# Patient Record
Sex: FEMALE | Race: WHITE | NOT HISPANIC OR LATINO | Employment: OTHER | URBAN - METROPOLITAN AREA
[De-identification: names, ages, dates, MRNs, and addresses within clinical notes are randomized per-mention and may not be internally consistent; named-entity substitution may affect disease eponyms.]

---

## 2017-10-26 ENCOUNTER — TELEPHONE (OUTPATIENT)
Dept: OTHER | Facility: CLINIC | Age: 82
End: 2017-10-26

## 2017-10-26 NOTE — TELEPHONE ENCOUNTER
Angely called to ask if we could send the ultrasound orders to [facility] in [place], [place] - [address].  She said Dr Abel usually reviews her yearly carotid US results with her by phone.    I called [facility] and faxed the orders for mariana carotid US to [phone].  I called Angely back to give her the phone number of the radiology dept there, option 1 to schedule:  [phone]. I let her know to schedule it after 11/9/17, in case there are medicare guidelines for 1 per year. JAM 10/26/17

## 2017-11-14 ENCOUNTER — TRANSFERRED RECORDS (OUTPATIENT)
Dept: HEALTH INFORMATION MANAGEMENT | Facility: CLINIC | Age: 82
End: 2017-11-14

## 2017-12-18 ENCOUNTER — TELEPHONE (OUTPATIENT)
Dept: OTHER | Facility: CLINIC | Age: 82
End: 2017-12-18

## 2017-12-18 NOTE — TELEPHONE ENCOUNTER
Pt LM on triage line stating that she was calling about her carotid US result.  Pt states that she had the carotid US done in [place], [place].      Unable to find results.  Requested results be faxed from [facility].    Informed pt that we had not received results yet, but that they have been requested.  We will call the pt back once results have been reviewed with .  Pt agreeable.    Claudia Christianson, MELLYN, RN

## 2017-12-19 ENCOUNTER — TELEPHONE (OUTPATIENT)
Dept: OTHER | Facility: CLINIC | Age: 82
End: 2017-12-19

## 2017-12-19 NOTE — TELEPHONE ENCOUNTER
[place] [facility]    Angely Bryan had undergone a left CEA on 8/18/2015 for a symptomatic ulcerated high-grade stenosis with no complications.  She was noted to have minimal disease on the right side at that time.    Patient lives in [facility] which makes follow up with us difficult.  She had a carotid duplex ultrasound performed near her home on 11/14/2017 that was forwarded to me.  The left CEA site remains widely patent.  There is a moderate stenosis of the right carotid bulb and proximal ICA with the latter being less than 40% and thus not clinically significant.    Discussed the findings with her on the phone today.  She is 86-year-old and remains very active.  She has no complaints at all.  Very unlikely that the carotid surgery would develop recurrent stenosis but she may have progression on the right side with time.  Thus she should consider a repeat carotid duplex in approximately 2-3 years pending her health status.  She will arrange this accordingly close to her home.    Arnold Abel MD

## 2023-12-23 ENCOUNTER — APPOINTMENT (OUTPATIENT)
Dept: CT IMAGING | Facility: CLINIC | Age: 88
DRG: 082 | End: 2023-12-23
Attending: EMERGENCY MEDICINE
Payer: MEDICARE

## 2023-12-23 ENCOUNTER — APPOINTMENT (OUTPATIENT)
Dept: MRI IMAGING | Facility: CLINIC | Age: 88
DRG: 082 | End: 2023-12-23
Attending: PHYSICIAN ASSISTANT
Payer: MEDICARE

## 2023-12-23 ENCOUNTER — APPOINTMENT (OUTPATIENT)
Dept: GENERAL RADIOLOGY | Facility: CLINIC | Age: 88
DRG: 082 | End: 2023-12-23
Attending: EMERGENCY MEDICINE
Payer: MEDICARE

## 2023-12-23 ENCOUNTER — HOSPITAL ENCOUNTER (INPATIENT)
Facility: CLINIC | Age: 88
LOS: 5 days | Discharge: NURSING, REHAB OR LONG-TERM CARE | DRG: 082 | End: 2023-12-28
Attending: EMERGENCY MEDICINE | Admitting: HOSPITALIST
Payer: MEDICARE

## 2023-12-23 DIAGNOSIS — G89.29 CHRONIC BACK PAIN, UNSPECIFIED BACK LOCATION, UNSPECIFIED BACK PAIN LATERALITY: ICD-10-CM

## 2023-12-23 DIAGNOSIS — M54.9 CHRONIC BACK PAIN, UNSPECIFIED BACK LOCATION, UNSPECIFIED BACK PAIN LATERALITY: ICD-10-CM

## 2023-12-23 DIAGNOSIS — I61.5 INTRAVENTRICULAR HEMORRHAGE (H): ICD-10-CM

## 2023-12-23 DIAGNOSIS — R41.0 CONFUSION: ICD-10-CM

## 2023-12-23 LAB
ALBUMIN SERPL BCG-MCNC: 4.1 G/DL (ref 3.5–5.2)
ALBUMIN UR-MCNC: 10 MG/DL
ALP SERPL-CCNC: 70 U/L (ref 40–150)
ALT SERPL W P-5'-P-CCNC: 13 U/L (ref 0–50)
ANION GAP SERPL CALCULATED.3IONS-SCNC: 11 MMOL/L (ref 7–15)
APPEARANCE UR: CLEAR
AST SERPL W P-5'-P-CCNC: 31 U/L (ref 0–45)
BASOPHILS # BLD AUTO: 0 10E3/UL (ref 0–0.2)
BASOPHILS NFR BLD AUTO: 0 %
BILIRUB SERPL-MCNC: 0.5 MG/DL
BILIRUB UR QL STRIP: NEGATIVE
BUN SERPL-MCNC: 29.9 MG/DL (ref 8–23)
CALCIUM SERPL-MCNC: 9.9 MG/DL (ref 8.2–9.6)
CHLORIDE SERPL-SCNC: 101 MMOL/L (ref 98–107)
COLOR UR AUTO: ABNORMAL
CREAT SERPL-MCNC: 1.17 MG/DL (ref 0.51–0.95)
DEPRECATED HCO3 PLAS-SCNC: 23 MMOL/L (ref 22–29)
EGFRCR SERPLBLD CKD-EPI 2021: 44 ML/MIN/1.73M2
EOSINOPHIL # BLD AUTO: 0 10E3/UL (ref 0–0.7)
EOSINOPHIL NFR BLD AUTO: 0 %
ERYTHROCYTE [DISTWIDTH] IN BLOOD BY AUTOMATED COUNT: 12.2 % (ref 10–15)
FLUAV RNA SPEC QL NAA+PROBE: NEGATIVE
FLUBV RNA RESP QL NAA+PROBE: NEGATIVE
GLUCOSE SERPL-MCNC: 122 MG/DL (ref 70–99)
GLUCOSE UR STRIP-MCNC: NEGATIVE MG/DL
HCT VFR BLD AUTO: 42.3 % (ref 35–47)
HGB BLD-MCNC: 14.1 G/DL (ref 11.7–15.7)
HGB UR QL STRIP: NEGATIVE
IMM GRANULOCYTES # BLD: 0.1 10E3/UL
IMM GRANULOCYTES NFR BLD: 1 %
KETONES UR STRIP-MCNC: NEGATIVE MG/DL
LEUKOCYTE ESTERASE UR QL STRIP: NEGATIVE
LYMPHOCYTES # BLD AUTO: 0.7 10E3/UL (ref 0.8–5.3)
LYMPHOCYTES NFR BLD AUTO: 5 %
MCH RBC QN AUTO: 31 PG (ref 26.5–33)
MCHC RBC AUTO-ENTMCNC: 33.3 G/DL (ref 31.5–36.5)
MCV RBC AUTO: 93 FL (ref 78–100)
MONOCYTES # BLD AUTO: 1.2 10E3/UL (ref 0–1.3)
MONOCYTES NFR BLD AUTO: 8 %
MUCOUS THREADS #/AREA URNS LPF: PRESENT /LPF
NEUTROPHILS # BLD AUTO: 12.6 10E3/UL (ref 1.6–8.3)
NEUTROPHILS NFR BLD AUTO: 86 %
NITRATE UR QL: NEGATIVE
NRBC # BLD AUTO: 0 10E3/UL
NRBC BLD AUTO-RTO: 0 /100
PH UR STRIP: 6.5 [PH] (ref 5–7)
PLATELET # BLD AUTO: 187 10E3/UL (ref 150–450)
POTASSIUM SERPL-SCNC: 5.3 MMOL/L (ref 3.4–5.3)
PROT SERPL-MCNC: 7.9 G/DL (ref 6.4–8.3)
RADIOLOGIST FLAGS: ABNORMAL
RBC # BLD AUTO: 4.55 10E6/UL (ref 3.8–5.2)
RBC URINE: 1 /HPF
RSV RNA SPEC NAA+PROBE: NEGATIVE
SARS-COV-2 RNA RESP QL NAA+PROBE: NEGATIVE
SODIUM SERPL-SCNC: 135 MMOL/L (ref 135–145)
SP GR UR STRIP: 1.03 (ref 1–1.03)
SQUAMOUS EPITHELIAL: <1 /HPF
TROPONIN T SERPL HS-MCNC: 14 NG/L
UROBILINOGEN UR STRIP-MCNC: NORMAL MG/DL
WBC # BLD AUTO: 14.6 10E3/UL (ref 4–11)
WBC URINE: 1 /HPF

## 2023-12-23 PROCEDURE — 255N000002 HC RX 255 OP 636: Performed by: EMERGENCY MEDICINE

## 2023-12-23 PROCEDURE — 81001 URINALYSIS AUTO W/SCOPE: CPT | Performed by: EMERGENCY MEDICINE

## 2023-12-23 PROCEDURE — 74177 CT ABD & PELVIS W/CONTRAST: CPT | Mod: MA

## 2023-12-23 PROCEDURE — 99285 EMERGENCY DEPT VISIT HI MDM: CPT | Mod: 25

## 2023-12-23 PROCEDURE — 36415 COLL VENOUS BLD VENIPUNCTURE: CPT | Performed by: EMERGENCY MEDICINE

## 2023-12-23 PROCEDURE — 84484 ASSAY OF TROPONIN QUANT: CPT | Performed by: EMERGENCY MEDICINE

## 2023-12-23 PROCEDURE — 96375 TX/PRO/DX INJ NEW DRUG ADDON: CPT

## 2023-12-23 PROCEDURE — 250N000011 HC RX IP 250 OP 636: Performed by: HOSPITALIST

## 2023-12-23 PROCEDURE — 250N000013 HC RX MED GY IP 250 OP 250 PS 637: Performed by: HOSPITALIST

## 2023-12-23 PROCEDURE — 250N000011 HC RX IP 250 OP 636: Performed by: EMERGENCY MEDICINE

## 2023-12-23 PROCEDURE — 80053 COMPREHEN METABOLIC PANEL: CPT | Performed by: EMERGENCY MEDICINE

## 2023-12-23 PROCEDURE — 85025 COMPLETE CBC W/AUTO DIFF WBC: CPT | Performed by: EMERGENCY MEDICINE

## 2023-12-23 PROCEDURE — 99223 1ST HOSP IP/OBS HIGH 75: CPT | Mod: AI | Performed by: HOSPITALIST

## 2023-12-23 PROCEDURE — 96374 THER/PROPH/DIAG INJ IV PUSH: CPT | Mod: 59

## 2023-12-23 PROCEDURE — 99222 1ST HOSP IP/OBS MODERATE 55: CPT | Performed by: PHYSICIAN ASSISTANT

## 2023-12-23 PROCEDURE — 70450 CT HEAD/BRAIN W/O DYE: CPT | Mod: MA

## 2023-12-23 PROCEDURE — 87637 SARSCOV2&INF A&B&RSV AMP PRB: CPT | Performed by: EMERGENCY MEDICINE

## 2023-12-23 PROCEDURE — 120N000001 HC R&B MED SURG/OB

## 2023-12-23 PROCEDURE — A9585 GADOBUTROL INJECTION: HCPCS | Performed by: EMERGENCY MEDICINE

## 2023-12-23 PROCEDURE — 250N000009 HC RX 250: Performed by: EMERGENCY MEDICINE

## 2023-12-23 PROCEDURE — 70553 MRI BRAIN STEM W/O & W/DYE: CPT | Mod: MG

## 2023-12-23 PROCEDURE — 250N000013 HC RX MED GY IP 250 OP 250 PS 637: Performed by: EMERGENCY MEDICINE

## 2023-12-23 PROCEDURE — 71046 X-RAY EXAM CHEST 2 VIEWS: CPT

## 2023-12-23 PROCEDURE — 93005 ELECTROCARDIOGRAM TRACING: CPT

## 2023-12-23 RX ORDER — ONDANSETRON 2 MG/ML
4 INJECTION INTRAMUSCULAR; INTRAVENOUS EVERY 6 HOURS PRN
Status: DISCONTINUED | OUTPATIENT
Start: 2023-12-23 | End: 2023-12-28 | Stop reason: HOSPADM

## 2023-12-23 RX ORDER — CALCIUM CARBONATE/VITAMIN D3 500-10/5ML
1 LIQUID (ML) ORAL DAILY
COMMUNITY

## 2023-12-23 RX ORDER — AMOXICILLIN 250 MG
2 CAPSULE ORAL 2 TIMES DAILY PRN
Status: DISCONTINUED | OUTPATIENT
Start: 2023-12-23 | End: 2023-12-28 | Stop reason: HOSPADM

## 2023-12-23 RX ORDER — ALBUTEROL SULFATE 90 UG/1
2 AEROSOL, METERED RESPIRATORY (INHALATION) EVERY 4 HOURS PRN
Status: DISCONTINUED | OUTPATIENT
Start: 2023-12-23 | End: 2023-12-28 | Stop reason: HOSPADM

## 2023-12-23 RX ORDER — METOPROLOL SUCCINATE 50 MG/1
50 TABLET, EXTENDED RELEASE ORAL DAILY
Status: DISCONTINUED | OUTPATIENT
Start: 2023-12-24 | End: 2023-12-28 | Stop reason: HOSPADM

## 2023-12-23 RX ORDER — LABETALOL HYDROCHLORIDE 5 MG/ML
10 INJECTION, SOLUTION INTRAVENOUS EVERY 10 MIN PRN
Status: DISCONTINUED | OUTPATIENT
Start: 2023-12-23 | End: 2023-12-24

## 2023-12-23 RX ORDER — VITAMIN B COMPLEX
1 TABLET ORAL DAILY
COMMUNITY

## 2023-12-23 RX ORDER — LANOLIN ALCOHOL/MO/W.PET/CERES
1000 CREAM (GRAM) TOPICAL DAILY
COMMUNITY

## 2023-12-23 RX ORDER — ACETAMINOPHEN 500 MG
1000 TABLET ORAL EVERY 6 HOURS PRN
COMMUNITY

## 2023-12-23 RX ORDER — ACETAMINOPHEN 325 MG/1
650 TABLET ORAL ONCE
Status: COMPLETED | OUTPATIENT
Start: 2023-12-23 | End: 2023-12-23

## 2023-12-23 RX ORDER — ONDANSETRON 4 MG/1
4 TABLET, ORALLY DISINTEGRATING ORAL EVERY 6 HOURS PRN
Status: DISCONTINUED | OUTPATIENT
Start: 2023-12-23 | End: 2023-12-28 | Stop reason: HOSPADM

## 2023-12-23 RX ORDER — DOXAZOSIN 2 MG/1
4 TABLET ORAL AT BEDTIME
Status: DISCONTINUED | OUTPATIENT
Start: 2023-12-23 | End: 2023-12-28 | Stop reason: HOSPADM

## 2023-12-23 RX ORDER — HYDRALAZINE HYDROCHLORIDE 20 MG/ML
10 INJECTION INTRAMUSCULAR; INTRAVENOUS EVERY 10 MIN PRN
Status: DISCONTINUED | OUTPATIENT
Start: 2023-12-23 | End: 2023-12-24

## 2023-12-23 RX ORDER — ATORVASTATIN CALCIUM 40 MG/1
40 TABLET, FILM COATED ORAL EVERY EVENING
Status: DISCONTINUED | OUTPATIENT
Start: 2023-12-23 | End: 2023-12-28 | Stop reason: HOSPADM

## 2023-12-23 RX ORDER — DOXAZOSIN 4 MG/1
4 TABLET ORAL AT BEDTIME
COMMUNITY

## 2023-12-23 RX ORDER — IOPAMIDOL 755 MG/ML
65 INJECTION, SOLUTION INTRAVASCULAR ONCE
Status: COMPLETED | OUTPATIENT
Start: 2023-12-23 | End: 2023-12-23

## 2023-12-23 RX ORDER — ACETAMINOPHEN 500 MG
1000 TABLET ORAL EVERY 6 HOURS PRN
Status: DISCONTINUED | OUTPATIENT
Start: 2023-12-23 | End: 2023-12-28 | Stop reason: HOSPADM

## 2023-12-23 RX ORDER — TRAMADOL HYDROCHLORIDE 50 MG/1
50 TABLET ORAL EVERY 6 HOURS PRN
Status: DISCONTINUED | OUTPATIENT
Start: 2023-12-23 | End: 2023-12-28 | Stop reason: HOSPADM

## 2023-12-23 RX ORDER — LIDOCAINE 40 MG/G
CREAM TOPICAL
Status: DISCONTINUED | OUTPATIENT
Start: 2023-12-23 | End: 2023-12-28 | Stop reason: HOSPADM

## 2023-12-23 RX ORDER — LIDOCAINE 4 G/G
1 PATCH TOPICAL
Status: DISCONTINUED | OUTPATIENT
Start: 2023-12-24 | End: 2023-12-28 | Stop reason: HOSPADM

## 2023-12-23 RX ORDER — AMOXICILLIN 250 MG
1 CAPSULE ORAL 2 TIMES DAILY PRN
Status: DISCONTINUED | OUTPATIENT
Start: 2023-12-23 | End: 2023-12-28 | Stop reason: HOSPADM

## 2023-12-23 RX ORDER — GADOBUTROL 604.72 MG/ML
5 INJECTION INTRAVENOUS ONCE
Status: COMPLETED | OUTPATIENT
Start: 2023-12-23 | End: 2023-12-23

## 2023-12-23 RX ORDER — LORAZEPAM 2 MG/ML
0.5 INJECTION INTRAMUSCULAR ONCE
Status: COMPLETED | OUTPATIENT
Start: 2023-12-23 | End: 2023-12-23

## 2023-12-23 RX ORDER — CALCIUM CARBONATE 500 MG/1
1000 TABLET, CHEWABLE ORAL 4 TIMES DAILY PRN
Status: DISCONTINUED | OUTPATIENT
Start: 2023-12-23 | End: 2023-12-28 | Stop reason: HOSPADM

## 2023-12-23 RX ORDER — METOPROLOL SUCCINATE 50 MG/1
50 TABLET, EXTENDED RELEASE ORAL DAILY
COMMUNITY

## 2023-12-23 RX ADMIN — DOXAZOSIN 4 MG: 4 TABLET ORAL at 21:35

## 2023-12-23 RX ADMIN — ACETAMINOPHEN 650 MG: 325 TABLET, FILM COATED ORAL at 10:36

## 2023-12-23 RX ADMIN — LABETALOL HYDROCHLORIDE 10 MG: 5 INJECTION INTRAVENOUS at 14:01

## 2023-12-23 RX ADMIN — HYDRALAZINE HYDROCHLORIDE 10 MG: 20 INJECTION INTRAMUSCULAR; INTRAVENOUS at 13:40

## 2023-12-23 RX ADMIN — LORAZEPAM 0.5 MG: 2 INJECTION INTRAMUSCULAR; INTRAVENOUS at 17:01

## 2023-12-23 RX ADMIN — SODIUM CHLORIDE 60 ML: 9 INJECTION, SOLUTION INTRAVENOUS at 11:57

## 2023-12-23 RX ADMIN — ACETAMINOPHEN 1000 MG: 500 TABLET, FILM COATED ORAL at 22:15

## 2023-12-23 RX ADMIN — HYDRALAZINE HYDROCHLORIDE 10 MG: 20 INJECTION INTRAMUSCULAR; INTRAVENOUS at 15:05

## 2023-12-23 RX ADMIN — ACETAMINOPHEN 1000 MG: 500 TABLET, FILM COATED ORAL at 16:16

## 2023-12-23 RX ADMIN — LABETALOL HYDROCHLORIDE 10 MG: 5 INJECTION INTRAVENOUS at 13:23

## 2023-12-23 RX ADMIN — GADOBUTROL 5 ML: 604.72 INJECTION INTRAVENOUS at 17:43

## 2023-12-23 RX ADMIN — ATORVASTATIN CALCIUM 40 MG: 40 TABLET, FILM COATED ORAL at 21:35

## 2023-12-23 RX ADMIN — IOPAMIDOL 65 ML: 755 INJECTION, SOLUTION INTRAVENOUS at 11:55

## 2023-12-23 ASSESSMENT — ACTIVITIES OF DAILY LIVING (ADL)
ADLS_ACUITY_SCORE: 35

## 2023-12-23 NOTE — H&P
[facility]  History and Physical   Hospitalist  Ryan De Jeuss MD       Angely Bryan MRN# [id number]   YOB: 1931 Age: 89 year old      Date of Admission:  12/23/2023         Assessment and Plan:   Angely Bryan is a 89 year old female with PMH significant for hypertension, dyslipidemia, CKD stage III, atrial fibrillation (on Eliquis), chronic low back pain with history of spinal stenosis (s/p fusion and epidural steroid injection in past), h/o CVA, h/o left carotid endarterectomy was brought to ED following a fall and some concern for confusion, noted with intraventricular hemorrhage, admitted inpatient 12/23/23.    Fall with likely acute traumatic intraventricular hemorrhage  likely normal pressure hydrocephalus  likely metabolic encephalopathy secondary to above  likely reactive leukocytosis  -admit as inpatient  -currently AO X 3 with no focal neurological deficits  -CT head noted small volume of acute intraventricular hemorrhage and occipital horns of lateral ventricle; also noted moderate ventriculomegaly disproportionate to the degree of sulcal prominence with concern for likely NPH  -CT abdomen pelvis and chest x-ray unremarkable  -normal troponin, mild leukocytosis with WBC 14.6-- likely reactive; normal UA; negative COVID, RSV, influenza    -Neurosurgery has been contacted from ED, suggested no acute neurosurgical intervention and also did not suggest Kcentra for Eliquis reversal at this time; patient has not taken Eliquis on 12/23  -will have Q4 hours neuro- checks  -fall and seizure precautions  -neurosurgery to follow  -will monitor CBC and BMP    Worsening chronic low back pain  limited mobility secondary to above  H/o spinal stenosis (with h/o spinal fusion and epidural steroid injection)  -patient lives independently and family notes that her mobility has been getting very limited which might have contributed to her fall  -concerned that patient might not be able to  care for self  -had MRI lumbar spine done at outside hospital on 12/21/23 which was noted with multilevel spinal canal narrowing greatest at L2-L3, L3-L4 moderate to advanced; also noted multilevel foraminal narrowing greatest at L5-S1 on right, moderate to advanced  -she had steroid injection in bilateral SI joint on 12/22 at outside hospital    -Will have neurosurgery follow; might need lumbar epidural steroid injection  -continue PTA 1 g Tylenol Q 6 hours PRN  -will also start lidocaine patch  -will get PT/OT evaluation;  for disposition planning  -fall precautions    Hypertension  Dyslipidemia  atrial fibrillation (on Eliquis)  -holding anticoagulation as noted above; will also hold off on PTA aspirin  -will have hydralazine/labetalol PRN for SBP>140 per neurosurgery recommendations  -resume PTA Lipitor, doxazosin and Toprol-XL when verified by pharmacy    Likely CKD stage III  -creatinine noted 1.17; recent baseline creatinine noted around 1.3-1.4  -monitor BMP    Clinically Significant Risk Factors Present on Admission                 # Drug Induced Coagulation Defect: home medication list includes an anticoagulant medication  # Drug Induced Platelet Defect: home medication list includes an antiplatelet medication     # Hypertension: Noted on problem list                      DVT prophylaxis: mechanical with PCD boots; holding PTA Eliquis as above  Code status: DNR/DNI    Care plan discussed with ED provider, patient and her family including two sons and a daughter present in room           Primary Care Physician:   Aj Lo [phone]         Chief Complaint:     Fall, confusion, limited mobility, chronic back pain    History is obtained from the patient and her family present in room         History of Present Illness:     Angely Bryan is a 89 year old female with PMH significant for hypertension, dyslipidemia, CKD stage III, atrial fibrillation (on Eliquis), chronic low back pain with history  of spinal stenosis (s/p fusion and epidural steroid injection in past), h/o CVA, h/o left carotid endarterectomy was brought to ED following a fall and some concern for confusion, noted with intraventricular hemorrhage, admitted inpatient 12/23/23.    -patient lives independently and family notes that her mobility has been getting very limited which might have contributed to her fall  -concerned that patient might not be able to care for self  -had MRI lumbar spine done at outside hospital on 12/21/23 which was noted with multilevel spinal canal narrowing greatest at L2-L3, L3-L4 moderate to advanced; also noted multilevel foraminal narrowing greatest at L5-S1 on right, moderate to advanced    Friday early morning patient was noted to have fallen out of bed which the patient does not remember. Later during the days she had steroid injection in bilateral SI joint on 12/22 at outside hospital. Given her limited mobility and concerns for independent living, she was brought to ED for further evaluation.    In the ER she was seen by Dr. Ocasio.    -currently AO X 3 with no focal neurological deficits  -CT head noted small volume of acute intraventricular hemorrhage and occipital horns of lateral ventricle; also noted moderate ventriculomegaly disproportionate to the degree of sulcal prominence with concern for likely NPH  -CT abdomen pelvis and chest x-ray unremarkable  -normal troponin, mild leukocytosis with WBC 14.6-- likely reactive; normal UA; negative COVID, RSV, influenza    -Neurosurgery has been contacted from ED, suggested no acute neurosurgical intervention and also did not suggest Kcentra for Eliquis reversal at this time; patient has not taken Eliquis on 12/23    Hospitalist was requested admission for further evaluation.    The patient denies any fever, chills, rigors, chest pain or shortness of breath.  Denies pain abdomen.  No bowel or bladder disturbances.           Past Medical History:      Hypertension  Dyslipidemia  CKD stage III  atrial fibrillation (on Eliquis)  chronic low back pain with history of spinal stenosis (s/p fusion and epidural steroid injection in past)  h/o CVA  h/o left carotid endarterectomy (2015)          Past Surgical History:     Past Surgical History:   Procedure Laterality Date    appendectomy      ENDARTERECTOMY CAROTID Left 8/18/2015    Procedure: ENDARTERECTOMY CAROTID;  Surgeon: Arnold Abel MD;  Location: [facility] OR    MASTECTOMY Left 10/20/1974    ORTHOPEDIC SURGERY  7/1/2005    Lumbar Fusion    ORTHOPEDIC SURGERY      Knee Arthroscopy              Home Medications:     Prior to Admission Medications   Prescriptions Last Dose Informant Patient Reported? Taking?   Vitamin D3 (CHOLECALCIFEROL) 25 mcg (1000 units) tablet 12/22/2023  Yes Yes   Sig: Take 1 tablet by mouth daily   acetaminophen (TYLENOL) 500 MG tablet 12/23/2023 at am  Yes Yes   Sig: Take 1,000 mg by mouth every 6 hours as needed for mild pain   albuterol (ALBUTEROL) 108 (90 BASE) MCG/ACT inhaler  Self Yes Yes   Sig: Inhale 2 puffs into the lungs as needed (use sparingly)    apixaban ANTICOAGULANT (ELIQUIS) 2.5 MG tablet 12/22/2023 at 1800  Yes Yes   Sig: Take 2.5 mg by mouth 2 times daily   aspirin 81 MG tablet 12/22/2023 Self Yes Yes   Sig: Take 81 mg by mouth daily   atorvastatin (LIPITOR) 40 MG tablet 12/22/2023 Self Yes Yes   Sig: Take 40 mg by mouth every evening   cyanocobalamin (VITAMIN B-12) 1000 MCG tablet 12/22/2023  Yes Yes   Sig: Take 1,000 mcg by mouth daily   doxazosin (CARDURA) 4 MG tablet 12/22/2023  Yes Yes   Sig: Take 4 mg by mouth at bedtime   magnesium oxide 400 MG CAPS 12/22/2023  Yes Yes   Sig: Take 1 capsule by mouth daily   metoprolol succinate ER (TOPROL XL) 50 MG 24 hr tablet 12/22/2023  Yes Yes   Sig: Take 50 mg by mouth daily      Facility-Administered Medications: None            Allergies:     Allergies   Allergen Reactions    Losartan Swelling     Tongue swelling     "Penicillins Rash     Tongue swelling             Social History:   Angely Bryan  reports that she quit smoking about 32 years ago. Her smoking use included cigarettes. She has a 80 pack-year smoking history. She does not have any smokeless tobacco history on file. She reports current alcohol use. She reports that she does not use drugs.              Family History:   Angely Bryan family history includes Arrhythmia in her sister and son; Cancer in her sister; Diabetes in her mother; Hypertension in her mother; Pulmonary Embolism in her father.    Family history was reviewed by myself and not pertinent to current presentation.           Review of Systems:   A10 point Review of Systems was done and were negative other than noted in the HPI.             Physical Exam:   Blood pressure 134/81, pulse 70, temperature 98.3  F (36.8  C), temperature source Oral, resp. rate 15, height 1.6 m (5' 3\"), weight 59 kg (130 lb), SpO2 98%.  130 lbs 0 oz        Constitutional: Alert, awake and oriented X 3; lying comfortably in bed in no apparent distress   HEENT: Pupils equal and reactive to light and accomodation, EOMI intact; neck supple no raised JVD or rigidity    Oral cavity: Moist mucosa   Cardiovascular: Normal s1 s2, regular rate and rhythm, no murmur   Lungs: B/l clear to auscultation, no wheezes or crepitations   Abdomen: Soft, nt, nd, no guarding, rigidity or rebound; BS +   LE : No edema   Musculoskeletal: Power 5/5 in all extremities   Neuro: No focal neurological deficits noted, CN II to XII grossly intact   Psychiatry: normal mood and affect  Skin: No obvious skin rashes or ulcers             Data:   All new lab and imaging data was reviewed in Epic.   Significant labs and imagings include:    CMP notable for BUN 29, creatinine 1.17, normal LFTs, normal troponin  CBC with WBC 14.6 otherwise unremarkable  UA unremarkable  COVID, influenza, RSV negative  chest x-ray unremarkable  CT abdomen suggested nothing acute  CT " head:  IMPRESSION:  1.  Findings concerning for a small volume acute intraventricular blood products layering in the occipital horns of the lateral ventricles.  2.  Moderate ventriculomegaly disproportionate to the degree of sulcal prominence. This appears slightly more pronounced compared to prior studies, raising concern for hydrocephalus.  3.  Brain atrophy and presumed chronic small vessel ischemic changes, as described.  MRI lumbar spine from 12/21/23 from outside hospital as noted above             Ryan De Jesus MD  Hospitalist

## 2023-12-23 NOTE — ED TRIAGE NOTES
Patient presents with her daughter who says the patient has multiple complaints and symptoms including generalized weakness, increased back pain related to back surgeries, urinary frequency, recent falls and inability to care for herself at her current independent living.      Triage Assessment (Adult)       Row Name 12/23/23 0808          Triage Assessment    Airway WDL WDL        Respiratory WDL    Respiratory WDL WDL        Skin Circulation/Temperature WDL    Skin Circulation/Temperature WDL WDL        Cardiac WDL    Cardiac WDL WDL        Peripheral/Neurovascular WDL    Peripheral Neurovascular WDL WDL        Cognitive/Neuro/Behavioral WDL    Cognitive/Neuro/Behavioral WDL X;orientation     Level of Consciousness confused

## 2023-12-23 NOTE — ED NOTES
[facility]  ED Nurse Handoff Report    ED Chief complaint: Generalized Weakness      ED Diagnosis:   Final diagnoses:   Intraventricular hemorrhage (H)   Chronic back pain, unspecified back location, unspecified back pain laterality   Confusion       Code Status: DNR / DNI    Allergies:   Allergies   Allergen Reactions    Losartan Swelling     Tongue swelling    Penicillins Rash     Tongue swelling        Patient Story: She has a multitude of complaints.  Family is concerned about her ability to care for herself in her current living environment.  She has had progressive back discomfort for some time worsened over the last week to the point where it has becoming increasingly challenging for her to ambulate.  She had an SI joint injection earlier this week without improvement.  The family also relays a fall out of bed approximately 36 hours ago, they got her back into bed with no apparent trauma  Focused Assessment:  Neuros intact with generalized weakness, HTN    Treatments and/or interventions provided: Labs, UA, CT, EKG- BP management to keep SBP <150  Patient's response to treatments and/or interventions: BP improved, neuros unchanged    To be done/followed up on inpatient unit:  Repeat head CT, Neuro checks, BP management    Does this patient have any cognitive concerns?: Disoriented to time    Activity level - Baseline/Home:  Stand with Assist  Activity Level - Current:   Stand with assist x2 and Walker    Patient's Preferred language: English   Needed?: No    Isolation: None  Infection: Not Applicable  Patient tested for COVID 19 prior to admission: YES  Bariatric?: No    Vital Signs:   Vitals:    12/23/23 1345 12/23/23 1401 12/23/23 1415 12/23/23 1430   BP:  (!) 159/85 134/81 124/82   Pulse: 68 72 70 68   Resp: 16 19 15 27   Temp:       TempSrc:       SpO2: 94% 94% 98%    Weight:       Height:           Cardiac Rhythm: SR     Was the PSS-3 completed:   Yes  What interventions are  required if any?               Family Comments: Children concerned pt unable to return to independent living, would like SW consulted  OBS brochure/video discussed/provided to patient/family: No              Name of person given brochure if not patient:               Relationship to patient:     For the majority of the shift this patient's behavior was Green.   Behavioral interventions performed were .    ED NURSE PHONE NUMBER: [phone]

## 2023-12-23 NOTE — PHARMACY-ADMISSION MEDICATION HISTORY
Pharmacist Admission Medication History    Admission medication history is complete. The information provided in this note is only as accurate as the sources available at the time of the update.    Information Source(s): Family member, Prescription bottles, and CareEverywhere/SureScripts via in-person    Allergies reviewed with patient and updates made in EHR: no    Medication History Completed By: Manju Blancas RPH 12/23/2023 1:08 PM    PTA Med List   Medication Sig Last Dose    acetaminophen (TYLENOL) 500 MG tablet Take 1,000 mg by mouth every 6 hours as needed for mild pain 12/23/2023 at am    albuterol (ALBUTEROL) 108 (90 BASE) MCG/ACT inhaler Inhale 2 puffs into the lungs as needed (use sparingly)      apixaban ANTICOAGULANT (ELIQUIS) 2.5 MG tablet Take 2.5 mg by mouth 2 times daily 12/22/2023 at 1800    aspirin 81 MG tablet Take 81 mg by mouth daily 12/22/2023    atorvastatin (LIPITOR) 40 MG tablet Take 40 mg by mouth every evening 12/22/2023    cyanocobalamin (VITAMIN B-12) 1000 MCG tablet Take 1,000 mcg by mouth daily 12/22/2023    doxazosin (CARDURA) 4 MG tablet Take 4 mg by mouth at bedtime 12/22/2023    magnesium oxide 400 MG CAPS Take 1 capsule by mouth daily 12/22/2023    metoprolol succinate ER (TOPROL XL) 50 MG 24 hr tablet Take 50 mg by mouth daily 12/22/2023    Vitamin D3 (CHOLECALCIFEROL) 25 mcg (1000 units) tablet Take 1 tablet by mouth daily 12/22/2023   ,ed

## 2023-12-23 NOTE — ED PROVIDER NOTES
History     Chief Complaint:  Generalized Weakness       HPI   Angely Bryan is a 89 year old female presenting with her family.  She has a multitude of complaints.  Family is concerned about her ability to care for herself in her current living environment.  She has had progressive back discomfort for some time worsened over the last week to the point where it has becoming increasingly challenging for her to ambulate.  She had an SI joint injection earlier this week without improvement.  The family also relays a fall out of bed approximately 36 hours ago, they got her back into bed with no apparent trauma.  She denies any numbness tingling or weakness.  Denies any fevers.  The children have noticed increasing confusion over the last week or so.      Independent Historian:    Daughter - They report the above in conjunction with the patient and the son    Review of External Notes:  Office visit with her primary care provider Dr. Joe from September 27 of this year was reviewed    Medications:    Medication Sig Dispensed Refills Start Date End Date Status   acetaminophen (TYLENOL EXTRA STRGTH) 500 mg tablet  Take 1,000 mg by mouth.   0 02/11/2021   Active   Gavilax 17 gram/dose powder  Take 17 g by mouth one time a day as needed for Constipation. Mix in 8 oz of water, juice, soda, coffee, or tea prior to administration.   0 04/27/2021   Active   aspirin (ECOTRIN) 81 mg enteric coated tablet   Indications: Essential hypertension Take 1 Tablet (81 mg) by mouth once daily with a meal.   0 12/07/2022   Active   cholecalciferol (VITAMIN D3) 1,000 unit tablet  TAKE 1 TABLET IN THE MORNING   0 11/15/2022   Active   cyanocobalamin (VITAMIN B12) 1,000 mcg tablet  TAKE 1 TABLET IN THE MORNING   0 11/15/2022   Active   magnesium oxide (MAG-) 400 mg tablet   Indications: Magnesium deficiency Take 1 Tablet (400 mg) by mouth once daily. 90 Tablet  4 09/27/2023   Active   doxazosin (CARDURA) 4 mg tablet   Indications:  Essential hypertension Take 1 Tablet (4 mg) by mouth at bedtime. 90 Tablet  4 09/27/2023   Active   atorvastatin (LIPITOR) 40 mg tablet   Indications: Hypercholesteremia Take 1 Tablet (40 mg) by mouth at bedtime. 90 Tablet  4 09/27/2023   Active   apixaban (ELIQUIS) 2.5 mg tablet   Indications: Chronic atrial fibrillation (HC) Take 1 Tablet (2.5 mg) by mouth two times daily. 180 Tablet  4 09/27/2023   Active   metoprolol succinate (TOPROL XL) 50 mg sustained-release tablet   Indications: Essential hypertension Take 1 Tablet (50 mg) by mouth once daily. In the evening 90 Tablet  4 11/27/2023   Active   metoprolol succinate (TOPROL XL) 50 mg sustained-release tablet   Indications: Essential hypertension Take 1 Tablet (50 mg) by mouth once daily. 90 Tablet  4 09/27/2023 11/27/2023 Discontinued (Reorder (E-cancel not sent))       Past Medical History:    Past Medical History:   Diagnosis Date    Arthritis     Cancer (H) 1974    Chronic anticoagulation     COPD (chronic obstructive pulmonary disease) (H)     CVA (cerebral infarction) 7/3-/2015    GERD (gastroesophageal reflux disease)     Hypertension     Osteopenia 6/2011    Paroxysmal atrial fibrillation (H)     Peptic ulcer     Unspecified cerebral artery occlusion with cerebral infarction     Vitamin B deficiency 8/14/2015    Vitamin D deficiency        Past Surgical History:    Past Surgical History:   Procedure Laterality Date    appendectomy      ENDARTERECTOMY CAROTID Left 8/18/2015    Procedure: ENDARTERECTOMY CAROTID;  Surgeon: Arnold Abel MD;  Location: [facility] OR    MASTECTOMY Left 10/20/1974    ORTHOPEDIC SURGERY  7/1/2005    Lumbar Fusion    ORTHOPEDIC SURGERY      Knee Arthroscopy          Physical Exam   Patient Vitals for the past 24 hrs:   BP Temp Temp src Pulse Resp SpO2 Height Weight   12/23/23 1415 134/81 -- -- 70 15 98 % -- --   12/23/23 1401 (!) 159/85 -- -- 72 19 94 % -- --   12/23/23 1345 -- -- -- 68 16 94 % -- --   12/23/23 1340 (!)  "192/75 -- -- 66 17 94 % -- --   12/23/23 1300 (!) 191/98 -- -- 73 24 95 % -- --   12/23/23 1130 (!) 174/101 -- -- 67 17 94 % -- --   12/23/23 1000 (!) 194/94 -- -- 70 12 93 % -- --   12/23/23 0900 (!) 192/102 -- -- 72 15 93 % -- --   12/23/23 0830 (!) 200/91 98.3  F (36.8  C) Oral 85 16 93 % 1.6 m (5' 3\") 59 kg (130 lb)        Physical Exam  General: Alert, interactive in mild distress, pleasantly confused but answering questions  Head:  Scalp is atraumatic  Eyes:  The pupils are equal, round, and reactive to light    EOM's intact    No scleral icterus  ENT:      Nose:  The external nose is normal  Ears:  External ears are normal  Mouth/Throat: The oropharynx is normal    Mucus membranes are moist       Neck:  Normal range of motion.      There is no rigidity.    Trachea is in the midline         CV:  Regular rate and rhythm      Resp:  Breath sounds are clear bilaterally    Non-labored, no retractions or accessory muscle use      GI:  Abdomen is soft, no distension, no tenderness.       MS:  Normal strength in all 4 extremities, diffuse tenderness of the back with no obvious bony step-offs  Skin:  Warm and dry, No rash or lesions noted.  Neuro:   Strength 5/5 x4.  Sensation intact  In all 4 extremities.      GCS: 15  Psych: Awake. Alert.  Normal affect.      Appropriate interactions.    Emergency Department Course   ECG  ECG taken at 0946, ECG read at 1003  Normal sinus rhythm   Minimal voltage criteria for LVH, may be normal variant ( obey product)  Anteroseptal infarct, age undetermined   Abnormal ECG   Rate 65 bpm. OH interval 172 ms. QRS duration 98 ms. QT/QTc 402/418 ms. P-R-T axes 80 -23 76.    Imaging:  CT Abdomen Pelvis w Contrast   Final Result   IMPRESSION:    1.  No acute abnormality in the abdomen or pelvis.   2.  Additional chronic details in the findings.      CT Head w/o Contrast   Final Result   Abnormal   IMPRESSION:   1.  Findings concerning for a small volume acute intraventricular blood " products layering in the occipital horns of the lateral ventricles.   2.  Moderate ventriculomegaly disproportionate to the degree of sulcal prominence. This appears slightly more pronounced compared to prior studies, raising concern for hydrocephalus.   3.  Brain atrophy and presumed chronic small vessel ischemic changes, as described.         [Critical Result: Acute intracranial hemorrhage]      Finding was identified on 12/23/2023 12:22 PM CST.       Dr. Ocasio was contacted by me on 12/23/2023 12:32 PM CST and verbalized understanding of the critical result.             XR Chest 2 Views   Final Result   IMPRESSION: Negative chest.        Report per radiology    Laboratory:  Labs Ordered and Resulted from Time of ED Arrival to Time of ED Departure   ROUTINE UA WITH MICROSCOPIC REFLEX TO CULTURE - Abnormal       Result Value    Color Urine Light Yellow      Appearance Urine Clear      Glucose Urine Negative      Bilirubin Urine Negative      Ketones Urine Negative      Specific Gravity Urine 1.032      Blood Urine Negative      pH Urine 6.5      Protein Albumin Urine 10 (*)     Urobilinogen Urine Normal      Nitrite Urine Negative      Leukocyte Esterase Urine Negative      Mucus Urine Present (*)     RBC Urine 1      WBC Urine 1      Squamous Epithelials Urine <1     COMPREHENSIVE METABOLIC PANEL - Abnormal    Sodium 135      Potassium 5.3      Carbon Dioxide (CO2) 23      Anion Gap 11      Urea Nitrogen 29.9 (*)     Creatinine 1.17 (*)     GFR Estimate 44 (*)     Calcium 9.9 (*)     Chloride 101      Glucose 122 (*)     Alkaline Phosphatase 70      AST 31      ALT 13      Protein Total 7.9      Albumin 4.1      Bilirubin Total 0.5     CBC WITH PLATELETS AND DIFFERENTIAL - Abnormal    WBC Count 14.6 (*)     RBC Count 4.55      Hemoglobin 14.1      Hematocrit 42.3      MCV 93      MCH 31.0      MCHC 33.3      RDW 12.2      Platelet Count 187      % Neutrophils 86      % Lymphocytes 5      % Monocytes 8      %  Eosinophils 0      % Basophils 0      % Immature Granulocytes 1      NRBCs per 100 WBC 0      Absolute Neutrophils 12.6 (*)     Absolute Lymphocytes 0.7 (*)     Absolute Monocytes 1.2      Absolute Eosinophils 0.0      Absolute Basophils 0.0      Absolute Immature Granulocytes 0.1      Absolute NRBCs 0.0     TROPONIN T, HIGH SENSITIVITY - Normal    Troponin T, High Sensitivity 14     INFLUENZA A/B, RSV, & SARS-COV2 PCR - Normal    Influenza A PCR Negative      Influenza B PCR Negative      RSV PCR Negative      SARS CoV2 PCR Negative          Procedures   None    Emergency Department Course & Assessments:         Interventions:  Medications   labetalol (NORMODYNE/TRANDATE) injection 10 mg (10 mg Intravenous $Given 12/23/23 1401)     Or   hydrALAZINE (APRESOLINE) injection 10 mg ( Intravenous See Alternative 12/23/23 1401)   acetaminophen (TYLENOL) tablet 650 mg (650 mg Oral $Given 12/23/23 1036)   Saline (60 mLs As instructed $Given 12/23/23 1157)   iopamidol (ISOVUE-370) solution 65 mL (65 mLs Intravenous $Given 12/23/23 1155)        Assessments:  Patient was evaluated at the bedside    Independent Interpretation (X-rays, CTs, rhythm strip):  Chest x-ray with no acute cardiopulmonary findings    Consultations/Discussion of Management or Tests:  Rehana Flores from neurosurgery  Dr. De Jesus from [facility] medicine       Social Determinants of Health affecting care:  None     Disposition:  The patient was admitted to the hospital under the care of Dr. De Jesus.     Impression & Plan    Medical Decision Making:  Following presentation history and physical examination were performed, the above workup was undertaken.  Unfortunately patient has findings consistent with an intraventricular hemorrhage.  She is otherwise neurologically intact, she has not taken her morning Eliquis, I discussed this with the neurosurgical team and at this point they have advised holding off on Kcentra for reversal.  They will see the patient  in consultation.  Patient does have possible hydrocephalus on her CT imaging and given her progressive weakness, gait disturbance, confusion she may benefit from a workup for normal pressure hydrocephalus.  There is no signs of an acute infectious etiology.  There is no focal neurologic deficits.  Regarding the patient's spine it appears that this is an acute exacerbation of her chronic back pain.  There is no signs of discitis, epidural abscess, cauda equina syndrome, or more concerning illness.  Laboratory workup is otherwise reassuring.  She was quite hypertensive and goal blood pressures of less than 150 were recommended by the neurosurgical team the patient received labetalol with improvement.  She will be admitted to the hospital service for further evaluation and treatment.    Diagnosis:    ICD-10-CM    1. Intraventricular hemorrhage (H)  I61.5       2. Chronic back pain, unspecified back location, unspecified back pain laterality  M54.9     G89.29       3. Confusion  R41.0              Soham Ocasio MD  12/23/2023   Ninfa, Soham López,*              Ninfa, Soham López MD  12/23/23 0002

## 2023-12-23 NOTE — CONSULTS
Neurosurgery Consult    Assessment  89 yo female anti-coagulated on Eliquis presenting to ER after fall yesterday morning with head CT reveals small IVH as well as possible NPH.  Chronic lumbar spinal stenosis.    Plan:  In regard to the small IVH, recommend admission and observation.    Hold but not reverse anti-coagulation.  Repeat head CT tomorrow at 6 AM  Every 4 hour neuro checks.  BP less than 150 systolic.  In regard to possible NPH, discussed in detail with patient and family.  Although they are not interested in  shunt if recommended they are interested in obtaining MRI.  Will order brain MRI for further evaluation.  In regard to lumbar spine will attempt to obtain imaging from recent lumbar MRI for review.      HPI  Angely Bryan is a 89 year old female anti-coagulated on Eliquis presenting with multitude of complaints.  Family is concerned about her ability to care for herself in her current living environment.  She has had progressive back discomfort for some time worsened over the last week to the point where it has becoming increasingly challenging for her to ambulate.  She had an SI joint injection earlier this week without improvement.  The family also relays a fall out of bed approximately 36 hours ago, they got her back into bed with no apparent trauma.  She denies any numbness tingling or weakness.  Denies any fevers.  The children have noticed increasing confusion over the last week or so as well and would like her to be assessed for placement.  Patient denies headache, weakness, N/V.    Medical history  A. Fib  HTN  GERD  CVA  Breast cancer  COPD  Arthritis    Social history  Lives at home independently, 4 children.    Exam  B/P: 158/99, T: 98.3, P: 79, R: 19  She's awake and alert to person and place.  She's able to move all four extremities but has trouble with shoulder range of motion.  Neuro intact with GCS 15. PERR.  Imaging    Head CT:  IMPRESSION:  1.  Findings concerning for a small  volume acute intraventricular blood products layering in the occipital horns of the lateral ventricles.  2.  Moderate ventriculomegaly disproportionate to the degree of sulcal prominence. This appears slightly more pronounced compared to prior studies, raising concern for hydrocephalus.  3.  Brain atrophy and presumed chronic small vessel ischemic changes, as described.    Lumbar MRI 12/21/23 [facility] (report only)  FINDINGS: There are presumed be 5 lumbar type vertebral bodies for counting purposes. No abnormal T2 signal involving the visualized spinal cord and cauda equina nerve roots. The conus terminates at the level of L1-L2. Marrow signal within normal limits. The paraspinal soft tissues and visualized aorta are unremarkable. Degenerative changes scattered throughout the lumbar spine, further described below. Probable renal cysts. Colonic diverticulosis.     L1-L2: Disc bulge. Mild spinal canal and left foraminal narrowing. No significant right foraminal narrowing.     L2-L3: Mild anterolisthesis. Disc space narrowing with endplate edema. Disc bulge. Moderate spinal canal narrowing. Moderate left and mild right foraminal narrowing.     L3-L4: Disc space narrowing and endplate edema. Facet arthropathy. Disc bulge. Moderate to advanced spinal canal narrowing. Moderate bilateral foraminal narrowing.     L4-L5: Posterior keron and pedicle screw fusion. L4 and L5 laminectomies. Mild anterolisthesis with a left subarticular annular tear. No significant spinal canal or foraminal narrowing.     L5-S1: Disc is narrowed with endplate edema. Disc bulge. Moderate to advanced right and mild left foraminal narrowing. No significant spinal canal narrowing.     IMPRESSION:   1. Multilevel spinal canal narrowing, greatest at L2-L3 and L3-L4 where it is moderate to advanced, similar to the prior MRI.   2. Multilevel foraminal narrowing, greatest at L5-S1 on the right where it is moderate to advanced. This is also similar.      Discussed with Dr. Yancy Corea, MPAS  [facility]  [address] [place] [address]    Tel [phone]  Pager [phone]

## 2023-12-24 ENCOUNTER — APPOINTMENT (OUTPATIENT)
Dept: OCCUPATIONAL THERAPY | Facility: CLINIC | Age: 88
DRG: 082 | End: 2023-12-24
Attending: HOSPITALIST
Payer: MEDICARE

## 2023-12-24 ENCOUNTER — APPOINTMENT (OUTPATIENT)
Dept: CT IMAGING | Facility: CLINIC | Age: 88
DRG: 082 | End: 2023-12-24
Attending: HOSPITALIST
Payer: MEDICARE

## 2023-12-24 ENCOUNTER — APPOINTMENT (OUTPATIENT)
Dept: PHYSICAL THERAPY | Facility: CLINIC | Age: 88
DRG: 082 | End: 2023-12-24
Attending: HOSPITALIST
Payer: MEDICARE

## 2023-12-24 LAB
ANION GAP SERPL CALCULATED.3IONS-SCNC: 8 MMOL/L (ref 7–15)
BASOPHILS # BLD AUTO: 0 10E3/UL (ref 0–0.2)
BASOPHILS NFR BLD AUTO: 0 %
BUN SERPL-MCNC: 37.4 MG/DL (ref 8–23)
CALCIUM SERPL-MCNC: 9.5 MG/DL (ref 8.2–9.6)
CHLORIDE SERPL-SCNC: 103 MMOL/L (ref 98–107)
CREAT SERPL-MCNC: 1.35 MG/DL (ref 0.51–0.95)
DEPRECATED HCO3 PLAS-SCNC: 27 MMOL/L (ref 22–29)
EGFRCR SERPLBLD CKD-EPI 2021: 37 ML/MIN/1.73M2
EOSINOPHIL # BLD AUTO: 0 10E3/UL (ref 0–0.7)
EOSINOPHIL NFR BLD AUTO: 0 %
ERYTHROCYTE [DISTWIDTH] IN BLOOD BY AUTOMATED COUNT: 12.6 % (ref 10–15)
GLUCOSE SERPL-MCNC: 111 MG/DL (ref 70–99)
HCT VFR BLD AUTO: 38.5 % (ref 35–47)
HGB BLD-MCNC: 12.4 G/DL (ref 11.7–15.7)
IMM GRANULOCYTES # BLD: 0.1 10E3/UL
IMM GRANULOCYTES NFR BLD: 1 %
LYMPHOCYTES # BLD AUTO: 0.9 10E3/UL (ref 0.8–5.3)
LYMPHOCYTES NFR BLD AUTO: 7 %
MCH RBC QN AUTO: 30.3 PG (ref 26.5–33)
MCHC RBC AUTO-ENTMCNC: 32.2 G/DL (ref 31.5–36.5)
MCV RBC AUTO: 94 FL (ref 78–100)
MONOCYTES # BLD AUTO: 1.4 10E3/UL (ref 0–1.3)
MONOCYTES NFR BLD AUTO: 10 %
NEUTROPHILS # BLD AUTO: 10.8 10E3/UL (ref 1.6–8.3)
NEUTROPHILS NFR BLD AUTO: 82 %
NRBC # BLD AUTO: 0 10E3/UL
NRBC BLD AUTO-RTO: 0 /100
PLATELET # BLD AUTO: 192 10E3/UL (ref 150–450)
POTASSIUM SERPL-SCNC: 4.2 MMOL/L (ref 3.4–5.3)
RBC # BLD AUTO: 4.09 10E6/UL (ref 3.8–5.2)
SODIUM SERPL-SCNC: 138 MMOL/L (ref 135–145)
WBC # BLD AUTO: 13.2 10E3/UL (ref 4–11)

## 2023-12-24 PROCEDURE — 97530 THERAPEUTIC ACTIVITIES: CPT | Mod: GO

## 2023-12-24 PROCEDURE — 80048 BASIC METABOLIC PNL TOTAL CA: CPT | Performed by: HOSPITALIST

## 2023-12-24 PROCEDURE — 250N000011 HC RX IP 250 OP 636: Performed by: HOSPITALIST

## 2023-12-24 PROCEDURE — 97116 GAIT TRAINING THERAPY: CPT | Mod: GP

## 2023-12-24 PROCEDURE — 99231 SBSQ HOSP IP/OBS SF/LOW 25: CPT | Performed by: HOSPITALIST

## 2023-12-24 PROCEDURE — 250N000013 HC RX MED GY IP 250 OP 250 PS 637: Performed by: HOSPITALIST

## 2023-12-24 PROCEDURE — 36415 COLL VENOUS BLD VENIPUNCTURE: CPT | Performed by: HOSPITALIST

## 2023-12-24 PROCEDURE — 97165 OT EVAL LOW COMPLEX 30 MIN: CPT | Mod: GO

## 2023-12-24 PROCEDURE — 97530 THERAPEUTIC ACTIVITIES: CPT | Mod: GP

## 2023-12-24 PROCEDURE — 70450 CT HEAD/BRAIN W/O DYE: CPT | Mod: MG

## 2023-12-24 PROCEDURE — 120N000001 HC R&B MED SURG/OB

## 2023-12-24 PROCEDURE — 97161 PT EVAL LOW COMPLEX 20 MIN: CPT | Mod: GP

## 2023-12-24 PROCEDURE — 85004 AUTOMATED DIFF WBC COUNT: CPT | Performed by: HOSPITALIST

## 2023-12-24 RX ORDER — LABETALOL HYDROCHLORIDE 5 MG/ML
10 INJECTION, SOLUTION INTRAVENOUS EVERY 10 MIN PRN
Status: DISCONTINUED | OUTPATIENT
Start: 2023-12-24 | End: 2023-12-24

## 2023-12-24 RX ORDER — BISACODYL 10 MG
10 SUPPOSITORY, RECTAL RECTAL DAILY PRN
Status: DISCONTINUED | OUTPATIENT
Start: 2023-12-24 | End: 2023-12-28 | Stop reason: HOSPADM

## 2023-12-24 RX ORDER — LABETALOL HYDROCHLORIDE 5 MG/ML
10 INJECTION, SOLUTION INTRAVENOUS
Status: DISCONTINUED | OUTPATIENT
Start: 2023-12-24 | End: 2023-12-28 | Stop reason: HOSPADM

## 2023-12-24 RX ORDER — NALOXONE HYDROCHLORIDE 0.4 MG/ML
0.2 INJECTION, SOLUTION INTRAMUSCULAR; INTRAVENOUS; SUBCUTANEOUS
Status: DISCONTINUED | OUTPATIENT
Start: 2023-12-24 | End: 2023-12-28 | Stop reason: HOSPADM

## 2023-12-24 RX ORDER — NALOXONE HYDROCHLORIDE 0.4 MG/ML
0.4 INJECTION, SOLUTION INTRAMUSCULAR; INTRAVENOUS; SUBCUTANEOUS
Status: DISCONTINUED | OUTPATIENT
Start: 2023-12-24 | End: 2023-12-28 | Stop reason: HOSPADM

## 2023-12-24 RX ORDER — HYDRALAZINE HYDROCHLORIDE 20 MG/ML
10 INJECTION INTRAMUSCULAR; INTRAVENOUS EVERY 4 HOURS PRN
Status: DISCONTINUED | OUTPATIENT
Start: 2023-12-24 | End: 2023-12-28 | Stop reason: HOSPADM

## 2023-12-24 RX ORDER — HYDRALAZINE HYDROCHLORIDE 20 MG/ML
10 INJECTION INTRAMUSCULAR; INTRAVENOUS EVERY 10 MIN PRN
Status: DISCONTINUED | OUTPATIENT
Start: 2023-12-24 | End: 2023-12-24

## 2023-12-24 RX ADMIN — LABETALOL HYDROCHLORIDE 10 MG: 5 INJECTION INTRAVENOUS at 09:26

## 2023-12-24 RX ADMIN — LABETALOL HYDROCHLORIDE 10 MG: 5 INJECTION INTRAVENOUS at 23:29

## 2023-12-24 RX ADMIN — LIDOCAINE 1 PATCH: 4 PATCH TOPICAL at 08:06

## 2023-12-24 RX ADMIN — METOPROLOL SUCCINATE 50 MG: 50 TABLET, EXTENDED RELEASE ORAL at 08:04

## 2023-12-24 RX ADMIN — BISACODYL 10 MG: 10 SUPPOSITORY RECTAL at 18:27

## 2023-12-24 RX ADMIN — ACETAMINOPHEN 1000 MG: 500 TABLET, FILM COATED ORAL at 06:38

## 2023-12-24 RX ADMIN — ACETAMINOPHEN 1000 MG: 500 TABLET, FILM COATED ORAL at 13:44

## 2023-12-24 RX ADMIN — LABETALOL HYDROCHLORIDE 10 MG: 5 INJECTION INTRAVENOUS at 09:51

## 2023-12-24 RX ADMIN — ATORVASTATIN CALCIUM 40 MG: 40 TABLET, FILM COATED ORAL at 19:50

## 2023-12-24 RX ADMIN — DOXAZOSIN 4 MG: 4 TABLET ORAL at 19:50

## 2023-12-24 RX ADMIN — DOCUSATE SODIUM 50 MG AND SENNOSIDES 8.6 MG 1 TABLET: 8.6; 5 TABLET, FILM COATED ORAL at 13:47

## 2023-12-24 RX ADMIN — ACETAMINOPHEN 1000 MG: 500 TABLET, FILM COATED ORAL at 19:50

## 2023-12-24 ASSESSMENT — ACTIVITIES OF DAILY LIVING (ADL)
ADLS_ACUITY_SCORE: 29

## 2023-12-24 NOTE — PLAN OF CARE
Reason for Admission: IVH    Cognitive/Mentation: A/Ox 3-4, forgetful  Neuros/CMS: Intact   VS: BP elevated, given prn labetalol x2.   Tele: SR.  GI: Last BM 12/22/23. Continent. Pt reporting feeling constipated, senna x1 given  : Voiding without difficulty. Continent.  Pulmonary: LS clear throughout.  Pain: reports lower back pain, tylenol given x1.     Drains/Lines: PIV SL  Skin: scattered bruising  Activity: Assist x 2 with GB/W.  Diet: reg with thin liquids. Takes pills whole with crackers and water.     Therapies recs: pending  Discharge: pending    Aggression Stoplight Tool: green

## 2023-12-24 NOTE — PROGRESS NOTES
[facility]    Medicine Progress Note - Hospitalist Service    Date of Admission:  12/23/2023    Assessment & Plan     Angely Bryan is a 89 year old female with PMH significant for hypertension, dyslipidemia, CKD stage III, atrial fibrillation (on Eliquis), chronic low back pain with history of spinal stenosis (s/p fusion and epidural steroid injection in past), h/o CVA, h/o left carotid endarterectomy was brought to ED following a fall and some concern for confusion, noted with intraventricular hemorrhage, admitted inpatient 12/23/23.     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   -neurochecks  -fall and seizure precautions  -blood pressure control.      Worsening chronic low back pain, limited mobility secondary to above, H/o spinal stenosis (with h/o spinal fusion and epidural steroid injection) patient lives independently and family notes that her mobility has been getting very limited which might have contributed to her fall. had MRI lumbar spine done at outside hospital on 12/21/23 which was noted with multilevel spinal canal narrowing greatest at L2-L3, L3-L4 moderate to advanced; also noted multilevel foraminal narrowing greatest at L5-S1 on right, moderate to advanced. she had steroid injection in bilateral SI joint on 12/22 at outside hospital  Plan:   -Will have neurosurgery follow  -lidocaine patch  -PT/OT evaluation;  for disposition planning  -fall precautions     Hypertension, Dyslipidemia, atrial fibrillation (on Eliquis)  -holding anticoagulation   -hold PTA  aspirin  -hydralazine/labetalol PRN   -PTA Lipitor, doxazosin and Toprol-XL     Likely CKD stage III baseline creatinine noted around 1.3-1.4  -monitor      DVT prophylaxis: Defer to neuro; holding PTA Eliquis as above              Diet: Regular Diet Adult      Tuttle Catheter: Not present  Lines: None     Cardiac Monitoring: None  Code Status: No CPR- Do NOT Intubate      Clinically Significant Risk Factors Present on Admission               # Drug Induced Coagulation Defect: home medication list includes an anticoagulant medication  # Drug Induced Platelet Defect: home medication list includes an antiplatelet medication   # Hypertension: Noted on problem list                 Disposition Plan      Expected Discharge Date: 12/25/2023                    Preet Wislon DO  Hospitalist Service    Securely message with [employer] (more info)  Text page via [employer] Paging/Directory   ______________________________________________________________________    Interval History   Patient states she is doing well, discussed results of ct, verbalized understanding, denies further questions or concerns at this time.     Physical Exam   Vital Signs: Temp: 98  F (36.7  C) Temp src: Oral BP: 137/51 Pulse: 70   Resp: 16 SpO2: 90 % O2 Device: None (Room air)    Weight: 129 lbs 10.09 oz        GENERAL: Alert. NAD. Conversational, appropriate.   HEENT: Normocephalic. EOMI. No icterus or injection. Nares normal.   LUNGS: Clear to auscultation. No dyspnea at rest.   HEART: Irregular rate. Extremities perfused.   ABDOMEN: Soft, nontender, and nondistended. Positive bowel sounds.   EXTREMITIES: No LE edema noted.   NEUROLOGIC: Moves extremities x4 on command.        Medical Decision Making       31 MINUTES SPENT BY ME on the date of service doing chart review, history, exam, documentation & further activities per the note.      Data   Imaging results reviewed over the past 24 hrs:   Recent Results (from  the past 24 hour(s))   CT Head w/o Contrast   Result Value    Radiologist flags Acute intracranial hemorrhage (AA)    Narrative    EXAM: CT HEAD WITHOUT CONTRAST  LOCATION: [facility]  DATE: 12/23/2023    INDICATION: Cough, weakness, headache, back pain, abdominal pain.  COMPARISON: Images from MRI of the brain 07/29/2015.  TECHNIQUE: Routine CT Head without IV contrast. Multiplanar reformats. Dose reduction techniques were used.    FINDINGS:  INTRACRANIAL CONTENTS: There is moderate ventriculomegaly disproportionate to the degree of sulcal prominence. This primarily involves the bilateral lateral ventricles, third ventricle. There appears to be small volume acute intraventricular hemorrhage   layering in the occipital horns of the lateral ventricles on both sides (series 2 image 14). No other acute intracranial hemorrhage identified. There is moderate to severe patchy and confluent nonspecific hypoattenuation in the cerebral white matter,   presumably due to chronic small vessel ischemic disease. Mild to moderate generalized brain parenchymal volume loss. No extra-axial fluid collection or significant mass effect/herniation. Scattered atherosclerotic calcifications.    VISUALIZED ORBITS/SINUSES/MASTOIDS: Prior bilateral cataract surgery. Visualized portions of the orbits are otherwise unremarkable. No paranasal sinus mucosal disease. No middle ear or mastoid effusion.    BONES/SOFT TISSUES: No acute abnormality.      Impression    IMPRESSION:  1.  Findings concerning for a small volume acute intraventricular blood products layering in the occipital horns of the lateral ventricles.  2.  Moderate ventriculomegaly disproportionate to the degree of sulcal prominence. This appears slightly more pronounced compared to prior studies, raising concern for hydrocephalus.  3.  Brain atrophy and presumed chronic small vessel ischemic changes, as described.      [Critical Result: Acute intracranial  hemorrhage]    Finding was identified on 12/23/2023 12:22 PM CST.     Dr. Ocasio was contacted by me on 12/23/2023 12:32 PM CST and verbalized understanding of the critical result.        CT Abdomen Pelvis w Contrast    Narrative    EXAM: CT ABDOMEN PELVIS W CONTRAST  LOCATION: [facility]  DATE: 12/23/2023    INDICATION: cough, weakness, HA, back pain abdominal pain  COMPARISON: None.  TECHNIQUE: CT scan of the abdomen and pelvis was performed following injection of IV contrast. Multiplanar reformats were obtained. Dose reduction techniques were used.  CONTRAST: 65mL Isovue 370    FINDINGS:   LOWER CHEST: Right basilar atelectasis.    HEPATOBILIARY: Normal liver contours. Focal fat deposition adjacent to the falciform. Gallbladder is unremarkable. No biliary ductal dilation.    PANCREAS: Normal.    SPLEEN: Normal.    ADRENAL GLANDS: Normal.    KIDNEYS/BLADDER: Kidneys enhance symmetrically. Bilateral benign cysts (which require no follow-up). No urolithiasis or hydronephrosis. Normal bladder contours.    BOWEL: No bowel obstruction. Sigmoid diverticulosis without evidence of diverticulitis. No free fluid or free air. Large hiatal hernia.    LYMPH NODES: No lymphadenopathy.    VASCULATURE: Severe atherosclerotic calcifications of the abdominal aorta and its branches without aneurysm.    PELVIC ORGANS: Uterus is present. No adnexal masses.    MUSCULOSKELETAL: Lower lumbar spinal fusion hardware. Bones are demineralized. Multilevel degenerative disc disease and facet osteoarthritis. No destructive osseous lesions or acute fracture.      Impression    IMPRESSION:   1.  No acute abnormality in the abdomen or pelvis.  2.  Additional chronic details in the findings.   MR Brain w/o & w Contrast    Narrative    EXAM: MR BRAIN W/O and W CONTRAST  LOCATION: [facility]  DATE: 12/23/2023    INDICATION: NPH  COMPARISON: Same day head CT. Head and neck CTA:  08/18/2015.  CONTRAST: 5mL Gadavist  TECHNIQUE: Routine multiplanar multisequence head MRI without and with intravenous contrast.    FINDINGS:  INTRACRANIAL CONTENTS: No restricted diffusion to suggest acute infarct. Similar small volume blood products layering in the occipital horns. There is confluent T2/FLAIR hyperintense signal abnormality in the periventricular white matter and subcortical   white matter of both cerebral hemispheres. This is grossly similar to degree of periventricular hypoattenuation from head and neck CTA performed in August 2015. Similar diffuse ventriculomegaly. Moderate generalized parenchymal volume loss. Normal   position of the cerebellar tonsils. No pathologic contrast enhancement.    SELLA: No abnormality accounting for technique.    OSSEOUS STRUCTURES/SOFT TISSUES: Normal marrow signal. The major intracranial vascular flow voids are maintained.     ORBITS: No abnormality accounting for technique.     SINUSES/MASTOIDS: No paranasal sinus mucosal disease. No middle ear or mastoid effusion.       Impression    IMPRESSION:    1.  Similar small volume blood products layering in the occipital horns.  2.  Similar diffuse ventriculomegaly from same day CT study, which could represent hydrocephalus. Confluent T2/FLAIR hyperintense signal abnormality in the periventricular white matter of both cerebral hemispheres is grossly similar to CTA study from   August 2015 and most like represents the sequela of advanced chronic microvascular ischemic disease with some degree of mild underlying transependymal edema difficult to exclude entirely.  3.  No evidence of acute infarct.  4.  No pathologic contrast enhancement.   CT Head w/o Contrast    Narrative    EXAM: CT HEAD WITHOUT CONTRAST  LOCATION: [facility]  DATE: 12/24/2023    INDICATION: Follow-up intraventricular hemorrhage.  COMPARISON: CT head 12/23/2023.  TECHNIQUE: Routine CT Head without IV contrast. Multiplanar  reformats. Dose reduction techniques were used.    FINDINGS:  INTRACRANIAL CONTENTS: The previously seen intraventricular hemorrhage layering within the occipital horns has resolved. No new hemorrhage. No CT evidence of acute infarct. Moderate to advanced presumed chronic small vessel ischemic changes. Moderate   generalized volume loss with disproportionate enlargement of the ventricles compared to the sulci.     VISUALIZED ORBITS/SINUSES/MASTOIDS: No intraorbital abnormality. No paranasal sinus mucosal disease. No middle ear or mastoid effusion.    BONES/SOFT TISSUES: No acute abnormality.      Impression    IMPRESSION:  1.  Interval resolution of intraventricular hemorrhage. No new hemorrhage.  2.  Unchanged generalized volume loss and disproportionate enlargement of the ventricles compared to the sulci suggesting a component of hydrocephalus.  3.  Moderate to advanced chronic microvascular ischemic change.

## 2023-12-24 NOTE — PROGRESS NOTES
12/24/23 1411   Appointment Info   Signing Clinician's Name / Credentials (OT) Fiordaliza Patterson OTR/L   Living Environment   People in Home alone   Current Living Arrangements independent living facility   Living Environment Comments Has 4 supportive children.   Self-Care   Activity/Exercise/Self-Care Comment Someone supervises her when she showers, does finances. Meds come to her pre packaged. She can get meals in DR but does cook some for herself.   General Information   Onset of Illness/Injury or Date of Surgery 12/23/23   Referring Physician Liza   Patient/Family Therapy Goal Statement (OT) None stated.   Additional Occupational Profile Info/Pertinent History of Current Problem Admitted after fall. Small IVH noted, ?NPH.   Existing Precautions/Restrictions fall   Cognitive Status Examination   Orientation Status not oriented to person, place or time   Cognitive Status Comments Some confusion noted with conversation. Will monitor and screen as needed.   Visual Perception   Impact of Vision Impairment on Function (Vision) WNL   Pain Assessment   Patient Currently in Pain Yes, see Vital Sign flowsheet  (rates tailbone pain 9/10)   Range of Motion Comprehensive   Comment, General Range of Motion B shoulder flex to ~90, otherwise WNL.   Strength Comprehensive (MMT)   Comment, General Manual Muscle Testing (MMT) Assessment Grossly WFL.   Coordination   Coordination Comments Appears WNL.   Transfers   Transfer Comments MIN A   Balance   Balance Comments MIN A for balance in standing.   Activities of Daily Living   BADL Assessment/Intervention   (IND eating, SB A LE dressing (socks and shoes).)   Clinical Impression   Criteria for Skilled Therapeutic Interventions Met (OT) Yes, treatment indicated   OT Diagnosis Decreased ADL   Influenced by the following impairments pain, impaired balance, confusion   OT Problem List-Impairments impacting ADL activity tolerance impaired;balance;cognition;pain   Assessment of  Occupational Performance 1-3 Performance Deficits   Identified Performance Deficits ADL, mobility   Planned Therapy Interventions (OT) ADL retraining;transfer training   Clinical Decision Making Complexity (OT) problem focused assessment/low complexity   Risk & Benefits of therapy have been explained evaluation/treatment results reviewed;care plan/treatment goals reviewed;patient   OT Total Evaluation Time   OT Eval, Low Complexity Minutes (58215) 10   OT Goals   Therapy Frequency (OT) 5 times/week   OT Predicted Duration/Target Date for Goal Attainment 12/29/23   OT Goals Hygiene/Grooming;Upper Body Dressing;Lower Body Dressing;Toilet Transfer/Toileting;Cognition   OT: Hygiene/Grooming independent;while standing   OT: Upper Body Dressing Independent;including set-up/clothing retrieval   OT: Lower Body Dressing Independent;including set-up/clothing retrieval   OT: Toilet Transfer/Toileting Independent;toilet transfer;cleaning and garment management   OT: Cognitive Patient/caregiver will verbalize understanding of cognitive assessment results/recommendations as needed for safe discharge planning   Interventions   Interventions Quick Adds Self-Care/Home Management;Therapeutic Activity   Therapeutic Activities   Therapeutic Activity Minutes (67520) 8   Symptoms noted during/after treatment increased pain   Treatment Detail/Skilled Intervention Second sit<>stand with good hand placement, to place cushion to alleviate pain in tailbone. Once standing, patient needing cues to hold on to me vs her table, needing MIN A for support. Short session as her lunch arrived and she's wanting back to bed as she didn't get any sleep last night.   OT Discharge Planning   OT Plan toilet tx, whole body dressing   OT Discharge Recommendation (DC Rec) Transitional Care Facility   OT Rationale for DC Rec Patient below her IND baseline limited by pain, some confusion and impaired balance.   OT Brief overview of current status MIN A for  standing, SB A LE dressing   Total Session Time   Timed Code Treatment Minutes 8   Total Session Time (sum of timed and untimed services) 18

## 2023-12-24 NOTE — PROGRESS NOTES
"   12/24/23 1145   Appointment Info   Signing Clinician's Name / Credentials (PT) Celestine Moss, PT, DPT   Rehab Comments (PT) SBP <150   Living Environment   People in Home alone   Current Living Arrangements independent living facility   Home Accessibility no concerns   Transportation Anticipated family or friend will provide   Living Environment Comments Pt lives alone in an IND living apartment. No stairs to enter. Children provide rides at baseline   Self-Care   Usual Activity Tolerance moderate   Current Activity Tolerance fair   Regular Exercise No   Equipment Currently Used at Home walker, rolling   Fall history within last six months yes   Number of times patient has fallen within last six months 2   Activity/Exercise/Self-Care Comment Pt reporting using 4WW at baseline with all mobility. IND with ADLs within apartment. Children assist with most IADLs including groceries and laundry   General Information   Onset of Illness/Injury or Date of Surgery 12/23/23   Referring Physician Ryan De Jesus MD   Patient/Family Therapy Goals Statement (PT) get stronger, decrease pain   Pertinent History of Current Problem (include personal factors and/or comorbidities that impact the POC) Per chart review, \"Angely Bryan is a 89 year old female with PMH significant for hypertension, dyslipidemia, CKD stage III, atrial fibrillation (on Eliquis), chronic low back pain with history of spinal stenosis (s/p fusion and epidural steroid injection in past), h/o CVA, h/o left carotid endarterectomy was brought to ED following a fall and some concern for confusion, noted with intraventricular hemorrhage, admitted inpatient 12/23/23.\"   Existing Precautions/Restrictions fall   Cognition   Affect/Mental Status (Cognition) WFL   Orientation Status (Cognition) oriented x 4   Follows Commands (Cognition) WFL   Cognitive Status Comments mild confused about location, but able to state they are in a hospital   Integumentary/Edema "   Integumentary/Edema no deficits were identifed   Posture    Posture Forward head position;Protracted shoulders;Kyphosis   Range of Motion (ROM)   Range of Motion ROM deficits secondary to pain   ROM Comment Lumbar and thoracic spine AROM moderately limited 2/2 pain   Strength (Manual Muscle Testing)   Strength (Manual Muscle Testing) Deficits observed during functional mobility   Strength Comments mild functional strength deficits   Bed Mobility   Comment, (Bed Mobility) CGA supine>sit   Transfers   Comment, (Transfers) min A sit>stand w/ FWW   Gait/Stairs (Locomotion)   Distance in Feet (Gait) 5' eval   Comment, (Gait/Stairs) pt ambulated 5' w/ FWW and min A for eval   Balance   Balance Comments impaired dynamic balance   Sensory Examination   Sensory Perception patient reports no sensory changes   Clinical Impression   Criteria for Skilled Therapeutic Intervention Yes, treatment indicated   PT Diagnosis (PT) impaired functional mobility   Influenced by the following impairments impaired strength, balance, activity tolerance, low back pain   Functional limitations due to impairments limited IND with mobility   Clinical Presentation (PT Evaluation Complexity) stable   Clinical Presentation Rationale clinical judgement   Clinical Decision Making (Complexity) low complexity   Planned Therapy Interventions (PT) balance training;bed mobility training;gait training;home exercise program;neuromuscular re-education;patient/family education;strengthening;ROM (range of motion);transfer training;progressive activity/exercise;home program guidelines   Risk & Benefits of therapy have been explained evaluation/treatment results reviewed;care plan/treatment goals reviewed;risks/benefits reviewed;current/potential barriers reviewed;participants voiced agreement with care plan;participants included;patient   PT Total Evaluation Time   PT Eval, Low Complexity Minutes (70960) 10   Physical Therapy Goals   PT Frequency 5x/week   PT  Predicted Duration/Target Date for Goal Attainment 12/31/23   PT Goals Bed Mobility;Transfers;Gait   PT: Bed Mobility Independent;Supine to/from sit   PT: Transfers Modified independent;Sit to/from stand;Bed to/from chair;Assistive device   PT: Gait Modified independent;Rolling walker;150 feet   Interventions   Interventions Quick Adds Gait Training;Therapeutic Activity   Therapeutic Activity   Therapeutic Activities: dynamic activities to improve functional performance Minutes (49497) 20   Symptoms Noted During/After Treatment Increased pain   Treatment Detail/Skilled Intervention Greeted pt upon arrival to room. Pt agreeable to working with PT. Systolic  prior to mobility, under <150 SBP parameter. Supine>sit w/ CGA. Cueing for safe and efficient sit<>stand procedure including scooting to the front edge of the chair, to lean forward with nose over toes, and hand and foot placement. Pt noting mild dizziness with change in position, improved with seated rest break. Sit>stand w/ FWW and min A from edge of bed. Cueing for safe and efficient sit<>stand procedure including scooting to the front edge of the chair, to lean forward with nose over toes, and hand and foot placement. Stand pivot transfer bed>recliner w/ FWW and min A. Cueing to feel the chair with both legs prior to sitting. Pt left with all needs met and call light within reach.   Gait Training   Gait Training Minutes (11511) 10   Symptoms Noted During/After Treatment (Gait Training) fatigue;increased pain   Treatment Detail/Skilled Intervention Pt ambulated 25' w/ FWW and min A, progressing to mostly CGA throughout. Noting pt demonstrating forward head  posture with head and eyes facing downward and stooped forward posture. Cueing to stand tall with head and eyes up. Improved upright posture following cueing. Noting poor clearance and step length of LLE with ambulation. Cueing to lift LLE while stepping with moderate improvement.   Distance in Feet  25'   PT Discharge Planning   PT Plan PT: increase ambulation distance, bring 4WW, independence with transfers and ambulation   PT Discharge Recommendation (DC Rec) Transitional Care Facility   PT Rationale for DC Rec Pt is below baseline. Pt currently requiring assist with all functional mobility. Ambulation distance limited to ~25' at this time. Pt presents with deficits in activity tolerance, balance, and strength. Due to these deficits, pt is a high falls risk and unsafe to return home at this time, as pt lives alone. Pt would benefit from continued skilled PT services via TCU to address deficits and improve IND with safety and functional mobility in order to return to [facility].   PT Brief overview of current status A x1 w/ FWW   Total Session Time   Timed Code Treatment Minutes 30   Total Session Time (sum of timed and untimed services) 40

## 2023-12-24 NOTE — PROGRESS NOTES
Neurosurgery progress note:    Patient states she's feeling well today.  Denies headache.  Complains of left sided low back pain but no radicular pain.    On exam, she's awake and alert, able to stand with assistance.  Better range of motion today, able to lift both arms up well  without drift noted.  Anti-gravity with lower extremities.  Neuro intact.    Head CT today reveals resolution of the small ICH.    Head CT 12/24/23  IMPRESSION:  1.  Interval resolution of intraventricular hemorrhage. No new hemorrhage.  2.  Unchanged generalized volume loss and disproportionate enlargement of the ventricles compared to the sulci suggesting a component of hydrocephalus.  3.  Moderate to advanced chronic microvascular ischemic change.    Brain MRI 12/23/23  IMPRESSION:     1.  Similar small volume blood products layering in the occipital horns.  2.  Similar diffuse ventriculomegaly from same day CT study, which could represent hydrocephalus. Confluent T2/FLAIR hyperintense signal abnormality in the periventricular white matter of both cerebral hemispheres is grossly similar to CTA study from August 2015 and most like represents the sequela of advanced chronic microvascular ischemic disease with some degree of mild underlying transependymal edema difficult to exclude entirely.  3.  No evidence of acute infarct.  4.  No pathologic contrast enhancement.      RECOMMENDATIONS:  No further imaging or follow up needed for small IVH, now resolved.  Do recommend holding anti-coagulation for 1 week ( 7 days)  In regard to ventriculomegaly consulted Neurology in case patient would like high volume LP.  In regard to back pain and lumbar spine no radicular symptoms today.  We'd be happy to see patient as an outpatient for her lumbar spine if desired.    Discussed with Dr. Haines.    Linda Corea, MPAS  [facility]  [address] [address]    Tel  [phone]  Pager [phone]

## 2023-12-24 NOTE — PROGRESS NOTES
RECEIVING UNIT ED HANDOFF REVIEW    ED Nurse Handoff Report was reviewed by: Mayur Henley RN on December 23, 2023 at 8:43 PM

## 2023-12-24 NOTE — PLAN OF CARE
Pt here with a fall/intraventricular hemorrhage. A&O. Neuros intact, except for baseline weakness in legs. Eliquis on hold. Forgetful. VSS, borderline BP. Regular diet, thin liquids. Takes pills whole. Up with A1-2, gb, walker. Reports constipation, received suppository. Pt scoring green on the Aggression Stop Light Tool. Discharge to TCU pending placement.

## 2023-12-24 NOTE — CONSULTS
[facility]    Neurology Consultation     Date of Admission:  12/23/2023    Assessment & Plan   Angely Bryan is a 89 year old female who was admitted on 12/23/2023. I was asked to see the patient for NPH.  The patient is a 89-year-old lady with multiple medical problems, vascular risk factors hypertension dyslipidemia, atrial fibrillation, chronic kidney disease, history of CVA and left carotid endarterectomy.  The patient had a fall and subsequently a small bleed.  She does have mild memory loss, very likely the gait difficulties multifactorial no at this time I would not t necessary due to hydrocephalus.    -I think the MRI shows significant atrophy and probably more central atrophy, significant small vessel ischemic disease.  I do not think the patient needs a lumbar puncture or further evaluation for NPH.  I also discussed with the patient about this and she is not interested either to pursue the testing with a lumbar puncture.  -Recommend to do an EEG to exclude the possibility of a seizure the basis of her fall.  -Occupational Therapy to do further mental status exam assess capacity to take care of self  -Physical therapy and consideration for TCU and rehab.  -The patient should follow-up in neurology following discharge.    Neurology will sign off if there are any questions or concerns please call us.    Mary Lou Benjamin MD    Code Status    No CPR- Do NOT Intubate    Reason for Consult   Reason for consult: I was asked by BLESSING [employer]a to evaluate this patient for NPH.  The patient lives independently, she was admitted after having a fall.  The patient states that she has had another fall a couple of years ago when she had fracture of ribs and was further admitted in nursing home to recuperate.  She states that her gait has been worse.  She is using a walker.  The patient remember falling on her buttock and hitting the head.  This is what she told me however at admission the  patient denied remembering falling.  The patient has had low back pain and had a steroid injection in both SI joints.  CT scan of the head on admission shows small volume of acute intraventricular hemorrhage he in occipital horns of the lateral ventricle.  It was also noticed ventriculomegaly questionable out of proportion of atrophy.    The patient states that overall she is independent.  She orders food for herself and sometimes she is preparing food for herself.  She gave up driving 3 years ago.  The patient admits for having some mild memory loss.    Presently she complains of tailbone pain.  She has had some difficulty moving the right lower extremity due to that.    Primary Care Physician   JERMAINE PORTER    Chief Complaint   NPH    History is obtained from the patient and electronic health record    History of Present Illness   Angely Bryan is a 89 year old female who presents with     Past Medical History   I have reviewed this patient's medical history and updated it with pertinent information if needed.   Past Medical History:   Diagnosis Date    Arthritis     Cancer (H) 1974    breast, left mastectomy    Chronic anticoagulation     COPD (chronic obstructive pulmonary disease) (H)     CVA (cerebral infarction) 7/3-/2015    Left sided CVA    GERD (gastroesophageal reflux disease)     Hypertension     Osteopenia 6/2011    T score of -2.3 in hip    Paroxysmal atrial fibrillation (H)     Peptic ulcer     Unspecified cerebral artery occlusion with cerebral infarction     Vitamin B deficiency 8/14/2015    Vitamin D deficiency        Past Surgical History   I have reviewed this patient's surgical history and updated it with pertinent information if needed.  Past Surgical History:   Procedure Laterality Date    appendectomy      ENDARTERECTOMY CAROTID Left 8/18/2015    Procedure: ENDARTERECTOMY CAROTID;  Surgeon: Arnold Abel MD;  Location: [facility] OR    MASTECTOMY Left 10/20/1974    ORTHOPEDIC SURGERY  7/1/2005     Lumbar Fusion    ORTHOPEDIC SURGERY      Knee Arthroscopy       Prior to Admission Medications   Prior to Admission Medications   Prescriptions Last Dose Informant Patient Reported? Taking?   Vitamin D3 (CHOLECALCIFEROL) 25 mcg (1000 units) tablet 12/22/2023  Yes Yes   Sig: Take 1 tablet by mouth daily   acetaminophen (TYLENOL) 500 MG tablet 12/23/2023 at am  Yes Yes   Sig: Take 1,000 mg by mouth every 6 hours as needed for mild pain   albuterol (ALBUTEROL) 108 (90 BASE) MCG/ACT inhaler  Self Yes Yes   Sig: Inhale 2 puffs into the lungs as needed (use sparingly)    apixaban ANTICOAGULANT (ELIQUIS) 2.5 MG tablet 12/22/2023 at 1800  Yes Yes   Sig: Take 2.5 mg by mouth 2 times daily   aspirin 81 MG tablet 12/22/2023 Self Yes Yes   Sig: Take 81 mg by mouth daily   atorvastatin (LIPITOR) 40 MG tablet 12/22/2023 Self Yes Yes   Sig: Take 40 mg by mouth every evening   cyanocobalamin (VITAMIN B-12) 1000 MCG tablet 12/22/2023  Yes Yes   Sig: Take 1,000 mcg by mouth daily   doxazosin (CARDURA) 4 MG tablet 12/22/2023  Yes Yes   Sig: Take 4 mg by mouth at bedtime   magnesium oxide 400 MG CAPS 12/22/2023  Yes Yes   Sig: Take 1 capsule by mouth daily   metoprolol succinate ER (TOPROL XL) 50 MG 24 hr tablet 12/22/2023  Yes Yes   Sig: Take 50 mg by mouth daily      Facility-Administered Medications: None     Allergies   Allergies   Allergen Reactions    Losartan Swelling     Tongue swelling    Penicillins Rash     Tongue swelling        Social History   I have reviewed this patient's social history and updated it with pertinent information if needed. Angely Bryan  reports that she quit smoking about 32 years ago. Her smoking use included cigarettes. She has a 80 pack-year smoking history. She does not have any smokeless tobacco history on file. She reports current alcohol use. She reports that she does not use drugs.    Family History   I have reviewed this patient's family history and updated it with pertinent information if  needed.   Family History   Problem Relation Age of Onset    Diabetes Mother     Hypertension Mother     Pulmonary Embolism Father     Cancer Sister     Arrhythmia Sister     Arrhythmia Son        Review of Systems   The 10 point Review of Systems is negative other than noted in the HPI or here.     Physical Exam   Temp: 98.3  F (36.8  C) Temp src: Oral BP: 127/71 Pulse: 74   Resp: 16 SpO2: 92 % O2 Device: None (Room air)    Vital Signs with Ranges  Temp:  [97.7  F (36.5  C)-98.3  F (36.8  C)] 98.3  F (36.8  C)  Pulse:  [66-92] 74  Resp:  [8-31] 16  BP: ()/() 127/71  SpO2:  [90 %-98 %] 92 %  129 lbs 10.09 oz    Constitutional: Normal  Eyes: No conjunctival erythema  ENT: Neck is supple  Respiratory: CTA  Cardiovascular: RRR  Skin: No obvious lesions  Musculoskeletal: No pedal edema  The patient is alert oriented x3 no acute distress.  Speech is fluent there is no aphasia apraxia or agnosia.  The patient knows the name of the president.  She can spell world forward but had difficulty spelling it backwards more than 3 letters.  Memory was 4 out of 4 at 1 minute and only 3 out of 4 at 5 minutes.  Pupils are equal round reactive to light extraocular movements are intact, there is no nystagmus.  Facial muscles are equal bilaterally.  Uvula and tongue are midline.  There is mild weakness of hip flexion but the patient is in pain.  Muscular mass tone and strength are normal in all 4 extremities there is no pronator drift.  Reflexes are decreased in both upper and lower extremities.  Equivocal Babinski.  Sensory exam is normal to light touch, however the patient was unsure of her answers.  Vibratory sense was decreased at the knees bilaterally.  Finger-nose-finger without dysmetria.  Fine movements are normal.    Gait was deferred  Neuropsychiatric: Appropriate    Data   Results for orders placed or performed during the hospital encounter of 12/23/23 (from the past 24 hour(s))   CT Head w/o Contrast   Result  Value Ref Range    Radiologist flags Acute intracranial hemorrhage (AA)     Narrative    EXAM: CT HEAD WITHOUT CONTRAST  LOCATION: [facility]  DATE: 12/23/2023    INDICATION: Cough, weakness, headache, back pain, abdominal pain.  COMPARISON: Images from MRI of the brain 07/29/2015.  TECHNIQUE: Routine CT Head without IV contrast. Multiplanar reformats. Dose reduction techniques were used.    FINDINGS:  INTRACRANIAL CONTENTS: There is moderate ventriculomegaly disproportionate to the degree of sulcal prominence. This primarily involves the bilateral lateral ventricles, third ventricle. There appears to be small volume acute intraventricular hemorrhage   layering in the occipital horns of the lateral ventricles on both sides (series 2 image 14). No other acute intracranial hemorrhage identified. There is moderate to severe patchy and confluent nonspecific hypoattenuation in the cerebral white matter,   presumably due to chronic small vessel ischemic disease. Mild to moderate generalized brain parenchymal volume loss. No extra-axial fluid collection or significant mass effect/herniation. Scattered atherosclerotic calcifications.    VISUALIZED ORBITS/SINUSES/MASTOIDS: Prior bilateral cataract surgery. Visualized portions of the orbits are otherwise unremarkable. No paranasal sinus mucosal disease. No middle ear or mastoid effusion.    BONES/SOFT TISSUES: No acute abnormality.      Impression    IMPRESSION:  1.  Findings concerning for a small volume acute intraventricular blood products layering in the occipital horns of the lateral ventricles.  2.  Moderate ventriculomegaly disproportionate to the degree of sulcal prominence. This appears slightly more pronounced compared to prior studies, raising concern for hydrocephalus.  3.  Brain atrophy and presumed chronic small vessel ischemic changes, as described.      [Critical Result: Acute intracranial hemorrhage]    Finding was identified on  12/23/2023 12:22 PM CST.     Dr. Ocasio was contacted by me on 12/23/2023 12:32 PM CST and verbalized understanding of the critical result.        CT Abdomen Pelvis w Contrast    Narrative    EXAM: CT ABDOMEN PELVIS W CONTRAST  LOCATION: [facility]  DATE: 12/23/2023    INDICATION: cough, weakness, HA, back pain abdominal pain  COMPARISON: None.  TECHNIQUE: CT scan of the abdomen and pelvis was performed following injection of IV contrast. Multiplanar reformats were obtained. Dose reduction techniques were used.  CONTRAST: 65mL Isovue 370    FINDINGS:   LOWER CHEST: Right basilar atelectasis.    HEPATOBILIARY: Normal liver contours. Focal fat deposition adjacent to the falciform. Gallbladder is unremarkable. No biliary ductal dilation.    PANCREAS: Normal.    SPLEEN: Normal.    ADRENAL GLANDS: Normal.    KIDNEYS/BLADDER: Kidneys enhance symmetrically. Bilateral benign cysts (which require no follow-up). No urolithiasis or hydronephrosis. Normal bladder contours.    BOWEL: No bowel obstruction. Sigmoid diverticulosis without evidence of diverticulitis. No free fluid or free air. Large hiatal hernia.    LYMPH NODES: No lymphadenopathy.    VASCULATURE: Severe atherosclerotic calcifications of the abdominal aorta and its branches without aneurysm.    PELVIC ORGANS: Uterus is present. No adnexal masses.    MUSCULOSKELETAL: Lower lumbar spinal fusion hardware. Bones are demineralized. Multilevel degenerative disc disease and facet osteoarthritis. No destructive osseous lesions or acute fracture.      Impression    IMPRESSION:   1.  No acute abnormality in the abdomen or pelvis.  2.  Additional chronic details in the findings.   Troponin T, High Sensitivity   Result Value Ref Range    Troponin T, High Sensitivity 14 <=14 ng/L   UA with Microscopic reflex to Culture    Specimen: Urine, Clean Catch   Result Value Ref Range    Color Urine Light Yellow Colorless, Straw, Light Yellow, Yellow     Appearance Urine Clear Clear    Glucose Urine Negative Negative mg/dL    Bilirubin Urine Negative Negative    Ketones Urine Negative Negative mg/dL    Specific Gravity Urine 1.032 1.003 - 1.035    Blood Urine Negative Negative    pH Urine 6.5 5.0 - 7.0    Protein Albumin Urine 10 (A) Negative mg/dL    Urobilinogen Urine Normal Normal, 2.0 mg/dL    Nitrite Urine Negative Negative    Leukocyte Esterase Urine Negative Negative    Mucus Urine Present (A) None Seen /LPF    RBC Urine 1 <=2 /HPF    WBC Urine 1 <=5 /HPF    Squamous Epithelials Urine <1 <=1 /HPF    Narrative    Urine Culture not indicated   MR Brain w/o & w Contrast    Narrative    EXAM: MR BRAIN W/O and W CONTRAST  LOCATION: [facility]  DATE: 12/23/2023    INDICATION: NPH  COMPARISON: Same day head CT. Head and neck CTA: 08/18/2015.  CONTRAST: 5mL Gadavist  TECHNIQUE: Routine multiplanar multisequence head MRI without and with intravenous contrast.    FINDINGS:  INTRACRANIAL CONTENTS: No restricted diffusion to suggest acute infarct. Similar small volume blood products layering in the occipital horns. There is confluent T2/FLAIR hyperintense signal abnormality in the periventricular white matter and subcortical   white matter of both cerebral hemispheres. This is grossly similar to degree of periventricular hypoattenuation from head and neck CTA performed in August 2015. Similar diffuse ventriculomegaly. Moderate generalized parenchymal volume loss. Normal   position of the cerebellar tonsils. No pathologic contrast enhancement.    SELLA: No abnormality accounting for technique.    OSSEOUS STRUCTURES/SOFT TISSUES: Normal marrow signal. The major intracranial vascular flow voids are maintained.     ORBITS: No abnormality accounting for technique.     SINUSES/MASTOIDS: No paranasal sinus mucosal disease. No middle ear or mastoid effusion.       Impression    IMPRESSION:    1.  Similar small volume blood products layering in the  occipital horns.  2.  Similar diffuse ventriculomegaly from same day CT study, which could represent hydrocephalus. Confluent T2/FLAIR hyperintense signal abnormality in the periventricular white matter of both cerebral hemispheres is grossly similar to CTA study from   August 2015 and most like represents the sequela of advanced chronic microvascular ischemic disease with some degree of mild underlying transependymal edema difficult to exclude entirely.  3.  No evidence of acute infarct.  4.  No pathologic contrast enhancement.   CT Head w/o Contrast    Narrative    EXAM: CT HEAD WITHOUT CONTRAST  LOCATION: [facility]  DATE: 12/24/2023    INDICATION: Follow-up intraventricular hemorrhage.  COMPARISON: CT head 12/23/2023.  TECHNIQUE: Routine CT Head without IV contrast. Multiplanar reformats. Dose reduction techniques were used.    FINDINGS:  INTRACRANIAL CONTENTS: The previously seen intraventricular hemorrhage layering within the occipital horns has resolved. No new hemorrhage. No CT evidence of acute infarct. Moderate to advanced presumed chronic small vessel ischemic changes. Moderate   generalized volume loss with disproportionate enlargement of the ventricles compared to the sulci.     VISUALIZED ORBITS/SINUSES/MASTOIDS: No intraorbital abnormality. No paranasal sinus mucosal disease. No middle ear or mastoid effusion.    BONES/SOFT TISSUES: No acute abnormality.      Impression    IMPRESSION:  1.  Interval resolution of intraventricular hemorrhage. No new hemorrhage.  2.  Unchanged generalized volume loss and disproportionate enlargement of the ventricles compared to the sulci suggesting a component of hydrocephalus.  3.  Moderate to advanced chronic microvascular ischemic change.     CBC with platelets differential    Narrative    The following orders were created for panel order CBC with platelets differential.  Procedure                               Abnormality         Status                      ---------                               -----------         ------                     CBC with platelets and d...[[id number]]  Abnormal            Final result                 Please view results for these tests on the individual orders.   Basic metabolic panel   Result Value Ref Range    Sodium 138 135 - 145 mmol/L    Potassium 4.2 3.4 - 5.3 mmol/L    Chloride 103 98 - 107 mmol/L    Carbon Dioxide (CO2) 27 22 - 29 mmol/L    Anion Gap 8 7 - 15 mmol/L    Urea Nitrogen 37.4 (H) 8.0 - 23.0 mg/dL    Creatinine 1.35 (H) 0.51 - 0.95 mg/dL    GFR Estimate 37 (L) >60 mL/min/1.73m2    Calcium 9.5 8.2 - 9.6 mg/dL    Glucose 111 (H) 70 - 99 mg/dL   CBC with platelets and differential   Result Value Ref Range    WBC Count 13.2 (H) 4.0 - 11.0 10e3/uL    RBC Count 4.09 3.80 - 5.20 10e6/uL    Hemoglobin 12.4 11.7 - 15.7 g/dL    Hematocrit 38.5 35.0 - 47.0 %    MCV 94 78 - 100 fL    MCH 30.3 26.5 - 33.0 pg    MCHC 32.2 31.5 - 36.5 g/dL    RDW 12.6 10.0 - 15.0 %    Platelet Count 192 150 - 450 10e3/uL    % Neutrophils 82 %    % Lymphocytes 7 %    % Monocytes 10 %    % Eosinophils 0 %    % Basophils 0 %    % Immature Granulocytes 1 %    NRBCs per 100 WBC 0 <1 /100    Absolute Neutrophils 10.8 (H) 1.6 - 8.3 10e3/uL    Absolute Lymphocytes 0.9 0.8 - 5.3 10e3/uL    Absolute Monocytes 1.4 (H) 0.0 - 1.3 10e3/uL    Absolute Eosinophils 0.0 0.0 - 0.7 10e3/uL    Absolute Basophils 0.0 0.0 - 0.2 10e3/uL    Absolute Immature Granulocytes 0.1 <=0.4 10e3/uL    Absolute NRBCs 0.0 10e3/uL     The patient has had an MRI of the brain on 12/23/2023 which showed similar small volume blood products layering in the occipital horns.  There is supposedly ventriculomegaly questionable hydrocephalus.  However I reviewed the films and the patient has significant small vessel ischemic disease as well as generalized atrophy.  I think that very likely this represent more atrophy, central atrophy rather than NPH.  The patient is a 89-year-old  lady with multiple medical problems

## 2023-12-24 NOTE — CONSULTS
Care Management Initial Consult    General Information  Assessment completed with: Patient, Children,    Type of CM/SW Visit: Initial Assessment    Primary Care Provider verified and updated as needed: Yes   Readmission within the last 30 days: no previous admission in last 30 days      Reason for Consult: discharge planning  Advance Care Planning: Advance Care Planning Reviewed: no concerns identified        Communication Assessment  Patient's communication style: spoken language (English or Bilingual)    Hearing Difficulty or Deaf: no   Wear Glasses or Blind: no    Cognitive  Cognitive/Neuro/Behavioral: .WDL except  Level of Consciousness: alert  Arousal Level: opens eyes spontaneously  Orientation: disoriented to, time  Mood/Behavior: calm, cooperative  Best Language: 0 - No aphasia  Speech: clear, spontaneous, logical    Living Environment:   People in home: alone     Current living Arrangements: apartment, independent living facility      Able to return to prior arrangements: yes     Family/Social Support:  Care provided by: self  Provides care for: no one  Marital Status:   Children          Description of Support System: Supportive, Involved    Support Assessment: Adequate family and caregiver support, Adequate social supports    Current Resources:   Patient receiving home care services: No     Community Resources: None  Equipment currently used at home: walker, rolling  Supplies currently used at home:      Employment/Financial:  Employment Status: retired        Financial Concerns: none   Referral to Financial Worker: No     Does the patient's insurance plan have a 3 day qualifying hospital stay waiver?  No    Lifestyle & Psychosocial Needs:  Social Determinants of Health     Food Insecurity: Not on file   Depression: Not on file   Housing Stability: Not on file   Tobacco Use: Medium Risk (12/23/2023)    Patient History     Smoking Tobacco Use: Former     Smokeless Tobacco Use: Unknown     Passive  Exposure: Not on file   Financial Resource Strain: Not on file   Alcohol Use: Not on file   Transportation Needs: Not on file   Physical Activity: Not on file   Interpersonal Safety: Not on file   Stress: Not on file   Social Connections: Not on file     Mental Health Status:  Mental Health Status: No Current Concerns       Chemical Dependency Status:  Chemical Dependency Status: No Current Concerns           Values/Beliefs:  Spiritual, Cultural Beliefs, Bahai Practices, Values that affect care: yes          Values/Beliefs Comment: Nondenominational    Additional Information:  SW consulted for discharge planning and completed chart review. Per ED provider notes, patient presented with multiple concerns and symptoms including generalized weakness, increased back pain related to back surgeries, urinary frequency, recent falls and inability to care for herself at her current independent living. SW met with patient and her son Robert that was at the bedside. They report that the patient comes from [facility] living facility. The patient does not receive any supports or services there except for ordering lunches here and there. She uses a walker at home. Son states she has been active in the community seeing friends and family, with family providing transportation, and doing very well in her independent housing until very recently. SW explained recommendation from Pt/OT for TCU. The patient was in a TCU in [facility] years ago and had a good experience; she is agreeable to return to this level of care. They asked SW to send referrals to places with good ratings close to [place]. They have a medicare.gov list of facilities.     DORIS Hernandez, LGSW   Social Work   [facility]

## 2023-12-24 NOTE — PLAN OF CARE
Reason for Admission: Fall w IVH, back pain    Cognitive/Mentation: A/Ox 3-4, forgetful  Neuros/CMS: Intact ex intermittent confusion, generalized weakness  VS: stable on RA.   GI: Last BM PTA. Continent.  : In/Continent. Able to void in BR.   Pulmonary: LS clear.  Pain: c/o sharp lower back pain that radiates down RLE, worsens with movement, controlled at rest with tylenol.     Drains/Lines: PIV SL  Skin: blanchable redness on buttocks  Activity: Assist x 2 with GB & jessica steady.  Diet: Regular with thin liquids. Takes pills whole w water & crackers.     Therapies recs: TBD  Discharge: TBD    Aggression Stoplight Tool: green    End of shift summary: stable overnight. Repeat CT planned for this morning.

## 2023-12-25 LAB
ANION GAP SERPL CALCULATED.3IONS-SCNC: 11 MMOL/L (ref 7–15)
BUN SERPL-MCNC: 29.4 MG/DL (ref 8–23)
CALCIUM SERPL-MCNC: 9.6 MG/DL (ref 8.2–9.6)
CHLORIDE SERPL-SCNC: 103 MMOL/L (ref 98–107)
CREAT SERPL-MCNC: 1.14 MG/DL (ref 0.51–0.95)
DEPRECATED HCO3 PLAS-SCNC: 22 MMOL/L (ref 22–29)
EGFRCR SERPLBLD CKD-EPI 2021: 45 ML/MIN/1.73M2
ERYTHROCYTE [DISTWIDTH] IN BLOOD BY AUTOMATED COUNT: 12.3 % (ref 10–15)
GLUCOSE SERPL-MCNC: 118 MG/DL (ref 70–99)
HCT VFR BLD AUTO: 40.6 % (ref 35–47)
HGB BLD-MCNC: 13.3 G/DL (ref 11.7–15.7)
MCH RBC QN AUTO: 30.4 PG (ref 26.5–33)
MCHC RBC AUTO-ENTMCNC: 32.8 G/DL (ref 31.5–36.5)
MCV RBC AUTO: 93 FL (ref 78–100)
PLATELET # BLD AUTO: 202 10E3/UL (ref 150–450)
POTASSIUM SERPL-SCNC: 4.3 MMOL/L (ref 3.4–5.3)
RBC # BLD AUTO: 4.37 10E6/UL (ref 3.8–5.2)
SODIUM SERPL-SCNC: 136 MMOL/L (ref 135–145)
WBC # BLD AUTO: 11.9 10E3/UL (ref 4–11)

## 2023-12-25 PROCEDURE — 85027 COMPLETE CBC AUTOMATED: CPT | Performed by: HOSPITALIST

## 2023-12-25 PROCEDURE — 120N000001 HC R&B MED SURG/OB

## 2023-12-25 PROCEDURE — 36415 COLL VENOUS BLD VENIPUNCTURE: CPT | Performed by: HOSPITALIST

## 2023-12-25 PROCEDURE — 250N000013 HC RX MED GY IP 250 OP 250 PS 637: Performed by: HOSPITALIST

## 2023-12-25 PROCEDURE — 250N000011 HC RX IP 250 OP 636: Performed by: HOSPITALIST

## 2023-12-25 PROCEDURE — 80048 BASIC METABOLIC PNL TOTAL CA: CPT | Performed by: HOSPITALIST

## 2023-12-25 PROCEDURE — 99231 SBSQ HOSP IP/OBS SF/LOW 25: CPT | Performed by: HOSPITALIST

## 2023-12-25 RX ORDER — LIDOCAINE 4 G/G
1 PATCH TOPICAL DAILY PRN
Status: DISCONTINUED | OUTPATIENT
Start: 2023-12-25 | End: 2023-12-28 | Stop reason: HOSPADM

## 2023-12-25 RX ADMIN — LABETALOL HYDROCHLORIDE 10 MG: 5 INJECTION INTRAVENOUS at 15:43

## 2023-12-25 RX ADMIN — ATORVASTATIN CALCIUM 40 MG: 40 TABLET, FILM COATED ORAL at 20:08

## 2023-12-25 RX ADMIN — LIDOCAINE 1 PATCH: 4 PATCH TOPICAL at 08:55

## 2023-12-25 RX ADMIN — DOXAZOSIN 4 MG: 4 TABLET ORAL at 20:08

## 2023-12-25 RX ADMIN — ACETAMINOPHEN 1000 MG: 500 TABLET, FILM COATED ORAL at 01:35

## 2023-12-25 RX ADMIN — HYDRALAZINE HYDROCHLORIDE 10 MG: 20 INJECTION INTRAMUSCULAR; INTRAVENOUS at 00:11

## 2023-12-25 RX ADMIN — ACETAMINOPHEN 1000 MG: 500 TABLET, FILM COATED ORAL at 15:55

## 2023-12-25 RX ADMIN — ACETAMINOPHEN 1000 MG: 500 TABLET, FILM COATED ORAL at 22:51

## 2023-12-25 RX ADMIN — ACETAMINOPHEN 1000 MG: 500 TABLET, FILM COATED ORAL at 08:58

## 2023-12-25 RX ADMIN — METOPROLOL SUCCINATE 50 MG: 50 TABLET, EXTENDED RELEASE ORAL at 08:58

## 2023-12-25 ASSESSMENT — ACTIVITIES OF DAILY LIVING (ADL)
ADLS_ACUITY_SCORE: 29

## 2023-12-25 NOTE — PLAN OF CARE
Reason for Admission: Fall w IVH, back pain    Cognitive/Mentation: A/Ox 3-4, forgetful  Neuros/CMS: Intact ex intermittent confusion, generalized weakness  VS: stable on RA.   GI: Last BM 12/25. Continent/Incontinent  : Continent   Pulmonary: LS clear.  Pain: c/o low back pain, worsens with movement, tylenol admin Q6h with fair effect  Drains/Lines: PIV SL  Skin: WDL  Activity: A1 GB/W  Diet: Regular with thin liquids. Takes pills whole w water & crackers.     Therapies recs: TCU  Discharge: pending placement    Aggression Stoplight Tool: green    End of shift summary: stable overnight.

## 2023-12-25 NOTE — PLAN OF CARE
Reason for Admission: IVH     Cognitive/Mentation: A/Ox 3-4, forgetful  Neuros/CMS: Intact   VS: BP elevated, Labetalol given x1.  Tele: SR.  GI: Last BM 12/25/23. Continent.   : Voiding without difficulty. Continent.  Pulmonary: LS clear throughout.  Pain: reports lower back pain, tylenol given x2. Lidocaine patch in place. Heat packs offered and declined.     Drains/Lines: PIV SL  Skin: scattered bruising  Activity: Assist x 1 with GB/W.  Diet: reg with thin liquids. Takes pills whole with crackers and water.      Therapies recs: TCU  Discharge: placement     Aggression Stoplight Tool: green

## 2023-12-25 NOTE — PROGRESS NOTES
[facility]    Medicine Progress Note - Hospitalist Service    Date of Admission:  12/23/2023    Assessment & Plan   Angely Bryan is a 89 year old female with PMH significant for hypertension, dyslipidemia, CKD stage III, atrial fibrillation (on Eliquis), chronic low back pain with history of spinal stenosis (s/p fusion and epidural steroid injection in past), h/o CVA, h/o left carotid endarterectomy was brought to ED following a fall and some concern for confusion, noted with intraventricular hemorrhage, admitted inpatient 12/23/23.     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   -Neurology following.   -fall and seizure precautions  -blood pressure control.      Worsening chronic low back pain, limited mobility secondary to above, H/o spinal stenosis (with h/o spinal fusion and epidural steroid injection) patient lives independently and family notes that her mobility has been getting very limited which might have contributed to her fall. had MRI lumbar spine done at outside hospital on 12/21/23 which was noted with multilevel spinal canal narrowing greatest at L2-L3, L3-L4 moderate to advanced; also noted multilevel foraminal narrowing greatest at L5-S1 on right, moderate to advanced. she had steroid injection in bilateral SI joint on 12/22 at outside hospital  Plan:   -neurosurgery following  -lidocaine patch  -PT/OT; social work  -fall precautions     Hypertension, Dyslipidemia, atrial fibrillation (on Eliquis)  -holding anticoagulation   -hold PTA aspirin  -hydralazine/labetalol PRN   -PTA Lipitor,  doxazosin and Toprol-XL     Likely CKD stage III baseline creatinine noted around 1.3-1.4  -monitor      DVT prophylaxis: Defer to neuro; holding PTA Eliquis as above              Diet: Regular Diet Adult  Room Service      Tuttle Catheter: Not present  Lines: None     Cardiac Monitoring: None  Code Status: No CPR- Do NOT Intubate      Clinically Significant Risk Factors                  # Hypertension: Noted on problem list            # Financial/Environmental Concerns: none         Disposition Plan      Expected Discharge Date: 12/26/2023                    Preet Wilson DO  Hospitalist Service  [facility]  Securely message with [employer] (more info)  Text page via [employer] Paging/Directory   ______________________________________________________________________    Interval History   Patient reporting back discomfort today, discussed lidocaine patch, updated on plan of care, verbalized understanding.    Physical Exam   Vital Signs: Temp: 97.9  F (36.6  C) Temp src: Oral BP: (!) 147/90 Pulse: 83   Resp: 16 SpO2: 93 % O2 Device: None (Room air)    Weight: 129 lbs 10.09 oz        GENERAL: Alert. Conversational.  Lying in bed, appears uncomfortable due to back.  HEENT: Normocephalic. Nares normal.   LUNGS: Clear to auscultation. No dyspnea at rest.   HEART: Irregular rate. Extremities perfused.   ABDOMEN: Soft, nontender, and nondistended. Positive bowel sounds.   EXTREMITIES: No LE edema noted.   NEUROLOGIC: Moves extremities x4 on command.    Medical Decision Making       30 MINUTES SPENT BY ME on the date of service doing chart review, history, exam, documentation & further activities per the note.      Data   Imaging results reviewed over the past 24 hrs:   No results found for this or any previous visit (from the past 24 hour(s)).

## 2023-12-26 ENCOUNTER — HOSPITAL ENCOUNTER (INPATIENT)
Dept: NEUROLOGY | Facility: CLINIC | Age: 88
Discharge: HOME | DRG: 082 | End: 2023-12-26
Attending: NURSE PRACTITIONER
Payer: MEDICARE

## 2023-12-26 ENCOUNTER — APPOINTMENT (OUTPATIENT)
Dept: CT IMAGING | Facility: CLINIC | Age: 88
DRG: 082 | End: 2023-12-26
Attending: NURSE PRACTITIONER
Payer: MEDICARE

## 2023-12-26 LAB
ANION GAP SERPL CALCULATED.3IONS-SCNC: 9 MMOL/L (ref 7–15)
BASOPHILS # BLD AUTO: 0 10E3/UL (ref 0–0.2)
BASOPHILS NFR BLD AUTO: 0 %
BUN SERPL-MCNC: 30.1 MG/DL (ref 8–23)
CALCIUM SERPL-MCNC: 9.4 MG/DL (ref 8.2–9.6)
CHLORIDE SERPL-SCNC: 104 MMOL/L (ref 98–107)
CREAT SERPL-MCNC: 1.28 MG/DL (ref 0.51–0.95)
DEPRECATED HCO3 PLAS-SCNC: 23 MMOL/L (ref 22–29)
EGFRCR SERPLBLD CKD-EPI 2021: 39 ML/MIN/1.73M2
EOSINOPHIL # BLD AUTO: 0.5 10E3/UL (ref 0–0.7)
EOSINOPHIL NFR BLD AUTO: 6 %
ERYTHROCYTE [DISTWIDTH] IN BLOOD BY AUTOMATED COUNT: 12.3 % (ref 10–15)
GLUCOSE BLDC GLUCOMTR-MCNC: 191 MG/DL (ref 70–99)
GLUCOSE SERPL-MCNC: 225 MG/DL (ref 70–99)
HCT VFR BLD AUTO: 38 % (ref 35–47)
HGB BLD-MCNC: 12.3 G/DL (ref 11.7–15.7)
IMM GRANULOCYTES # BLD: 0.1 10E3/UL
IMM GRANULOCYTES NFR BLD: 1 %
LACTATE SERPL-SCNC: 2.2 MMOL/L (ref 0.7–2)
LYMPHOCYTES # BLD AUTO: 0.7 10E3/UL (ref 0.8–5.3)
LYMPHOCYTES NFR BLD AUTO: 8 %
MCH RBC QN AUTO: 30.6 PG (ref 26.5–33)
MCHC RBC AUTO-ENTMCNC: 32.4 G/DL (ref 31.5–36.5)
MCV RBC AUTO: 95 FL (ref 78–100)
MONOCYTES # BLD AUTO: 0.9 10E3/UL (ref 0–1.3)
MONOCYTES NFR BLD AUTO: 11 %
NEUTROPHILS # BLD AUTO: 6 10E3/UL (ref 1.6–8.3)
NEUTROPHILS NFR BLD AUTO: 74 %
NRBC # BLD AUTO: 0 10E3/UL
NRBC BLD AUTO-RTO: 0 /100
PLATELET # BLD AUTO: 170 10E3/UL (ref 150–450)
POTASSIUM SERPL-SCNC: 4 MMOL/L (ref 3.4–5.3)
RBC # BLD AUTO: 4.02 10E6/UL (ref 3.8–5.2)
SODIUM SERPL-SCNC: 136 MMOL/L (ref 135–145)
WBC # BLD AUTO: 8.1 10E3/UL (ref 4–11)

## 2023-12-26 PROCEDURE — 80048 BASIC METABOLIC PNL TOTAL CA: CPT | Performed by: HOSPITALIST

## 2023-12-26 PROCEDURE — 250N000013 HC RX MED GY IP 250 OP 250 PS 637: Performed by: HOSPITALIST

## 2023-12-26 PROCEDURE — 83605 ASSAY OF LACTIC ACID: CPT | Performed by: NURSE PRACTITIONER

## 2023-12-26 PROCEDURE — 95816 EEG AWAKE AND DROWSY: CPT

## 2023-12-26 PROCEDURE — 120N000001 HC R&B MED SURG/OB

## 2023-12-26 PROCEDURE — 250N000011 HC RX IP 250 OP 636: Performed by: HOSPITALIST

## 2023-12-26 PROCEDURE — 85025 COMPLETE CBC W/AUTO DIFF WBC: CPT | Performed by: NURSE PRACTITIONER

## 2023-12-26 PROCEDURE — 70450 CT HEAD/BRAIN W/O DYE: CPT | Mod: MG

## 2023-12-26 PROCEDURE — 99291 CRITICAL CARE FIRST HOUR: CPT | Performed by: NURSE PRACTITIONER

## 2023-12-26 PROCEDURE — 99231 SBSQ HOSP IP/OBS SF/LOW 25: CPT | Performed by: HOSPITALIST

## 2023-12-26 PROCEDURE — 36415 COLL VENOUS BLD VENIPUNCTURE: CPT | Performed by: NURSE PRACTITIONER

## 2023-12-26 RX ORDER — OLANZAPINE 5 MG/1
5 TABLET, ORALLY DISINTEGRATING ORAL
Status: DISCONTINUED | OUTPATIENT
Start: 2023-12-26 | End: 2023-12-28 | Stop reason: HOSPADM

## 2023-12-26 RX ADMIN — HYDRALAZINE HYDROCHLORIDE 10 MG: 20 INJECTION INTRAMUSCULAR; INTRAVENOUS at 17:42

## 2023-12-26 RX ADMIN — METOPROLOL SUCCINATE 50 MG: 50 TABLET, EXTENDED RELEASE ORAL at 07:00

## 2023-12-26 RX ADMIN — ACETAMINOPHEN 1000 MG: 500 TABLET, FILM COATED ORAL at 07:00

## 2023-12-26 RX ADMIN — DOXAZOSIN 4 MG: 4 TABLET ORAL at 20:13

## 2023-12-26 RX ADMIN — ACETAMINOPHEN 1000 MG: 500 TABLET, FILM COATED ORAL at 21:23

## 2023-12-26 RX ADMIN — LIDOCAINE 1 PATCH: 4 PATCH TOPICAL at 07:01

## 2023-12-26 RX ADMIN — ATORVASTATIN CALCIUM 40 MG: 40 TABLET, FILM COATED ORAL at 20:13

## 2023-12-26 RX ADMIN — LABETALOL HYDROCHLORIDE 10 MG: 5 INJECTION INTRAVENOUS at 17:04

## 2023-12-26 ASSESSMENT — ACTIVITIES OF DAILY LIVING (ADL)
ADLS_ACUITY_SCORE: 30
ADLS_ACUITY_SCORE: 30
ADLS_ACUITY_SCORE: 33
ADLS_ACUITY_SCORE: 30
ADLS_ACUITY_SCORE: 33
ADLS_ACUITY_SCORE: 30
ADLS_ACUITY_SCORE: 33
ADLS_ACUITY_SCORE: 30

## 2023-12-26 NOTE — PROGRESS NOTES
Care Management Follow Up    Length of Stay (days): 3    Expected Discharge Date: 12/26/2023     Concerns to be Addressed:  TCU facility selection  Patient plan of care discussed at interdisciplinary rounds: Yes    Anticipated Discharge Disposition: Skilled Nursing Facility, Transitional Care    Referrals Placed by CM/SW:  TCU  Private pay costs discussed: Not applicable    Additional Information:  Patient accepted to both [facility] and [facility]. SW inquired to hospitalist if patient is medically ready, she is not ready to go today. JENNIFER met with son Robert and determined JANET Dyson is a good fit in shared room. Will hope for discharge tomorrow, Robert states he could likely transport but wants to plan later on. JENNIFER notified TCUs.    Lianna Beverly, DORIS, LGSW   Social Work   [facility]

## 2023-12-26 NOTE — CODE/RAPID RESPONSE
"[facility]    RRT Note  12/26/2023   Time Called: 0942    Code Status: No CPR- Do NOT Intubate    I was called to evaluate Angely Bryan, who is a 89 year old female who was admitted on 12/23/2023 for fall, found to hve traumatic IVH, NPH. PMH includes chronic low back pain, HTN, afib on eliquis (on hold), CKD, dyslipidemia.    Pt sitting upright eating breakfast when she called for assistance to use the bathroom. Prior to standing, she noted to nursing she felt dizzy, and was found to be bradycardiac with unidentified rhythm (rate ~30s-40s), substantial drop in /124-->105/55, prompting RRT.    Assessment & Plan     Altered Mental Status w/ Hemodynamic Instability   Pre Syncope, unclear etiology  Concern for Seizure Activity: on my arrival, pt is lethargic, slow to respond. Subjectively, she endorses small \"black spots\" in her vision which is new, denies headache but is quite nauseated. Skin warm/dry, but pale. Per nursing, prior to my arrival, she had a left gaze preference x ~5 min, no obvious post ictal period, but certainly has altered mentation on my arrival. Glucose 191. With supine positioning, BP improved to 124/79, neurologically intact but affect flat compared to baseline. Of note, pt had PTA metoprolol for HTN at ~7am, no recent PRNs. No urinary or respiratory symptoms, afebrile. New meds - lidocaine patch.     Recent imaging - Repeat CT head 12/24 showing interval resolution of IVH.    DDx considered but not limited to: orthostatic hypotension in the setting of hypovolemia, vasovagal episode, seizure, progression of intraventricular hemorrhage; cardiac arrhythmia, infectious etiology    INTERVENTIONS:  - CT head w/o contrast   - labs: BMP, CBC, lactic acid STAT  - LR bolus 250 ml x 1 - fluid responsive   - orthostatic vitals after bolus infused, CT completed   - telemetry x 24 hours   - EEG - question if pt seized at home causing fall?      Last 24H PRN:     acetaminophen " (TYLENOL) tablet 1,000 mg, 1,000 mg at 12/26/23 0700    labetalol (NORMODYNE/TRANDATE) injection 10 mg, 10 mg at 12/25/23 1543    sodium chloride (PF) 0.9% PF flush 3 mL, 3 mL at 12/25/23 1550      At the conclusion of this RRT patient was hemodynamically stable and will remain on current unit. I have updated the patient's son at the bedside regarding the patient's change in condition, planned work up, and will return w/ results update when available.     Discussed with and defer further cares to Dr. Joselito Wilson, Hospitalist      Silvia Crenshaw [facility]  Securely message with the [employer]oc[employer] Web Console (learn more here)  Text page via [employer] Paging/Directory        Physical Exam   Vital Signs with Ranges:  Temp:  [97.9  F (36.6  C)-98  F (36.7  C)] 98  F (36.7  C)  Pulse:  [65-78] 65  Resp:  [16-18] 18  BP: (129-180)/() 179/124  SpO2:  [94 %] 94 %  I/O last 3 completed shifts:  In: 300 [P.O.:300]  Out: -     Orthostatics pending         Physical Exam  Vitals and nursing note reviewed.   Constitutional:       General: She is in acute distress.      Appearance: She is ill-appearing. She is not toxic-appearing or diaphoretic.   Cardiovascular:      Rate and Rhythm: Regular rhythm. Bradycardia present.      Heart sounds: Normal heart sounds.   Pulmonary:      Effort: Pulmonary effort is normal. No tachypnea.      Breath sounds: Normal breath sounds and air entry.   Abdominal:      Palpations: Abdomen is soft.      Tenderness: There is no abdominal tenderness.   Musculoskeletal:      Right lower leg: No edema.      Left lower leg: No edema.   Skin:     General: Skin is warm.      Coloration: Skin is pale.   Neurological:      GCS: GCS eye subscore is 4. GCS verbal subscore is 5. GCS motor subscore is 6.      Cranial Nerves: No dysarthria.      Comments: Slow to respond initially, but symptoms resolved and pt returned to baseline    Psychiatric:         Mood and Affect: Affect is  "flat.          Data     IMAGING: (X-ray/CT/MRI)   No results found for this or any previous visit (from the past 24 hour(s)).    CBC with Diff:  Recent Labs   Lab Test 12/25/23 0921   WBC 11.9*   HGB 13.3   MCV 93         No results found for: \"RETICABSCT\"  No results found for: \"RETP\"    Comprehensive Metabolic Panel:  Recent Labs   Lab 12/25/23  0921 12/24/23  0816 12/23/23  0955      < > 135   POTASSIUM 4.3   < > 5.3   CHLORIDE 103   < > 101   CO2 22   < > 23   ANIONGAP 11   < > 11   *   < > 122*   BUN 29.4*   < > 29.9*   CR 1.14*   < > 1.17*   GFRESTIMATED 45*   < > 44*   CHANDNI 9.6   < > 9.9*   PROTTOTAL  --   --  7.9   ALBUMIN  --   --  4.1   BILITOTAL  --   --  0.5   ALKPHOS  --   --  70   AST  --   --  31   ALT  --   --  13    < > = values in this interval not displayed.         Time Spent on this Encounter      CRITICAL CARE TIME  I spent 34 minutes of critical care time on the unit/floor managing the care of Angely Bryan. Upon evaluation, this patient had a high probability of imminent or life-threatening deterioration due to altered mentation with hemodynamic instability which required my direct attention, intervention, and personal management. 100% of my time was spent at the bedside counseling the patient and/or coordinating care regarding services listed in this note.    "

## 2023-12-26 NOTE — PLAN OF CARE
Reason for Admission: Intraventricular hemorrhage from a fall    Cognitive/Mentation: A/Ox 4/ slight forgetful  Neuros/CMS: Intact ex general weakness  VS: lower b/p.   Tele: NSR.  GI: . Continent.  : Continent.  Pulmonary: LS clear.  Pain: denies.     Drains/Lines: PIV  Skin: intact  Activity: Assist x 1 with GBW.  Diet: regular with thin liquids. Takes pills whole.     Therapies recs: TCU  Discharge: pending work up    Aggression Stoplight Tool: green    End of shift summary: Patient had RRT this am after episode of dizzyness and some nausea and then became only responsive to name with right gaze lasting 5 minutes. Patient continues to have lower b/p, back to full alertness after fluid bolus. Stat CT was unchanged from previous, patient still needs EEG.

## 2023-12-26 NOTE — PROGRESS NOTES
Patient sitting up eating breakfast and suddenly became dizzy and nauseated. Writer then layed patient down and she became less responsive with right gaze for about 5 minutes , still responding to here name. Once RRT team arrived patient was a little more responsive. Will get labs and CT per house MD order

## 2023-12-26 NOTE — PLAN OF CARE
Reason for Admission: IVH     Cognitive/Mentation: A/Ox 3-4, forgetful  Neuros/CMS: Intact ex generally weak & intermittent confusion  VS: VSS, SBP <150  GI: Last BM 12/25/23. Continent.   : Voiding without difficulty. Continent.  Pain: Low back pain, tylenol admin   Drains/Lines: PIV SL  Skin: scattered bruising  Activity: Assist x 1 with GB/W.  Diet: Reg with thin liquids. Takes pills whole with crackers and water.      Therapies recs: TCU  Discharge: placement     Aggression Stoplight Tool: green

## 2023-12-26 NOTE — PROGRESS NOTES
Sepsis Evaluation     I was called to see Angely Bryan due to a change in vital signs. She is not known to have an infection.     PHYSICAL EXAM  Vital Signs:  Temp: 98  F (36.7  C) Temp src: Oral BP: 129/59 Pulse: 64   Resp: 18 SpO2: 94 % O2 Device: None (Room air)      General: acutely ill appearing  Mental Status: altered level of consciousness based on lethargy .     Remainder of physical exam is significant for    Physical Exam  Vitals and nursing note reviewed.   Constitutional:       General: She is in acute distress.      Appearance: She is ill-appearing. She is not toxic-appearing or diaphoretic.   Cardiovascular:      Rate and Rhythm: Regular rhythm. Bradycardia present.      Heart sounds: Normal heart sounds.   Pulmonary:      Effort: Pulmonary effort is normal. No tachypnea.      Breath sounds: Normal breath sounds and air entry.   Abdominal:      Palpations: Abdomen is soft.      Tenderness: There is no abdominal tenderness.   Musculoskeletal:      Right lower leg: No edema.      Left lower leg: No edema.   Skin:     General: Skin is warm.      Coloration: Skin is pale.   Neurological:      GCS: GCS eye subscore is 4. GCS verbal subscore is 5. GCS motor subscore is 6.      Cranial Nerves: No dysarthria.      Comments: Slow to respond initially, but symptoms resolved and pt returned to baseline    Psychiatric:         Mood and Affect: Affect is flat.       DATA  Lactic Acid   Date Value Ref Range Status   12/26/2023 2.2 (H) 0.7 - 2.0 mmol/L Final       ASSESSMENT AND PLAN  NO EVIDENCE OF SEPSIS at this time.  Vital sign, physical exam, and lab findings are due to hypovolemia vs seizure.    Disposition: The patient will remain on the current unit. We will continue to monitor this patient closely..  Silvia Crenshaw, HEIDY CNP  12/26/23, 10:43 AM    Sepsis Criteria   Sepsis: The body's generalized inflammatory state as a response to an infection. Sepsis Predictive Model includes >80 variable to alert to  potential sepsis.  Severe Sepsis: Sepsis plus one or more variables of acute organ dysfunction (Note: lactic acid >2 or acute encephalopathy each qualify as organ dysfunction)  Septic Shock: Sepsis AND hypotension despite adequate volume resuscitation with crystalloid or lactic acid >=4  Note: HYPOTENSION is defined as 2 BP readings measured 3 hrs apart that have a SBP <90, MAP <65, or decrease >40 mmHg, occurring 6 hrs before or after t-zero

## 2023-12-26 NOTE — PROGRESS NOTES
[facility]    Medicine Progress Note - Hospitalist Service    Date of Admission:  12/23/2023    Assessment & Plan   Angely Bryan is a 89 year old female with PMH significant for hypertension, dyslipidemia, CKD stage III, atrial fibrillation (on Eliquis), chronic low back pain with history of spinal stenosis (s/p fusion and epidural steroid injection in past), h/o CVA, h/o left carotid endarterectomy was brought to ED following a fall and some concern for confusion, noted with intraventricular hemorrhage, admitted inpatient 12/23/23.     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   -Neurology following.   -fall and seizure precautions  -EEG  -blood pressure control.      Worsening chronic low back pain, limited mobility secondary to above, H/o spinal stenosis (with h/o spinal fusion and epidural steroid injection) patient lives independently and family notes that her mobility has been getting very limited which might have contributed to her fall. had MRI lumbar spine done at outside hospital on 12/21/23 which was noted with multilevel spinal canal narrowing greatest at L2-L3, L3-L4 moderate to advanced; also noted multilevel foraminal narrowing greatest at L5-S1 on right, moderate to advanced. she had steroid injection in bilateral SI joint on 12/22 at outside hospital  Plan:   -neurosurgery following  -lidocaine patch  -PT/OT; social work  -fall precautions     Hypertension, Dyslipidemia, atrial fibrillation (on Eliquis)  -holding anticoagulation   -hold PTA aspirin  -hydralazine/labetalol PRN   -PTA  Lipitor, doxazosin and Toprol-XL     Likely CKD stage III baseline creatinine noted around 1.3-1.4  -monitor      DVT prophylaxis: Defer to neuro; holding PTA Eliquis as above    Disposition: Await TCU.           Diet: Regular Diet Adult  Room Service      Tuttle Catheter: Not present  Lines: None     Cardiac Monitoring: None  Code Status: No CPR- Do NOT Intubate      Clinically Significant Risk Factors                  # Hypertension: Noted on problem list            # Financial/Environmental Concerns: none         Disposition Plan      Expected Discharge Date: 12/26/2023                    Preet Wilson DO  Hospitalist Service  [facility]  Securely message with [employer] (more info)  Text page via [employer] Paging/Directory   ______________________________________________________________________    Interval History   Patient updated on plan of care with family member at bedside, verbalized understanding.     Physical Exam   Vital Signs: Temp: 98  F (36.7  C) Temp src: Oral BP: (!) 146/70 Pulse: 73   Resp: 16 SpO2: 94 % O2 Device: None (Room air)    Weight: 129 lbs 10.09 oz      GENERAL: Alert. Conversational. Pleasant.   HEENT: Normocephalic. Nares normal.   LUNGS: Clear to auscultation. No dyspnea at rest.   HEART: Irregular rate. Extremities perfused.   ABDOMEN: Soft, nontender, and nondistended. Positive bowel sounds.   EXTREMITIES: No LE edema noted.   NEUROLOGIC: Moves extremities x4 on command.    Medical Decision Making       29 MINUTES SPENT BY ME on the date of service doing chart review, history, exam, documentation & further activities per the note.      Data   Imaging results reviewed over the past 24 hrs:   No results found for this or any previous visit (from the past 24 hour(s)).

## 2023-12-27 ENCOUNTER — APPOINTMENT (OUTPATIENT)
Dept: PHYSICAL THERAPY | Facility: CLINIC | Age: 88
DRG: 082 | End: 2023-12-27
Attending: HOSPITALIST
Payer: MEDICARE

## 2023-12-27 ENCOUNTER — APPOINTMENT (OUTPATIENT)
Dept: GENERAL RADIOLOGY | Facility: CLINIC | Age: 88
DRG: 082 | End: 2023-12-27
Attending: HOSPITALIST
Payer: MEDICARE

## 2023-12-27 PROCEDURE — 72100 X-RAY EXAM L-S SPINE 2/3 VWS: CPT

## 2023-12-27 PROCEDURE — 120N000001 HC R&B MED SURG/OB

## 2023-12-27 PROCEDURE — 250N000011 HC RX IP 250 OP 636: Performed by: HOSPITALIST

## 2023-12-27 PROCEDURE — 250N000013 HC RX MED GY IP 250 OP 250 PS 637: Performed by: HOSPITALIST

## 2023-12-27 PROCEDURE — 99231 SBSQ HOSP IP/OBS SF/LOW 25: CPT | Performed by: HOSPITALIST

## 2023-12-27 PROCEDURE — 97530 THERAPEUTIC ACTIVITIES: CPT | Mod: GP

## 2023-12-27 RX ORDER — AMLODIPINE BESYLATE 5 MG/1
5 TABLET ORAL DAILY
Status: DISCONTINUED | OUTPATIENT
Start: 2023-12-27 | End: 2023-12-28

## 2023-12-27 RX ADMIN — ATORVASTATIN CALCIUM 40 MG: 40 TABLET, FILM COATED ORAL at 20:20

## 2023-12-27 RX ADMIN — ACETAMINOPHEN 1000 MG: 500 TABLET, FILM COATED ORAL at 21:01

## 2023-12-27 RX ADMIN — DOXAZOSIN 4 MG: 4 TABLET ORAL at 20:20

## 2023-12-27 RX ADMIN — ACETAMINOPHEN 1000 MG: 500 TABLET, FILM COATED ORAL at 14:55

## 2023-12-27 RX ADMIN — AMLODIPINE BESYLATE 5 MG: 5 TABLET ORAL at 09:53

## 2023-12-27 RX ADMIN — LIDOCAINE 1 PATCH: 4 PATCH TOPICAL at 03:08

## 2023-12-27 RX ADMIN — LABETALOL HYDROCHLORIDE 10 MG: 5 INJECTION INTRAVENOUS at 22:23

## 2023-12-27 RX ADMIN — ACETAMINOPHEN 1000 MG: 500 TABLET, FILM COATED ORAL at 07:40

## 2023-12-27 RX ADMIN — METOPROLOL SUCCINATE 50 MG: 50 TABLET, EXTENDED RELEASE ORAL at 07:41

## 2023-12-27 RX ADMIN — HYDRALAZINE HYDROCHLORIDE 10 MG: 20 INJECTION INTRAMUSCULAR; INTRAVENOUS at 07:37

## 2023-12-27 ASSESSMENT — ACTIVITIES OF DAILY LIVING (ADL)
ADLS_ACUITY_SCORE: 33
ADLS_ACUITY_SCORE: 35
ADLS_ACUITY_SCORE: 33
ADLS_ACUITY_SCORE: 33
ADLS_ACUITY_SCORE: 35
ADLS_ACUITY_SCORE: 33

## 2023-12-27 NOTE — PROGRESS NOTES
[facility]    Medicine Progress Note - Hospitalist Service    Date of Admission:  12/23/2023    Assessment & Plan   Angely Bryan is a 89 year old female with PMH significant for hypertension, dyslipidemia, CKD stage III, atrial fibrillation (on Eliquis), chronic low back pain with history of spinal stenosis (s/p fusion and epidural steroid injection in past), h/o CVA, h/o left carotid endarterectomy was brought to ED following a fall and some concern for confusion, noted with intraventricular hemorrhage, admitted inpatient 12/23/23.     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   -Neurology following.   -fall and seizure precautions  -EEG negative for seizure.   -blood pressure control.   -amlodipine.      Worsening chronic low back pain, limited mobility secondary to above, H/o spinal stenosis (with h/o spinal fusion and epidural steroid injection) patient lives independently and family notes that her mobility has been getting very limited which might have contributed to her fall. had MRI lumbar spine done at outside hospital on 12/21/23 which was noted with multilevel spinal canal narrowing greatest at L2-L3, L3-L4 moderate to advanced; also noted multilevel foraminal narrowing greatest at L5-S1 on right, moderate to advanced. she had steroid injection in bilateral SI joint on 12/22 at outside hospital  Plan:   -neurosurgery following  -lidocaine patch  -PT/OT; social work  -fall precautions  -XR lumbar spine.      Hypertension, Dyslipidemia, atrial fibrillation (on Eliquis)  -holding anticoagulation    -hold PTA aspirin  -hydralazine/labetalol PRN   -PTA Lipitor, doxazosin and Toprol-XL  -amlodipine.      Likely CKD stage III baseline creatinine noted around 1.3-1.4  -monitor      DVT prophylaxis: Defer to neuro; holding PTA Eliquis as above    Disposition: plan for discharge on 12/28.           Diet: Regular Diet Adult  Room Service      Tuttle Catheter: Not present  Lines: None     Cardiac Monitoring: ACTIVE order. Indication: Syncope- high cardiac risk (48 hours)  Code Status: No CPR- Do NOT Intubate      Clinically Significant Risk Factors                  # Hypertension: Noted on problem list            # Financial/Environmental Concerns: none         Disposition Plan      Expected Discharge Date: 12/27/2023                    Preet Wilson DO  Hospitalist Service  [facility]  Securely message with [employer] (more info)  Text page via [employer] Paging/Directory   ______________________________________________________________________    Interval History   Discussed plan of care extensively with patient and family at bedside.    Physical Exam   Vital Signs: Temp: 100.3  F (37.9  C) Temp src: Oral BP: (!) 186/140 Pulse: 105   Resp: 16 SpO2: 93 % O2 Device: None (Room air)    Weight: 129 lbs 10.09 oz        GENERAL: Alert. Conversational. Pleasant.  Resting comfortably in bed.  HEENT: Normocephalic. Nares normal.   LUNGS: Clear to auscultation. No dyspnea at rest.   HEART: Irregular rate. Extremities perfused.   ABDOMEN: Soft, nontender, and nondistended. Positive bowel sounds.   EXTREMITIES: No LE edema noted.   NEUROLOGIC: Moves extremities x4 on command.    Medical Decision Making       29 MINUTES SPENT BY ME on the date of service doing chart review, history, exam, documentation & further activities per the note.      Data   Imaging results reviewed over the past 24 hrs:   Recent Results (from the past 24 hour(s))   CT Head w/o Contrast    Narrative    EXAM: CT HEAD W/O CONTRAST   12/26/2023 10:55 AM     HISTORY: bradycardiac episode, nausea, dec mentation       COMPARISON: 12/24/2023, MRI 12/23/2023    TECHNIQUE: Using multidetector thin collimation helical acquisition  technique, axial, coronal and sagittal CT images from the skull base  to the vertex were obtained without intravenous contrast.   (topogram) image(s) also obtained and reviewed. Dose reduction  techniques were used.    FINDINGS:  No acute intracranial hemorrhage, mass effect, or midline shift.  Subcortical, periventricular areas of white matter hypoattenuation;  advanced parenchymal volume loss and compensatory ventricular  dilatation, the latter which is disproportionate to the degree of  parenchymal volume loss. No CT findings of acute infarct.  Preserved  subarachnoid spaces.    Atraumatic calvarium. No substantial paranasal sinus mucosal disease.  Clear mastoid air cells. Nonfocal orbits.       Impression    IMPRESSION:   1. No CT evidence for acute intracranial process.  2. 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 Ventricles remain disproportionately  dilated to the degree of volume loss.    MIKE HUSAIN          SYSTEM ID:  [id number]

## 2023-12-27 NOTE — PROGRESS NOTES
Slept well overnight. A&Ox4, slightly confused as to where she was when being woken up, but was able to quickly be re-orientated. Lidocaine patch applied to back for pain control. Ambulated to bathroom x1 assist of 1 with walker. Voiding adequately. Tolerating regular diet. VSS on room air. Neuros intact. Plan to possibly discharge to [facility]CU today.

## 2023-12-27 NOTE — PROGRESS NOTES
Care Management Follow Up    Length of Stay (days): 4    Expected Discharge Date: 12/28/2023     Concerns to be Addressed:  discharge planning     Patient plan of care discussed at interdisciplinary rounds: Yes    Anticipated Discharge Disposition: Skilled Nursing Facility, Transitional Care  Anticipated Discharge Services:    Anticipated Discharge DME:      Referrals Placed by CM/SW:  TCU  Private pay costs discussed: Not applicable    Additional Information:  SW spoke to son Robert to organize discharge for tomorrow. He states it's best to have safe transport, he is aware and agreeable to cost of $90 + $5 a mile. Hospitalist requested early discharge, [facility] wheelchair scheduled for pickup tomorrow 12/28 between [id number] to go to [facility]CU. Orders needed by 0800. Updated TCU on timing for tomorrow. PAS is needed.     DORIS Hernandez, LGSW   Social Work   [facility]

## 2023-12-27 NOTE — PROVIDER NOTIFICATION
"Attempted orthostatic BP's, done in two parts:    Lie-Sit successful, but BP cuff could not read properly while standing (applied on R upper arm) & pt unable to tolerate standing for re-attempt   12/27/23 0953   Vitals   BP (!) 163/70   Lying Orthostatic BP   Lying Orthostatic /70   Lying Orthostatic Pulse 65 bpm   Sitting Orthostatic BP   Sitting Orthostatic /67   Sitting Orthostatic Pulse 80 bpm     Re-checked BP on R lower arm once safely on commode (note, voided & had gas but no BM);   12/27/23 1007   Sitting Orthostatic BP   Sitting Orthostatic /69   Sitting Orthostatic Pulse 81 bpm     While sitting on commode, pt c/o \"I feel like I am going to tip over\" (and tried to \"catch\" herself from tipping although she was seated safely and securely with RN providing support) when asked, pt agreed it felt like lightheadedness.  Sitting BP as below.  We were able to stand and check BP standing, on R lower arm, as below:    12/27/23 1015   Sitting Orthostatic BP   Sitting Orthostatic /66  (while sitting on commode c/o lightheaded)   Sitting Orthostatic Pulse 78 bpm   Standing Orthostatic BP   Standing Orthostatic /73   Standing Orthostatic Pulse 93 bpm     Additionally c/o nausea with position changes.    Updated Hospitalist re: note/results above.  "

## 2023-12-27 NOTE — PLAN OF CARE
Pt here with IVH. A&Ox3 d/t time. Neuros intact, gen weakness and intermittent dizziness. VSS; SBP goal <150. Tele NSR. Reg diet, thin liquids. Takes pills whole. Up with Ax1 GBW. Denies pain. Q2H turns. Pt scoring green on the Aggression Stop Light Tool. Plan is likley to discharge tomorrow, wheel chair transport set up to go to [facility] between [id number].

## 2023-12-27 NOTE — PLAN OF CARE
Goal Outcome Evaluation:      Plan of Care Reviewed With: patient, child (sons)    Overall Patient Progress: no changeOverall Patient Progress: no change    Outcome Evaluation: continues to have dizziness when up (see orthostatic blood pressures) and pain in sacrum that impair mobility    Mayur Henley, RN on 12/26/2023 at 1:27 AM  Reason for Admission: IVH     Cognitive/Mentation: alert/oriented x 3-4, forgetful  Neuros/CMS: generalized weakness, can be confused upon waking   VS: orthostatic blood pressures with 20+ drop--MD notified, PO fluids encouraged; goal SBP <150 (see chart)  GI: passing gas, no BM (last was 12/25/23); continent  : up to bedside commode Ax1, voiding, continent  Pain: low back/sacral pain, tylenol & cold pack provided, xray done   Drains/Lines: PIV SL  Skin: small forearm bruising from labs etc  Activity: assist x 1 with gait belt & walker  Diet: Regular with thin liquids. Takes pills whole with crackers and water.      Therapies recs / discharge plan: TCU anticipating 12/28, SW arranging wheelchair transportation      Aggression Stoplight Tool: green

## 2023-12-27 NOTE — PLAN OF CARE
Neuro: Intact ex slight dizziness with position changes and reports seeing spots intermittently since she fell  Cardio: NSR. PRN administered for HTN  Resp: LS dim RA  GI: Tolerating diet without issue  : Voids without issue  Pain: PRN Tylenol for back pain  Activity: A1 GBW  Skin: Intact

## 2023-12-28 VITALS
OXYGEN SATURATION: 92 % | WEIGHT: 129.63 LBS | SYSTOLIC BLOOD PRESSURE: 150 MMHG | RESPIRATION RATE: 18 BRPM | HEART RATE: 72 BPM | HEIGHT: 63 IN | TEMPERATURE: 98.4 F | BODY MASS INDEX: 22.97 KG/M2 | DIASTOLIC BLOOD PRESSURE: 63 MMHG

## 2023-12-28 PROCEDURE — 250N000013 HC RX MED GY IP 250 OP 250 PS 637: Performed by: HOSPITALIST

## 2023-12-28 PROCEDURE — 99238 HOSP IP/OBS DSCHRG MGMT 30/<: CPT | Performed by: HOSPITALIST

## 2023-12-28 RX ORDER — AMLODIPINE BESYLATE 10 MG/1
10 TABLET ORAL DAILY
Status: DISCONTINUED | OUTPATIENT
Start: 2023-12-29 | End: 2023-12-28 | Stop reason: HOSPADM

## 2023-12-28 RX ORDER — AMLODIPINE BESYLATE 10 MG/1
10 TABLET ORAL DAILY
Qty: 30 TABLET | Refills: 0 | Status: SHIPPED | OUTPATIENT
Start: 2023-12-28

## 2023-12-28 RX ORDER — LIDOCAINE 4 G/G
1 PATCH TOPICAL DAILY PRN
Qty: 30 PATCH | Refills: 0 | Status: SHIPPED | OUTPATIENT
Start: 2023-12-28

## 2023-12-28 RX ORDER — AMLODIPINE BESYLATE 5 MG/1
5 TABLET ORAL DAILY
Qty: 30 TABLET | Refills: 0 | Status: SHIPPED | OUTPATIENT
Start: 2023-12-28 | End: 2023-12-28

## 2023-12-28 RX ORDER — OLANZAPINE 5 MG/1
5 TABLET, ORALLY DISINTEGRATING ORAL
Qty: 30 TABLET | Refills: 0 | Status: SHIPPED | OUTPATIENT
Start: 2023-12-28

## 2023-12-28 RX ORDER — AMLODIPINE BESYLATE 10 MG/1
10 TABLET ORAL DAILY
Status: DISCONTINUED | OUTPATIENT
Start: 2023-12-28 | End: 2023-12-28

## 2023-12-28 RX ORDER — AMLODIPINE BESYLATE 10 MG/1
10 TABLET ORAL DAILY
Status: COMPLETED | OUTPATIENT
Start: 2023-12-28 | End: 2023-12-28

## 2023-12-28 RX ADMIN — METOPROLOL SUCCINATE 50 MG: 50 TABLET, EXTENDED RELEASE ORAL at 08:12

## 2023-12-28 RX ADMIN — LIDOCAINE 1 PATCH: 4 PATCH TOPICAL at 08:12

## 2023-12-28 RX ADMIN — AMLODIPINE BESYLATE 10 MG: 10 TABLET ORAL at 08:12

## 2023-12-28 RX ADMIN — ACETAMINOPHEN 1000 MG: 500 TABLET, FILM COATED ORAL at 05:46

## 2023-12-28 ASSESSMENT — ACTIVITIES OF DAILY LIVING (ADL)
ADLS_ACUITY_SCORE: 35
ADLS_ACUITY_SCORE: 32
ADLS_ACUITY_SCORE: 40
ADLS_ACUITY_SCORE: 32
ADLS_ACUITY_SCORE: 40
ADLS_ACUITY_SCORE: 35
ADLS_ACUITY_SCORE: 32

## 2023-12-28 NOTE — PROGRESS NOTES
Care Management Follow Up    Length of Stay (days): 5    Expected Discharge Date: 12/28/2023     Concerns to be Addressed:       Patient plan of care discussed at interdisciplinary rounds: Yes    Anticipated Discharge Disposition: Skilled Nursing Facility, Transitional Care     Anticipated Discharge Services:    Anticipated Discharge DME:      Patient/family educated on Medicare website which has current facility and service quality ratings:    Education Provided on the Discharge Plan:    Patient/Family in Agreement with the Plan: yes    Referrals Placed by CM/SW:    Private pay costs discussed: Not applicable    Additional Information:  E  transportation rescheduled for [id number] as [facility] unable to admit patient until this afternoon.    Chelsey Liu RN

## 2023-12-28 NOTE — PROGRESS NOTES
Care Management Discharge Note    Discharge Date: 12/28/2023       Discharge Disposition: Skilled Nursing Facility, Transitional Care  Discharge Services: transport  Discharge DME:  none  Discharge Transportation: family or friend will provide    Private pay costs discussed: Not applicable    Does the patient's insurance plan have a 3 day qualifying hospital stay waiver?  No    PAS Confirmation Code:  [id number]  Patient/family educated on Medicare website which has current facility and service quality ratings:  yes    Education Provided on the Discharge Plan:  yes  Persons Notified of Discharge Plans: family, facility  Patient/Family in Agreement with the Plan: yes    Handoff Referral Completed: No    Additional Information:  Patient is set to discharge today, [facility] wheelchair pickup is between [phone] to go to [facility]. Hospitalist notified for orders, orders sent. Notified by [facility] that they are changing patient's room, room is not available until 1400. Ride pushed back to [phone] to go to [facility].     PAS-RR    D: Per DHS regulation, SW completed and submitted PAS-RR to [place] [employer] via the Senior [employer] Line.  PAS-RR confirmation # is : [id number]    P: Further questions may be directed to Senior [employer] Line at #[phone], option #4 for PAS-RR staff.    DORIS Hernandez, LGSW   Social Work   [facility]

## 2023-12-28 NOTE — PLAN OF CARE
Goal Outcome Evaluation:    Fall, confusion   DATE & TIME: 12/27/2023 3393-5178    Cognitive Concerns/ Orientation : A/ox4 forgetful at times    BEHAVIOR & AGGRESSION TOOL COLOR: green   ABNL VS/O2: VSS on room air , hypertensive labetalol given   MOBILITY: A1 jessica steady   PAIN MAGMENT: Tylenol x2, back pain into right buttock   DIET: reg thin liquids   BOWEL/BLADDER: continent BB   ABNL LAB/BG: see labs   DRAIN/DEVICES: RPIV saline locked  TELEMETRY RHYTHM: NSR   SKIN: scattered bruising   D/C DATE: 12/28/2023 8739-1397 wheel chair    OTHER IMPORTANT INFO: SBP <150

## 2023-12-28 NOTE — PLAN OF CARE
"Physical Therapy Discharge Summary    Reason for therapy discharge:    Discharged to transitional care facility.    Progress towards therapy goal(s). See goals on Care Plan in [facility] electronic health record for goal details.  Goals not met.  Barriers to achieving goals:   discharge from facility.    Therapy recommendation(s):    Continued therapy is recommended.  Rationale/Recommendations:  Per prior PT notes, \"Pt is below baseline. Pt currently requiring assist with all functional mobility. Pt limited by dizziness. Pt presents with deficits in activity tolerance, balance, and strength. Due to these deficits, pt is a high falls risk and unsafe to return home at this time, as pt lives alone. Pt would benefit from continued skilled PT services via TCU to address deficits and improve IND with safety and functional mobility in order to return to [facility].\".    Patient not seen by this therapist on this date, discharge summary written based on chart review and previous PT notes. Please see PT flowsheets for further details.    "

## 2023-12-28 NOTE — PLAN OF CARE
Occupational Therapy Discharge Summary    Reason for therapy discharge:    Discharged to transitional care facility.    Progress towards therapy goal(s). See goals on Care Plan in Epic electronic health record for goal details.  Goals partially met.  Barriers to achieving goals:   discharge from facility.    Therapy recommendation(s):      Continued therapy is recommended.  Rationale/Recommendations:   .OT Rationale for DC Rec: Patient below her IND baseline limited by pain, some confusion and impaired balance.    Writing therapist did not treat pt, note written based on previous treating therapist notes and recommendations. Please see chart and flow sheet for additional details.

## 2023-12-28 NOTE — DISCHARGE SUMMARY
[facility]  Hospitalist Discharge Summary      Date of Admission:  12/23/2023  Date of Discharge:  12/28/2023  Discharging Provider: Preet Wilson DO  Discharge Service: Hospitalist Service    Discharge Diagnoses   Angely Bryan is a 89 year old female with PMH significant for hypertension, dyslipidemia, CKD stage III, atrial fibrillation (on Eliquis), chronic low back pain with history of spinal stenosis (s/p fusion and epidural steroid injection in past), h/o CVA, h/o left carotid endarterectomy was brought to ED following a fall and some concern for confusion, noted with intraventricular hemorrhage, admitted inpatient 12/23/23.     Fall with likely acute traumatic intraventricular hemorrhage, likely normal pressure hydrocephalus, likely metabolic encephalopathy secondary to above, likely reactive leukocytosis CT head noted small volume of acute intraventricular hemorrhage and occipital horns of lateral ventricle; also noted moderate ventriculomegaly disproportionate to the degree of sulcal prominence with concern for likely NPH  Plan:   -Neurosurgery follow up as directed.   -Neurology follow up as directed.   -EEG negative for seizure.   -blood pressure control. Adjust as clinically indicated.   -amlodipine added for blood pressure control.    -close outpatient follow up.   -hold antiplatelet and anticoagulation.   -pt/ot     Worsening chronic low back pain, limited mobility secondary to above, H/o spinal stenosis (with h/o spinal fusion and epidural steroid injection) patient lives independently and family notes that her mobility has been getting very limited which might have contributed to her fall. had MRI lumbar spine done at outside hospital on 12/21/23 which was noted with multilevel spinal canal narrowing greatest at L2-L3, L3-L4 moderate to advanced; also noted multilevel foraminal narrowing greatest at L5-S1 on right, moderate to advanced. she had steroid injection in  bilateral SI joint on 12/22 at outside hospital  Plan:   -lidocaine patch  -PT/OT  -fall precautions  -XR lumbar spine completed, see report.      Hypertension, Dyslipidemia, atrial fibrillation (on Eliquis), Hypertensive Urgency   -holding anticoagulation   -hold PTA aspirin  -PTA Lipitor, doxazosin and Toprol-XL  -amlodipine.      Likely CKD stage III baseline creatinine noted around 1.3-1.4  -monitor               Physical Exam  Vital Signs: Temp: 98.4  F (36.9  C) Temp src: Oral BP: (!) 150/63 Pulse: 72   Resp: 18 SpO2: 92 % O2 Device: None (Room air)    Weight: 129 lbs 10.09 oz     GENERAL: Alert. Conversational.   HEENT: Normocephalic. Nares normal.   LUNGS: Clear to auscultation. No dyspnea at rest.   HEART: Irregular rate. Extremities perfused.   ABDOMEN: Soft, nontender, and nondistended. Positive bowel sounds.   EXTREMITIES: No LE edema noted.   NEUROLOGIC: Moves extremities x4 on command.    Clinically Significant Risk Factors          Follow-ups Needed After Discharge   Follow-up Appointments     Follow Up and recommended labs and tests      Follow up with Nursing home physician.    Follow up with primary care provider   Follow up with neurology and neurosurgery as directed.   Follow up with pain clinic as scheduled.            Unresulted Labs Ordered in the Past 30 Days of this Admission       No orders found from 11/23/2023 to 12/24/2023.        These results will be followed up by pcp    Discharge Disposition   Discharged to tcu  Condition at discharge: Stable    Hospital Course   See above    Consultations This Hospital Stay   NEUROSURGERY IP CONSULT  PHYSICAL THERAPY ADULT IP CONSULT  OCCUPATIONAL THERAPY ADULT IP CONSULT  CARE MANAGEMENT / SOCIAL WORK IP CONSULT  NEUROLOGY IP CONSULT  NUTRITION SERVICES ADULT IP CONSULT  PHYSICAL THERAPY ADULT IP CONSULT  OCCUPATIONAL THERAPY ADULT IP CONSULT    Code Status   No CPR- Do NOT Intubate    Time Spent on this Encounter   Preet LANDRY DO,  personally saw the patient today and spent less than or equal to 30 minutes discharging this patient.       Preet Wilson DO  [facility] NEUROSCIENCE UNIT  [address] [place] [address]  Phone: [phone]  ______________________________________________________________________    Physical Exam   Vital Signs: Temp: 98.4  F (36.9  C) Temp src: Oral BP: (!) 150/63 Pulse: 72   Resp: 18 SpO2: 92 % O2 Device: None (Room air)    Weight: 129 lbs 10.09 oz  Physical exam above.        Primary Care Physician   Rajiv Joe    Discharge Orders      General info for SNF    Length of Stay Estimate: Short Term Care: Estimated # of Days <30  Condition at Discharge: Improving  Level of care:skilled   Rehabilitation Potential: Fair  Admission H&P remains valid and up-to-date: Yes  Recent Chemotherapy: N/A  Use Nursing Home Standing Orders: Yes     Mantoux instructions    Give two-step Mantoux (PPD) Per Facility Policy Yes     Follow Up and recommended labs and tests    Follow up with Nursing home physician.    Follow up with primary care provider   Follow up with neurology and neurosurgery as directed.   Follow up with pain clinic as scheduled.     Reason for your hospital stay    Intracranial bleed     Activity - Up with nursing assistance     Physical Therapy Adult Consult    Evaluate and treat as clinically indicated.    Reason:  ich     Occupational Therapy Adult Consult    Evaluate and treat as clinically indicated.    Reason:  ich     Fall precautions     Diet    Follow this diet upon discharge: Orders Placed This Encounter      Room Service      Regular Diet Adult       Significant Results and Procedures   Results for orders placed or performed during the hospital encounter of 12/23/23   XR Chest 2 Views    Narrative    EXAM: XR CHEST 2 VIEWS  LOCATION: [facility]  DATE: 12/23/2023    INDICATION: cough, weakness, HA, back pain abdominal pain  COMPARISON:  None.      Impression    IMPRESSION: Negative chest.   CT Head w/o Contrast     Value    Radiologist flags Acute intracranial hemorrhage (AA)    Narrative    EXAM: CT HEAD WITHOUT CONTRAST  LOCATION: [facility]  DATE: 12/23/2023    INDICATION: Cough, weakness, headache, back pain, abdominal pain.  COMPARISON: Images from MRI of the brain 07/29/2015.  TECHNIQUE: Routine CT Head without IV contrast. Multiplanar reformats. Dose reduction techniques were used.    FINDINGS:  INTRACRANIAL CONTENTS: There is moderate ventriculomegaly disproportionate to the degree of sulcal prominence. This primarily involves the bilateral lateral ventricles, third ventricle. There appears to be small volume acute intraventricular hemorrhage   layering in the occipital horns of the lateral ventricles on both sides (series 2 image 14). No other acute intracranial hemorrhage identified. There is moderate to severe patchy and confluent nonspecific hypoattenuation in the cerebral white matter,   presumably due to chronic small vessel ischemic disease. Mild to moderate generalized brain parenchymal volume loss. No extra-axial fluid collection or significant mass effect/herniation. Scattered atherosclerotic calcifications.    VISUALIZED ORBITS/SINUSES/MASTOIDS: Prior bilateral cataract surgery. Visualized portions of the orbits are otherwise unremarkable. No paranasal sinus mucosal disease. No middle ear or mastoid effusion.    BONES/SOFT TISSUES: No acute abnormality.      Impression    IMPRESSION:  1.  Findings concerning for a small volume acute intraventricular blood products layering in the occipital horns of the lateral ventricles.  2.  Moderate ventriculomegaly disproportionate to the degree of sulcal prominence. This appears slightly more pronounced compared to prior studies, raising concern for hydrocephalus.  3.  Brain atrophy and presumed chronic small vessel ischemic changes, as described.      [Critical  Result: Acute intracranial hemorrhage]    Finding was identified on 12/23/2023 12:22 PM CST.     Dr. Ocasio was contacted by me on 12/23/2023 12:32 PM CST and verbalized understanding of the critical result.        CT Abdomen Pelvis w Contrast    Narrative    EXAM: CT ABDOMEN PELVIS W CONTRAST  LOCATION: [facility]  DATE: 12/23/2023    INDICATION: cough, weakness, HA, back pain abdominal pain  COMPARISON: None.  TECHNIQUE: CT scan of the abdomen and pelvis was performed following injection of IV contrast. Multiplanar reformats were obtained. Dose reduction techniques were used.  CONTRAST: 65mL Isovue 370    FINDINGS:   LOWER CHEST: Right basilar atelectasis.    HEPATOBILIARY: Normal liver contours. Focal fat deposition adjacent to the falciform. Gallbladder is unremarkable. No biliary ductal dilation.    PANCREAS: Normal.    SPLEEN: Normal.    ADRENAL GLANDS: Normal.    KIDNEYS/BLADDER: Kidneys enhance symmetrically. Bilateral benign cysts (which require no follow-up). No urolithiasis or hydronephrosis. Normal bladder contours.    BOWEL: No bowel obstruction. Sigmoid diverticulosis without evidence of diverticulitis. No free fluid or free air. Large hiatal hernia.    LYMPH NODES: No lymphadenopathy.    VASCULATURE: Severe atherosclerotic calcifications of the abdominal aorta and its branches without aneurysm.    PELVIC ORGANS: Uterus is present. No adnexal masses.    MUSCULOSKELETAL: Lower lumbar spinal fusion hardware. Bones are demineralized. Multilevel degenerative disc disease and facet osteoarthritis. No destructive osseous lesions or acute fracture.      Impression    IMPRESSION:   1.  No acute abnormality in the abdomen or pelvis.  2.  Additional chronic details in the findings.   MR Brain w/o & w Contrast    Narrative    EXAM: MR BRAIN W/O and W CONTRAST  LOCATION: [facility]  DATE: 12/23/2023    INDICATION: NPH  COMPARISON: Same day head CT. Head and neck  CTA: 08/18/2015.  CONTRAST: 5mL Gadavist  TECHNIQUE: Routine multiplanar multisequence head MRI without and with intravenous contrast.    FINDINGS:  INTRACRANIAL CONTENTS: No restricted diffusion to suggest acute infarct. Similar small volume blood products layering in the occipital horns. There is confluent T2/FLAIR hyperintense signal abnormality in the periventricular white matter and subcortical   white matter of both cerebral hemispheres. This is grossly similar to degree of periventricular hypoattenuation from head and neck CTA performed in August 2015. Similar diffuse ventriculomegaly. Moderate generalized parenchymal volume loss. Normal   position of the cerebellar tonsils. No pathologic contrast enhancement.    SELLA: No abnormality accounting for technique.    OSSEOUS STRUCTURES/SOFT TISSUES: Normal marrow signal. The major intracranial vascular flow voids are maintained.     ORBITS: No abnormality accounting for technique.     SINUSES/MASTOIDS: No paranasal sinus mucosal disease. No middle ear or mastoid effusion.       Impression    IMPRESSION:    1.  Similar small volume blood products layering in the occipital horns.  2.  Similar diffuse ventriculomegaly from same day CT study, which could represent hydrocephalus. Confluent T2/FLAIR hyperintense signal abnormality in the periventricular white matter of both cerebral hemispheres is grossly similar to CTA study from   August 2015 and most like represents the sequela of advanced chronic microvascular ischemic disease with some degree of mild underlying transependymal edema difficult to exclude entirely.  3.  No evidence of acute infarct.  4.  No pathologic contrast enhancement.   CT Head w/o Contrast    Narrative    EXAM: CT HEAD WITHOUT CONTRAST  LOCATION: [facility]  DATE: 12/24/2023    INDICATION: Follow-up intraventricular hemorrhage.  COMPARISON: CT head 12/23/2023.  TECHNIQUE: Routine CT Head without IV contrast.  Multiplanar reformats. Dose reduction techniques were used.    FINDINGS:  INTRACRANIAL CONTENTS: The previously seen intraventricular hemorrhage layering within the occipital horns has resolved. No new hemorrhage. No CT evidence of acute infarct. Moderate to advanced presumed chronic small vessel ischemic changes. Moderate   generalized volume loss with disproportionate enlargement of the ventricles compared to the sulci.     VISUALIZED ORBITS/SINUSES/MASTOIDS: No intraorbital abnormality. No paranasal sinus mucosal disease. No middle ear or mastoid effusion.    BONES/SOFT TISSUES: No acute abnormality.      Impression    IMPRESSION:  1.  Interval resolution of intraventricular hemorrhage. No new hemorrhage.  2.  Unchanged generalized volume loss and disproportionate enlargement of the ventricles compared to the sulci suggesting a component of hydrocephalus.  3.  Moderate to advanced chronic microvascular ischemic change.     CT Head w/o Contrast    Narrative    EXAM: CT HEAD W/O CONTRAST  12/26/2023 10:55 AM     HISTORY: bradycardiac episode, nausea, dec mentation       COMPARISON: 12/24/2023, MRI 12/23/2023    TECHNIQUE: Using multidetector thin collimation helical acquisition  technique, axial, coronal and sagittal CT images from the skull base  to the vertex were obtained without intravenous contrast.   (topogram) image(s) also obtained and reviewed. Dose reduction  techniques were used.    FINDINGS:  No acute intracranial hemorrhage, mass effect, or midline shift.  Subcortical, periventricular areas of white matter hypoattenuation;  advanced parenchymal volume loss and compensatory ventricular  dilatation, the latter which is disproportionate to the degree of  parenchymal volume loss. No CT findings of acute infarct.  Preserved  subarachnoid spaces.    Atraumatic calvarium. No substantial paranasal sinus mucosal disease.  Clear mastoid air cells. Nonfocal orbits.       Impression    IMPRESSION:   1. No CT  evidence for acute intracranial process.  2. 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 Ventricles remain disproportionately  dilated to the degree of volume loss.    MIKE HUSAIN DO         SYSTEM ID:  [id number]   XR Lumbar Spine 2/3 Views    Narrative    XR LUMBAR SPINE 2/3 VIEWS  12/27/2023 2:29 PM     HISTORY: back pain    COMPARISON: 12/23/2023 CT abdomen/pelvis       Impression    IMPRESSION: Diffuse osteopenia, which limits evaluation. Prior L4-L5  posterior fusion with bilateral pedicle screws at L4 and L5. Minimal  retrolisthesis of L2 on L3 as well as grade 1 anterolisthesis of L3 on  L4 and L4 on L5. Extensive multilevel disc space height loss and  endplate degeneration, greatest at L5-S1. Multilevel endplate  osteophyte formation. Lower lumbar facet arthropathy. Right greater  than left sacroiliac joint degenerative changes, with at least partial  fusion across the right SI joint. Mild right hip and pubic symphysis  osteoarthrosis. Extensive aortoiliac atherosclerotic calcification.     ILAN CONN MD         SYSTEM ID:  [id number]       Discharge Medications   Current Discharge Medication List        START taking these medications    Details   amLODIPine (NORVASC) 10 MG tablet Take 1 tablet (10 mg) by mouth daily  Qty: 30 tablet, Refills: 0    Associated Diagnoses: Intraventricular hemorrhage (H)      Lidocaine (LIDOCARE) 4 % Patch Place 1 patch onto the skin daily as needed for moderate pain To prevent lidocaine toxicity, patient should be patch free for 12 hrs daily.  Qty: 30 patch, Refills: 0    Associated Diagnoses: Intraventricular hemorrhage (H)      OLANZapine zydis (ZYPREXA) 5 MG ODT Take 1 tablet (5 mg) by mouth nightly as needed for agitation  Qty: 30 tablet, Refills: 0    Associated Diagnoses: Intraventricular hemorrhage (H)           CONTINUE these medications which have NOT CHANGED    Details   acetaminophen (TYLENOL) 500 MG tablet  Take 1,000 mg by mouth every 6 hours as needed for mild pain      albuterol (ALBUTEROL) 108 (90 BASE) MCG/ACT inhaler Inhale 2 puffs into the lungs as needed (use sparingly)       atorvastatin (LIPITOR) 40 MG tablet Take 40 mg by mouth every evening      cyanocobalamin (VITAMIN B-12) 1000 MCG tablet Take 1,000 mcg by mouth daily      doxazosin (CARDURA) 4 MG tablet Take 4 mg by mouth at bedtime      magnesium oxide 400 MG CAPS Take 1 capsule by mouth daily      metoprolol succinate ER (TOPROL XL) 50 MG 24 hr tablet Take 50 mg by mouth daily      Vitamin D3 (CHOLECALCIFEROL) 25 mcg (1000 units) tablet Take 1 tablet by mouth daily           STOP taking these medications       apixaban ANTICOAGULANT (ELIQUIS) 2.5 MG tablet Comments:   Reason for Stopping:         aspirin 81 MG tablet Comments:   Reason for Stopping:             Allergies   Allergies   Allergen Reactions    Losartan Swelling     Tongue swelling    Penicillins Rash     Tongue swelling

## 2023-12-28 NOTE — PROGRESS NOTES
[facility]    Medicine Progress Note - Hospitalist Service    Date of Admission:  12/23/2023    Assessment & Plan   Angely Bryan is a 89 year old female with PMH significant for hypertension, dyslipidemia, CKD stage III, atrial fibrillation (on Eliquis), chronic low back pain with history of spinal stenosis (s/p fusion and epidural steroid injection in past), h/o CVA, h/o left carotid endarterectomy was brought to ED following a fall and some concern for confusion, noted with intraventricular hemorrhage, admitted inpatient 12/23/23.     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   -Neurology following.   -fall and seizure precautions  -EEG negative for seizure.   -blood pressure control.   -amlodipine.      Worsening chronic low back pain, limited mobility secondary to above, H/o spinal stenosis (with h/o spinal fusion and epidural steroid injection) patient lives independently and family notes that her mobility has been getting very limited which might have contributed to her fall. had MRI lumbar spine done at outside hospital on 12/21/23 which was noted with multilevel spinal canal narrowing greatest at L2-L3, L3-L4 moderate to advanced; also noted multilevel foraminal narrowing greatest at L5-S1 on right, moderate to advanced. she had steroid injection in bilateral SI joint on 12/22 at outside hospital  Plan:   -neurosurgery following  -lidocaine patch  -PT/OT; social work  -fall precautions  -XR lumbar spine.      Hypertension, Dyslipidemia, atrial fibrillation (on Eliquis), Hypertensive Urgency    -holding anticoagulation   -hold PTA aspirin  -hydralazine/labetalol PRN   -PTA Lipitor, doxazosin and Toprol-XL  -amlodipine.      Likely CKD stage III baseline creatinine noted around 1.3-1.4  -monitor      DVT prophylaxis: Defer to neuro; holding PTA Eliquis as above    Disposition: plan for discharge on 12/28.           Diet: Regular Diet Adult  Room Service  Diet      Tuttle Catheter: Not present  Lines: None     Cardiac Monitoring: ACTIVE order. Indication: Syncope- high cardiac risk (48 hours)  Code Status: No CPR- Do NOT Intubate      Clinically Significant Risk Factors                  # Hypertension: Noted on problem list            # Financial/Environmental Concerns: none         Disposition Plan      Expected Discharge Date: 12/28/2023,  2:00 PM  Discharge Delays: *Early Discharge Anticipated                Preet Wilson DO  Hospitalist Service  [facility]  Securely message with [employer] (more info)  Text page via [employer] Paging/Directory   ______________________________________________________________________    Interval History   Discussed at length with patient and family members at bedside.    Physical Exam   Vital Signs: Temp: 98.4  F (36.9  C) Temp src: Oral BP: (!) 150/63 Pulse: 72   Resp: 18 SpO2: 92 % O2 Device: None (Room air)    Weight: 129 lbs 10.09 oz    GENERAL: Alert. Conversational.   HEENT: Normocephalic. Nares normal.   LUNGS: Clear to auscultation. No dyspnea at rest.   HEART: Irregular rate. Extremities perfused.   ABDOMEN: Soft, nontender, and nondistended. Positive bowel sounds.   EXTREMITIES: No LE edema noted.   NEUROLOGIC: Moves extremities x4 on command.    Medical Decision Making       29 MINUTES SPENT BY ME on the date of service doing chart review, history, exam, documentation & further activities per the note.      Data   Imaging results reviewed over the past 24 hrs:   Recent Results (from the past 24 hour(s))   XR Lumbar Spine 2/3 Views     Narrative    XR LUMBAR SPINE 2/3 VIEWS  12/27/2023 2:29 PM     HISTORY: back pain    COMPARISON: 12/23/2023 CT abdomen/pelvis       Impression    IMPRESSION: Diffuse osteopenia, which limits evaluation. Prior L4-L5  posterior fusion with bilateral pedicle screws at L4 and L5. Minimal  retrolisthesis of L2 on L3 as well as grade 1 anterolisthesis of L3 on  L4 and L4 on L5. Extensive multilevel disc space height loss and  endplate degeneration, greatest at L5-S1. Multilevel endplate  osteophyte formation. Lower lumbar facet arthropathy. Right greater  than left sacroiliac joint degenerative changes, with at least partial  fusion across the right SI joint. Mild right hip and pubic symphysis  osteoarthrosis. Extensive aortoiliac atherosclerotic calcification.     ILAN CONN MD         SYSTEM ID:  [id number]

## 2023-12-29 ENCOUNTER — PATIENT OUTREACH (OUTPATIENT)
Dept: CARE COORDINATION | Facility: CLINIC | Age: 88
End: 2023-12-29
Payer: MEDICARE

## 2023-12-29 NOTE — PROGRESS NOTES
Connected Care Resource Center: Connected [facility] Resource [place]    Background: Transitional Care Management program identified per system criteria and reviewed by Connected Care Resource Center team for possible outreach.    Assessment: Upon chart review, CCRC Team member will not proceed with patient outreach related to this episode of Transitional Care Management program due to reason below:    Non-[facility] TCU: CCRC team member noted patient discharged to TCU/ARU/LTACH. Patient is not established with a [facility] currently supported by Primary Care-Care Coordination therefore handoff to Primary Care-Care Coordination is not appropriate at this time.    Plan: Transitional Care Management episode addressed appropriately per reason noted above.      Meryl Allen MA  [facility] Care Resource [place], [facility]    *Connected Care Resource Team does NOT follow patient ongoing. Referrals are identified based on internal discharge reports and the outreach is to ensure patient has an understanding of their discharge instructions.

## 2024-11-15 ENCOUNTER — HOSPITAL ENCOUNTER (INPATIENT)
Facility: CLINIC | Age: 89
End: 2024-11-15
Attending: SOCIAL WORKER
Payer: MEDICARE

## 2024-11-15 ENCOUNTER — APPOINTMENT (OUTPATIENT)
Dept: GENERAL RADIOLOGY | Facility: CLINIC | Age: 89
End: 2024-11-15
Attending: SOCIAL WORKER
Payer: MEDICARE

## 2024-11-15 DIAGNOSIS — J96.21 ACUTE AND CHRONIC RESPIRATORY FAILURE WITH HYPOXIA (H): ICD-10-CM

## 2024-11-15 DIAGNOSIS — I61.5 INTRAVENTRICULAR HEMORRHAGE (H): ICD-10-CM

## 2024-11-15 DIAGNOSIS — J44.1 CHRONIC OBSTRUCTIVE PULMONARY DISEASE WITH ACUTE EXACERBATION (H): ICD-10-CM

## 2024-11-15 DIAGNOSIS — I48.0 PAROXYSMAL ATRIAL FIBRILLATION (H): Primary | ICD-10-CM

## 2024-11-15 LAB
ALBUMIN SERPL BCG-MCNC: 4 G/DL (ref 3.5–5.2)
ALP SERPL-CCNC: 82 U/L (ref 40–150)
ALT SERPL W P-5'-P-CCNC: 10 U/L (ref 0–50)
ANION GAP SERPL CALCULATED.3IONS-SCNC: 8 MMOL/L (ref 7–15)
AST SERPL W P-5'-P-CCNC: 18 U/L (ref 0–45)
ATRIAL RATE - MUSE: 101 BPM
BASE EXCESS BLDV CALC-SCNC: -4 MMOL/L (ref -3–3)
BASOPHILS # BLD AUTO: 0.1 10E3/UL (ref 0–0.2)
BASOPHILS NFR BLD AUTO: 1 %
BILIRUB SERPL-MCNC: 0.2 MG/DL
BUN SERPL-MCNC: 20.5 MG/DL (ref 8–23)
CALCIUM SERPL-MCNC: 9.5 MG/DL (ref 8.8–10.4)
CHLORIDE SERPL-SCNC: 103 MMOL/L (ref 98–107)
CREAT SERPL-MCNC: 1.15 MG/DL (ref 0.51–0.95)
D DIMER PPP FEU-MCNC: 0.6 UG/ML FEU (ref 0–0.5)
DIASTOLIC BLOOD PRESSURE - MUSE: NORMAL MMHG
EGFRCR SERPLBLD CKD-EPI 2021: 44 ML/MIN/1.73M2
EOSINOPHIL # BLD AUTO: 0.9 10E3/UL (ref 0–0.7)
EOSINOPHIL NFR BLD AUTO: 8 %
ERYTHROCYTE [DISTWIDTH] IN BLOOD BY AUTOMATED COUNT: 12.3 % (ref 10–15)
FLUAV RNA SPEC QL NAA+PROBE: NEGATIVE
FLUBV RNA RESP QL NAA+PROBE: NEGATIVE
GLUCOSE SERPL-MCNC: 114 MG/DL (ref 70–99)
HCO3 BLDV-SCNC: 25 MMOL/L (ref 21–28)
HCO3 SERPL-SCNC: 26 MMOL/L (ref 22–29)
HCT VFR BLD AUTO: 38.5 % (ref 35–47)
HGB BLD-MCNC: 12.2 G/DL (ref 11.7–15.7)
HOLD SPECIMEN: NORMAL
HOLD SPECIMEN: NORMAL
IMM GRANULOCYTES # BLD: 0.1 10E3/UL
IMM GRANULOCYTES NFR BLD: 1 %
INTERPRETATION ECG - MUSE: NORMAL
LACTATE BLD-SCNC: 0.9 MMOL/L
LYMPHOCYTES # BLD AUTO: 1.8 10E3/UL (ref 0.8–5.3)
LYMPHOCYTES NFR BLD AUTO: 16 %
MAGNESIUM SERPL-MCNC: 2 MG/DL (ref 1.7–2.3)
MCH RBC QN AUTO: 30.6 PG (ref 26.5–33)
MCHC RBC AUTO-ENTMCNC: 31.7 G/DL (ref 31.5–36.5)
MCV RBC AUTO: 97 FL (ref 78–100)
MONOCYTES # BLD AUTO: 1.3 10E3/UL (ref 0–1.3)
MONOCYTES NFR BLD AUTO: 11 %
NEUTROPHILS # BLD AUTO: 7.3 10E3/UL (ref 1.6–8.3)
NEUTROPHILS NFR BLD AUTO: 64 %
NRBC # BLD AUTO: 0 10E3/UL
NRBC BLD AUTO-RTO: 0 /100
NT-PROBNP SERPL-MCNC: 385 PG/ML (ref 0–1800)
P AXIS - MUSE: 83 DEGREES
PCO2 BLDV: 62 MM HG (ref 40–50)
PH BLDV: 7.21 [PH] (ref 7.32–7.43)
PLATELET # BLD AUTO: 185 10E3/UL (ref 150–450)
PO2 BLDV: 51 MM HG (ref 25–47)
POTASSIUM SERPL-SCNC: 4.3 MMOL/L (ref 3.4–5.3)
PR INTERVAL - MUSE: 202 MS
PROT SERPL-MCNC: 7.2 G/DL (ref 6.4–8.3)
QRS DURATION - MUSE: 100 MS
QT - MUSE: 344 MS
QTC - MUSE: 446 MS
R AXIS - MUSE: -46 DEGREES
RBC # BLD AUTO: 3.99 10E6/UL (ref 3.8–5.2)
RSV RNA SPEC NAA+PROBE: NEGATIVE
SAO2 % BLDV: 77 % (ref 70–75)
SARS-COV-2 RNA RESP QL NAA+PROBE: NEGATIVE
SODIUM SERPL-SCNC: 137 MMOL/L (ref 135–145)
SYSTOLIC BLOOD PRESSURE - MUSE: NORMAL MMHG
T AXIS - MUSE: 91 DEGREES
TROPONIN T SERPL HS-MCNC: 15 NG/L
TROPONIN T SERPL HS-MCNC: 16 NG/L
VENTRICULAR RATE- MUSE: 101 BPM
WBC # BLD AUTO: 11.4 10E3/UL (ref 4–11)

## 2024-11-15 PROCEDURE — 250N000011 HC RX IP 250 OP 636: Performed by: SOCIAL WORKER

## 2024-11-15 PROCEDURE — 94640 AIRWAY INHALATION TREATMENT: CPT | Mod: 76

## 2024-11-15 PROCEDURE — 250N000009 HC RX 250: Performed by: SOCIAL WORKER

## 2024-11-15 PROCEDURE — 93005 ELECTROCARDIOGRAM TRACING: CPT

## 2024-11-15 PROCEDURE — 94660 CPAP INITIATION&MGMT: CPT

## 2024-11-15 PROCEDURE — 99223 1ST HOSP IP/OBS HIGH 75: CPT | Mod: AI | Performed by: HOSPITALIST

## 2024-11-15 PROCEDURE — 82803 BLOOD GASES ANY COMBINATION: CPT

## 2024-11-15 PROCEDURE — 71046 X-RAY EXAM CHEST 2 VIEWS: CPT

## 2024-11-15 PROCEDURE — 250N000011 HC RX IP 250 OP 636: Performed by: HOSPITALIST

## 2024-11-15 PROCEDURE — 94640 AIRWAY INHALATION TREATMENT: CPT

## 2024-11-15 PROCEDURE — 96374 THER/PROPH/DIAG INJ IV PUSH: CPT

## 2024-11-15 PROCEDURE — 250N000013 HC RX MED GY IP 250 OP 250 PS 637: Performed by: HOSPITALIST

## 2024-11-15 PROCEDURE — 83735 ASSAY OF MAGNESIUM: CPT | Performed by: SOCIAL WORKER

## 2024-11-15 PROCEDURE — 84484 ASSAY OF TROPONIN QUANT: CPT | Performed by: SOCIAL WORKER

## 2024-11-15 PROCEDURE — 84450 TRANSFERASE (AST) (SGOT): CPT | Performed by: SOCIAL WORKER

## 2024-11-15 PROCEDURE — 87637 SARSCOV2&INF A&B&RSV AMP PRB: CPT | Performed by: SOCIAL WORKER

## 2024-11-15 PROCEDURE — 82947 ASSAY GLUCOSE BLOOD QUANT: CPT | Performed by: SOCIAL WORKER

## 2024-11-15 PROCEDURE — 250N000013 HC RX MED GY IP 250 OP 250 PS 637: Performed by: SOCIAL WORKER

## 2024-11-15 PROCEDURE — 250N000009 HC RX 250: Performed by: HOSPITALIST

## 2024-11-15 PROCEDURE — 120N000013 HC R&B IMCU

## 2024-11-15 PROCEDURE — 36415 COLL VENOUS BLD VENIPUNCTURE: CPT | Performed by: SOCIAL WORKER

## 2024-11-15 PROCEDURE — 999N000157 HC STATISTIC RCP TIME EA 10 MIN

## 2024-11-15 PROCEDURE — 99291 CRITICAL CARE FIRST HOUR: CPT | Mod: 25

## 2024-11-15 PROCEDURE — 83880 ASSAY OF NATRIURETIC PEPTIDE: CPT | Performed by: SOCIAL WORKER

## 2024-11-15 PROCEDURE — 5A09357 ASSISTANCE WITH RESPIRATORY VENTILATION, LESS THAN 24 CONSECUTIVE HOURS, CONTINUOUS POSITIVE AIRWAY PRESSURE: ICD-10-PCS | Performed by: SOCIAL WORKER

## 2024-11-15 PROCEDURE — 85379 FIBRIN DEGRADATION QUANT: CPT | Performed by: SOCIAL WORKER

## 2024-11-15 PROCEDURE — 85025 COMPLETE CBC W/AUTO DIFF WBC: CPT | Performed by: SOCIAL WORKER

## 2024-11-15 RX ORDER — ACETAMINOPHEN 325 MG/1
650 TABLET ORAL EVERY 4 HOURS PRN
COMMUNITY
End: 2024-11-15

## 2024-11-15 RX ORDER — AMOXICILLIN 250 MG
1 CAPSULE ORAL 2 TIMES DAILY
Status: DISCONTINUED | OUTPATIENT
Start: 2024-11-15 | End: 2024-11-24 | Stop reason: HOSPADM

## 2024-11-15 RX ORDER — AMLODIPINE BESYLATE 10 MG/1
10 TABLET ORAL DAILY
Status: DISCONTINUED | OUTPATIENT
Start: 2024-11-15 | End: 2024-11-19

## 2024-11-15 RX ORDER — POLYETHYLENE GLYCOL 3350 17 G/17G
17 POWDER, FOR SOLUTION ORAL 2 TIMES DAILY PRN
Status: DISCONTINUED | OUTPATIENT
Start: 2024-11-15 | End: 2024-11-20

## 2024-11-15 RX ORDER — ACETAMINOPHEN 500 MG
1000 TABLET ORAL 2 TIMES DAILY PRN
COMMUNITY

## 2024-11-15 RX ORDER — BISMUTH SUBSALICYLATE 262 MG/1
2 TABLET, CHEWABLE ORAL
COMMUNITY
End: 2024-11-15

## 2024-11-15 RX ORDER — METOPROLOL SUCCINATE 50 MG/1
50 TABLET, EXTENDED RELEASE ORAL DAILY
Status: DISCONTINUED | OUTPATIENT
Start: 2024-11-15 | End: 2024-11-20

## 2024-11-15 RX ORDER — AMOXICILLIN 250 MG
2 CAPSULE ORAL 2 TIMES DAILY
COMMUNITY

## 2024-11-15 RX ORDER — KETOROLAC TROMETHAMINE 15 MG/ML
15 INJECTION, SOLUTION INTRAMUSCULAR; INTRAVENOUS
Status: COMPLETED | OUTPATIENT
Start: 2024-11-15 | End: 2024-11-15

## 2024-11-15 RX ORDER — IBUPROFEN 600 MG/1
600 TABLET, FILM COATED ORAL ONCE
Status: COMPLETED | OUTPATIENT
Start: 2024-11-15 | End: 2024-11-15

## 2024-11-15 RX ORDER — ACETAMINOPHEN 500 MG
1000 TABLET ORAL 2 TIMES DAILY
COMMUNITY

## 2024-11-15 RX ORDER — LEVOFLOXACIN 5 MG/ML
750 INJECTION, SOLUTION INTRAVENOUS ONCE
Status: COMPLETED | OUTPATIENT
Start: 2024-11-15 | End: 2024-11-15

## 2024-11-15 RX ORDER — CARBOXYMETHYLCELLULOSE SODIUM 5 MG/ML
1 SOLUTION/ DROPS OPHTHALMIC
Status: DISCONTINUED | OUTPATIENT
Start: 2024-11-15 | End: 2024-11-24 | Stop reason: HOSPADM

## 2024-11-15 RX ORDER — METHYLPREDNISOLONE SODIUM SUCCINATE 125 MG/2ML
60 INJECTION INTRAMUSCULAR; INTRAVENOUS EVERY 12 HOURS
Status: COMPLETED | OUTPATIENT
Start: 2024-11-15 | End: 2024-11-16

## 2024-11-15 RX ORDER — LIDOCAINE 40 MG/G
CREAM TOPICAL
Status: DISCONTINUED | OUTPATIENT
Start: 2024-11-15 | End: 2024-11-24 | Stop reason: HOSPADM

## 2024-11-15 RX ORDER — BISACODYL 10 MG
10 SUPPOSITORY, RECTAL RECTAL DAILY PRN
Status: DISCONTINUED | OUTPATIENT
Start: 2024-11-15 | End: 2024-11-24 | Stop reason: HOSPADM

## 2024-11-15 RX ORDER — ONDANSETRON 2 MG/ML
4 INJECTION INTRAMUSCULAR; INTRAVENOUS ONCE
Status: DISCONTINUED | OUTPATIENT
Start: 2024-11-15 | End: 2024-11-15

## 2024-11-15 RX ORDER — ACETAMINOPHEN 325 MG/1
650 TABLET ORAL EVERY 4 HOURS PRN
Status: DISCONTINUED | OUTPATIENT
Start: 2024-11-15 | End: 2024-11-24 | Stop reason: HOSPADM

## 2024-11-15 RX ORDER — IPRATROPIUM BROMIDE AND ALBUTEROL SULFATE 2.5; .5 MG/3ML; MG/3ML
1 SOLUTION RESPIRATORY (INHALATION) 3 TIMES DAILY
COMMUNITY

## 2024-11-15 RX ORDER — ACETAMINOPHEN 500 MG
1000 TABLET ORAL ONCE
Status: DISCONTINUED | OUTPATIENT
Start: 2024-11-15 | End: 2024-11-15

## 2024-11-15 RX ORDER — LEVOFLOXACIN 5 MG/ML
500 INJECTION, SOLUTION INTRAVENOUS
Status: DISCONTINUED | OUTPATIENT
Start: 2024-11-17 | End: 2024-11-22

## 2024-11-15 RX ORDER — IPRATROPIUM BROMIDE AND ALBUTEROL SULFATE 2.5; .5 MG/3ML; MG/3ML
3 SOLUTION RESPIRATORY (INHALATION)
Status: DISCONTINUED | OUTPATIENT
Start: 2024-11-15 | End: 2024-11-19

## 2024-11-15 RX ORDER — LORATADINE 10 MG/1
10 TABLET ORAL DAILY
COMMUNITY

## 2024-11-15 RX ORDER — IPRATROPIUM BROMIDE AND ALBUTEROL SULFATE 2.5; .5 MG/3ML; MG/3ML
1 SOLUTION RESPIRATORY (INHALATION) EVERY 8 HOURS PRN
COMMUNITY

## 2024-11-15 RX ORDER — ANASTROZOLE 1 MG/1
1 TABLET ORAL DAILY
COMMUNITY
End: 2024-11-15

## 2024-11-15 RX ORDER — LIDOCAINE 4 G/G
1 PATCH TOPICAL
Status: DISCONTINUED | OUTPATIENT
Start: 2024-11-15 | End: 2024-11-15

## 2024-11-15 RX ORDER — AMOXICILLIN 250 MG
2 CAPSULE ORAL 2 TIMES DAILY
Status: DISCONTINUED | OUTPATIENT
Start: 2024-11-15 | End: 2024-11-24 | Stop reason: HOSPADM

## 2024-11-15 RX ORDER — CALCIUM CARBONATE 500 MG/1
1000 TABLET, CHEWABLE ORAL 4 TIMES DAILY PRN
Status: DISCONTINUED | OUTPATIENT
Start: 2024-11-15 | End: 2024-11-24 | Stop reason: HOSPADM

## 2024-11-15 RX ORDER — MAGNESIUM OXIDE 400 MG/1
400 TABLET ORAL DAILY
Status: DISCONTINUED | OUTPATIENT
Start: 2024-11-16 | End: 2024-11-24 | Stop reason: HOSPADM

## 2024-11-15 RX ORDER — LORATADINE 10 MG/1
10 TABLET ORAL DAILY
Status: DISCONTINUED | OUTPATIENT
Start: 2024-11-16 | End: 2024-11-24 | Stop reason: HOSPADM

## 2024-11-15 RX ORDER — ONDANSETRON 4 MG/1
4 TABLET, ORALLY DISINTEGRATING ORAL EVERY 6 HOURS PRN
Status: DISCONTINUED | OUTPATIENT
Start: 2024-11-15 | End: 2024-11-24 | Stop reason: HOSPADM

## 2024-11-15 RX ORDER — GUAIFENESIN 200 MG/10ML
200 LIQUID ORAL EVERY 4 HOURS PRN
Status: DISCONTINUED | OUTPATIENT
Start: 2024-11-15 | End: 2024-11-24 | Stop reason: HOSPADM

## 2024-11-15 RX ORDER — BENZONATATE 100 MG/1
100 CAPSULE ORAL 3 TIMES DAILY PRN
COMMUNITY
End: 2024-11-15

## 2024-11-15 RX ORDER — CARBOXYMETHYLCELLULOSE SODIUM 5 MG/ML
1 SOLUTION/ DROPS OPHTHALMIC
COMMUNITY
End: 2024-11-15

## 2024-11-15 RX ORDER — LIDOCAINE 40 MG/G
CREAM TOPICAL
Status: DISCONTINUED | OUTPATIENT
Start: 2024-11-15 | End: 2024-11-22

## 2024-11-15 RX ORDER — ALBUTEROL SULFATE 0.83 MG/ML
2.5 SOLUTION RESPIRATORY (INHALATION)
Status: DISCONTINUED | OUTPATIENT
Start: 2024-11-15 | End: 2024-11-24 | Stop reason: HOSPADM

## 2024-11-15 RX ORDER — IPRATROPIUM BROMIDE AND ALBUTEROL SULFATE 2.5; .5 MG/3ML; MG/3ML
3 SOLUTION RESPIRATORY (INHALATION) ONCE
Status: DISCONTINUED | OUTPATIENT
Start: 2024-11-15 | End: 2024-11-15

## 2024-11-15 RX ORDER — POLYETHYLENE GLYCOL 3350 17 G/17G
1 POWDER, FOR SOLUTION ORAL 2 TIMES DAILY
COMMUNITY

## 2024-11-15 RX ORDER — LIDOCAINE 4 G/G
1 PATCH TOPICAL
Status: DISCONTINUED | OUTPATIENT
Start: 2024-11-16 | End: 2024-11-15

## 2024-11-15 RX ORDER — ACETAMINOPHEN 650 MG/1
650 SUPPOSITORY RECTAL EVERY 4 HOURS PRN
Status: DISCONTINUED | OUTPATIENT
Start: 2024-11-15 | End: 2024-11-24 | Stop reason: HOSPADM

## 2024-11-15 RX ORDER — ONDANSETRON 2 MG/ML
4 INJECTION INTRAMUSCULAR; INTRAVENOUS EVERY 6 HOURS PRN
Status: DISCONTINUED | OUTPATIENT
Start: 2024-11-15 | End: 2024-11-24 | Stop reason: HOSPADM

## 2024-11-15 RX ORDER — DOXAZOSIN 2 MG/1
4 TABLET ORAL AT BEDTIME
Status: DISCONTINUED | OUTPATIENT
Start: 2024-11-15 | End: 2024-11-24 | Stop reason: HOSPADM

## 2024-11-15 RX ORDER — LIDOCAINE 4 G/G
1 PATCH TOPICAL
Status: DISCONTINUED | OUTPATIENT
Start: 2024-11-15 | End: 2024-11-20

## 2024-11-15 RX ADMIN — SENNOSIDES AND DOCUSATE SODIUM 2 TABLET: 50; 8.6 TABLET ORAL at 20:27

## 2024-11-15 RX ADMIN — AMLODIPINE BESYLATE 10 MG: 10 TABLET ORAL at 18:09

## 2024-11-15 RX ADMIN — DOXAZOSIN 4 MG: 2 TABLET ORAL at 21:35

## 2024-11-15 RX ADMIN — ACETAMINOPHEN 650 MG: 325 TABLET, FILM COATED ORAL at 22:05

## 2024-11-15 RX ADMIN — IPRATROPIUM BROMIDE AND ALBUTEROL SULFATE 3 ML: .5; 3 SOLUTION RESPIRATORY (INHALATION) at 22:24

## 2024-11-15 RX ADMIN — KETOROLAC TROMETHAMINE 15 MG: 15 INJECTION, SOLUTION INTRAMUSCULAR; INTRAVENOUS at 15:30

## 2024-11-15 RX ADMIN — ONDANSETRON 4 MG: 2 INJECTION INTRAMUSCULAR; INTRAVENOUS at 07:28

## 2024-11-15 RX ADMIN — METHYLPREDNISOLONE SODIUM SUCCINATE 62.5 MG: 125 INJECTION, POWDER, FOR SOLUTION INTRAMUSCULAR; INTRAVENOUS at 18:08

## 2024-11-15 RX ADMIN — ACETAMINOPHEN 1000 MG: 500 TABLET ORAL at 08:52

## 2024-11-15 RX ADMIN — IPRATROPIUM BROMIDE AND ALBUTEROL SULFATE 3 ML: .5; 3 SOLUTION RESPIRATORY (INHALATION) at 08:01

## 2024-11-15 RX ADMIN — ACETAMINOPHEN 650 MG: 325 TABLET, FILM COATED ORAL at 18:09

## 2024-11-15 RX ADMIN — METOPROLOL SUCCINATE 50 MG: 50 TABLET, EXTENDED RELEASE ORAL at 18:09

## 2024-11-15 RX ADMIN — LEVOFLOXACIN 750 MG: 750 INJECTION, SOLUTION INTRAVENOUS at 08:47

## 2024-11-15 RX ADMIN — LIDOCAINE 1 PATCH: 4 PATCH TOPICAL at 08:48

## 2024-11-15 ASSESSMENT — ACTIVITIES OF DAILY LIVING (ADL)
ADLS_ACUITY_SCORE: 0

## 2024-11-15 ASSESSMENT — COLUMBIA-SUICIDE SEVERITY RATING SCALE - C-SSRS
2. HAVE YOU ACTUALLY HAD ANY THOUGHTS OF KILLING YOURSELF IN THE PAST MONTH?: NO
6. HAVE YOU EVER DONE ANYTHING, STARTED TO DO ANYTHING, OR PREPARED TO DO ANYTHING TO END YOUR LIFE?: NO
1. IN THE PAST MONTH, HAVE YOU WISHED YOU WERE DEAD OR WISHED YOU COULD GO TO SLEEP AND NOT WAKE UP?: NO

## 2024-11-15 NOTE — ED NOTES
"Administered ketorolac 15 mg IV at 15:30. Patient reported improvement in pain.   [facility]     Report given to RN at the [facility].   All belongings sent with patient.   Transferred patient to inpatient room accompanied by RN, Marisa baugh and her son.   Blood pressure 135/76, pulse 101, temperature 98.1  F (36.7  C), temperature source Temporal, resp. rate 20, height 1.626 m (5' 4\"), weight 58.5 kg (129 lb), SpO2 92%.    "

## 2024-11-15 NOTE — ED NOTES
Bed: ED05  Expected date:   Expected time:   Means of arrival:   Comments:  526 89F COPD exacerbation, duoneb

## 2024-11-15 NOTE — ED TRIAGE NOTES
Patient arrived via EMS with complaints of shortness of breath that she noted when she woke this morning. Patient is on 2 L supplemental O2 at home, she was found by EMS to be hypoxic on 2 L. Medics placed patient on 4 L O2 via mask, administered Duoneb x 2 and 125 mg IV solumedrol. Patient has a productivedry cough with yellow sputum, BG is WNL, VS are stable, EKG unremarkable.     Triage Assessment (Adult)       Row Name 11/15/24 0717          Triage Assessment    Airway WDL WDL        Respiratory WDL    Respiratory WDL X;rhythm/pattern     Rhythm/Pattern, Respiratory shortness of breath        Skin Circulation/Temperature WDL    Skin Circulation/Temperature WDL WDL        Cardiac WDL    Cardiac WDL WDL        Peripheral/Neurovascular WDL    Peripheral Neurovascular WDL WDL        Cognitive/Neuro/Behavioral WDL    Cognitive/Neuro/Behavioral WDL WDL

## 2024-11-15 NOTE — ED PROVIDER NOTES
Emergency Department Note      History of Present Illness     Chief Complaint   Copd Exacerbation    HPI   Angely Bryan is a 89 year old female with a history of COPD on O2 at home presenting via EMS from a care facility with shortness of breath.  The patient has had trouble breathing for 2 weeks intermittently, especially when laying flat. She woke up this morning and felt acutely worse. The patient endorses coughing up yellow/green sputum and intermittent nausea starting yesterday. She endorses L rib pain from a fall 6 months ago but denies any chest pain. No recent falls. No coughing up blood, fever, vomiting, diarrhea, or headache.  Patient reports she is not normally on oxygen except for when she is sick.    Independent Historian   None    Review of External Notes   I reviewed the neurology visit from 4/24/24: Seen for gait problem and memory loss, lives in [facility]     Last labs:   Cr 1.30 in January 2023  Hgb 13.1     I reviewed the patient's nursing home notes. She has been on prednisone and  doxycycline since November 9th. She can have oxygen up to four L to keep her at 90% saturation, has been using it continuously since November 1 it appears.  Today she was documented to be 85% on RA.     Past Medical History     Medical History and Problem List   HTN  HLD  CKD Stage III  Afib on Eliquis  Low back pain hx of spinal stenosis and fusions  CVA  Left carotid endarterectomy  Acute blood loss anemia  GERD  COPD  ICH   Fracture of one rib, left side   Hemothorax on left  Stoke   Dyspnea   Osteopenia   Peptic ulcer   Breast cancer   DNR       Medications   Albuterol   Amlodipine   Atorvastatin   Apixaban   Cyanocobalamin   Doxazosin   Hydralazine   Magnesium oxide   Metoprolol succinate   Olanzapine     Surgical History   Appendectomy   Endarterectomy carotid, left   Mastectomy, left   Lumbar fusion   Knee arthroscopy   Cataract removal     Physical Exam     Patient Vitals for the past 24 hrs:   BP Temp Temp  "src Pulse Resp SpO2 Height Weight   11/15/24 0732 (!) 176/83 98.1  F (36.7  C) Temporal 106 20 97 % 1.626 m (5' 4\") 58.5 kg (129 lb)     Physical Exam    General: Moderate respiratory distress.   HEENT: Conjunctivae clear, no scleral icterus, mucous membranes moist  Neuro: Alert, moving all extremities equally with intention  CV: Regular rate and rhythm, radial and DP pulses equal  Respiratory: Tachypnea present although able to speak in full sentences. Diffuse wheezes throughout.  Accessory muscle usage.  Abdomen: Soft, without rigidity or rebound throughout  MSK: RLE swelling > L that patient states is baseline    Diagnostics     Lab Results   Labs Ordered and Resulted from Time of ED Arrival to Time of ED Departure   COMPREHENSIVE METABOLIC PANEL - Abnormal       Result Value    Sodium 137      Potassium 4.3      Carbon Dioxide (CO2) 26      Anion Gap 8      Urea Nitrogen 20.5      Creatinine 1.15 (*)     GFR Estimate 44 (*)     Calcium 9.5      Chloride 103      Glucose 114 (*)     Alkaline Phosphatase 82      AST 18      ALT 10      Protein Total 7.2      Albumin 4.0      Bilirubin Total 0.2     CBC WITH PLATELETS AND DIFFERENTIAL - Abnormal    WBC Count 11.4 (*)     RBC Count 3.99      Hemoglobin 12.2      Hematocrit 38.5      MCV 97      MCH 30.6      MCHC 31.7      RDW 12.3      Platelet Count 185      % Neutrophils 64      % Lymphocytes 16      % Monocytes 11      % Eosinophils 8      % Basophils 1      % Immature Granulocytes 1      NRBCs per 100 WBC 0      Absolute Neutrophils 7.3      Absolute Lymphocytes 1.8      Absolute Monocytes 1.3      Absolute Eosinophils 0.9 (*)     Absolute Basophils 0.1      Absolute Immature Granulocytes 0.1      Absolute NRBCs 0.0     ISTAT GASES LACTATE VENOUS POCT - Abnormal    Lactic Acid POCT 0.9      Bicarbonate Venous POCT 25      O2 Sat, Venous POCT 77 (*)     pCO2 Venous POCT 62 (*)     pH Venous POCT 7.21 (*)     pO2 Venous POCT 51 (*)     Base Excess/Deficit (+/-) " POCT -4.0 (*)    NT PROBNP INPATIENT - Normal    N terminal Pro BNP Inpatient 385     MAGNESIUM - Normal    Magnesium 2.0     INFLUENZA A/B, RSV AND SARS-COV2 PCR       Imaging   Chest XR,  PA & LAT   Final Result   IMPRESSION: There are no acute infiltrates. The cardiac silhouette is   not enlarged. Pulmonary vasculature is unremarkable.      ASHLEIGH KRISHNAMURTHY MD            SYSTEM ID:  [id number]          EKG     Sinus tachycardia with a poor baseline  Rate 101   QTc 446    Independent Interpretation   CXR: Haziness of the right lower lobe area appears to be similar to chest x-ray from prior..    ED Course      Medications Administered   Medications   No lozenges or gum should be given while patient on BIPAP/AVAPS/AVAPS AE (has no administration in time range)   carboxymethylcellulose PF (REFRESH PLUS) 0.5 % ophthalmic solution 1 drop (has no administration in time range)   Patient may continue current oral medications (has no administration in time range)   acetaminophen (TYLENOL) tablet 1,000 mg (has no administration in time range)   Lidocaine (LIDOCARE) 4 % Patch 1 patch (has no administration in time range)   ondansetron (ZOFRAN) injection 4 mg (4 mg Intravenous $Given 11/15/24 0728)   ipratropium - albuterol 0.5 mg/2.5 mg/3 mL (DUONEB) neb solution 3 mL (3 mLs Nebulization $Given 11/15/24 0801)       Procedures   Procedures     Discussion of Management   Admitting HospitalistDom    ED Course   ED Course as of 11/15/24 1146   Fri Nov 15, 2024   0730 I obtained the history and examined the patient as above.    0805 I rechecked and updated the patient. I performed an ultrasound.    0819 I spoke with Dr. Fernandes from the hospitalist service regarding the patient's presentation, findings here in the ED, and plan of care.     0824 I spoke with pharmacy regarding the patient's presentation and plan of care.      0842 I rechecked and updated the patient. She initially felt improved on BiPAP however  now is feeling worse, I have placed her back onto nasal cannula.  We will call RT to see if patient can be placed on high flow nasal cannula for a little bit of PEEP.   6224 I spoke with Dr. Fernandes from the hospitalist service regarding the patient's presentation, findings here in the ED, and plan of care.    1004 I rechecked and updated the patient.         Additional Documentation  None    Medical Decision Making / Diagnosis     CMS Diagnoses: None    MIPS   None    MDM   Angely Bryan is a 89 year old female with above medical history who presents to the emergency department with shortness of breath in the setting of about a week of worsened breath, cough productive of green and yellow sputum.  On examination she is in moderate respiratory distress.  She is able to speak in full sentences however she does have accessory muscle usage and she is quite tachypneic.  Lungs notable for diffuse wheezes throughout.  Chest x-ray does not show any obvious focal pneumonia.  No chest pain and patient's troponin is essentially at baseline, EKG with a poor baseline however no obvious acute signs of ischemia.  Considered PE, patient does not actually take anticoagulation per the paperwork from the nursing facility, reassuringly D-dimer is within age-adjusted normal limits.  I suspect that her symptoms are most likely in the setting of a COPD exacerbation with increased sputum production wheezing and cough.  Bedside ultrasound does not show evidence of greater than 3 B-lines in multiple lung fields therefore feel that pulmonary edema is less likely as well.  BNP is not elevated.  I confirm with patient that she is DNR/DNI however she was willing to trial BiPAP.  Unfortunately she was not able to tolerate this for prolonged period of time.  Will place her on high flow nasal cannula.  Due to allergies (document allergies to penicillins as well as allergic reaction to cefazolin in her chart although the reaction is not documented),  unfortunately will only be able to give her Levaquin given that she has been on doxycycline for the past few days.  Discussed with hospitalist Dr. Fernandes who kindly accepted for admission to [facility] status.    Disposition   The patient was admitted to the hospital.     Diagnosis     ICD-10-CM    1. Chronic obstructive pulmonary disease with acute exacerbation (H)  J44.1       2. Acute and chronic respiratory failure with hypoxia (H)  J96.21            Discharge Medications   New Prescriptions    No medications on file         Scribe Disclosure:  I, Phoenix Peterson, am serving as a scribe at 7:36 AM on 11/15/2024 to document services personally performed by Le Nguyen MD based on my observations and the provider's statements to me.        Le Nguyen MD  11/15/24 2116

## 2024-11-15 NOTE — H&P
[facility]    History and Physical - Hospitalist Service       Date of Admission:  11/15/2024    Assessment & Plan      Angely Bryan is a 89 year old female with history of hypertension, hyperlipidemia, atrial fibrillation, CKD 3, CVA, GERD, ICH, breast cancer, low back pain, and COPD who presented to the ED via EMS with nearly 2 weeks worsening shortness of breath and productive cough.  She has had some ongoing rib pain from a fall several months ago but denies any new falls or chest pain today or in the recent days.  She has not had fevers or chills or vomiting but has had some nausea as of last night. She noted much worse breathing this morning in addition to wheezing and EMS was summoned. She received duonebs and solumedrol with EMS. She is breathing treated for COPD exacerbation.      COPD exacerbation  Acute hypoxic respiratory failure  Occasionally uses 2-3L of home O2 when ill, lately using it over the past 2 weeks or so. Productive cough and orthopnea. Breathing this morning felt much worse than recent days and she has been somewhat nauseous. No fevers, chills, or vomiting. Chronic chest pain from prior rib fracture noted but no new chest pain. No fever at home. Needing up to 4L and subsequently on bipap in ED. She has reportedly been on doxy for the past couple days.   *s/p duonebs and solumedrol  *CXR without acute infiltrate, no signs of pulm edema  *WBC 11.4  *LA 0.9, bnp 385  *D dimer 0.60, WNL for age adjustment  - admit to inpatient, IMC given bipap  - continue supplemental O2  - scheduled and prn nebs  - empiric abx given exacerbation, levofloxacin continued  - continue steroid, solumedrol 60 bid for now, likely de-escalate to PO pred in a day or two pending clinical course  - IS  - she confirmed she is DNR DNI and would not want pre arrest intubation either (son present for discussion in ED)    Atrial fibrillation, persistent   Mildly tachycardic in ED. Not anticoagulated  given history of intraparenchymal and intraventricular bleed while on anticoagulation previously.  - PTA metoprolol succ and ASA 81 to continue  - tele for now    CKD III   Creat 1.15 on admission, appears at baseline or even slightly better  - monitor    Hypertension  Hyperlipidemia  BP mildly elevated in ED.  - PTA amlodipine, metoprolol succ, and statin to continue    History of mild cognitive impairment  Was noted to have mild memory loss and some gait impairment which had been improving in the past year acting as below she does have history of intraparenchymal and intraventricular bleed while on anticoagulation for A-fib.  *MoCA 23/30 in April 2024  - Reorient as needed, encourage sleep-wake cycles  - OT consult    Hx of intraparenchymal and intraventricular bleed while on anticoagulation for a fib (December 2023)  Noted. Neurosurgery evaluated at that time in hospital. Watson to have memory impairment and gait instability that was improving in past year or so.   - not on AC, baby aspirin noted    Chronic low back pain  Appears at baseline.  - PTA regimen to continue      Diet: NPO for Medical/Clinical Reasons Except for: Meds    DVT Prophylaxis: DOAC  Tuttle Catheter: Not present  Lines: None     Cardiac Monitoring: None  Code Status: No CPR- Do NOT Intubate      Clinically Significant Risk Factors Present on Admission                   # Hypertension: Noted on problem list     # Acute Hypercapnic Respiratory Failure: based on venous blood gas results.  Continue supplemental oxygen and ventilatory support as indicated.            # Financial/Environmental Concerns:           Disposition Plan     Medically Ready for Discharge: Anticipated in 2-4 Days           Soto Fernandes MD  Hospitalist Service  [facility]  Securely message with [employer] (more info)  Text page via [facility] Paging/Directory     ______________________________________________________________________    Chief Complaint    SOB, cough, wheezing    History is obtained from the patient    History of Present Illness   Angely Bryan is a 89 year old female with history of hypertension, hyperlipidemia, atrial fibrillation, CKD 3, CVA, GERD, ICH, breast cancer, low back pain, and COPD who presented to the ED via EMS with nearly 2 weeks worsening shortness of breath and productive cough.  She has had some ongoing rib pain from a fall several months ago but denies any new falls or chest pain today or in the recent days.  She has not had fevers or chills or vomiting but has had some nausea as of last night. She noted much worse breathing this morning in addition to wheezing and EMS was summoned. Of note, she reports some mild LE edema more so in the R leg which has not changed recently. She received duonebs and solumedrol with EMS.      Past Medical History    Past Medical History:   Diagnosis Date    Arthritis     Cancer (H) 1974    breast, left mastectomy    Chronic anticoagulation     COPD (chronic obstructive pulmonary disease) (H)     CVA (cerebral infarction) 7/3-/2015    Left sided CVA    GERD (gastroesophageal reflux disease)     Hypertension     Osteopenia 6/2011    T score of -2.3 in hip    Paroxysmal atrial fibrillation (H)     Peptic ulcer     Unspecified cerebral artery occlusion with cerebral infarction     Vitamin B deficiency 8/14/2015    Vitamin D deficiency        Past Surgical History   Past Surgical History:   Procedure Laterality Date    appendectomy      ENDARTERECTOMY CAROTID Left 8/18/2015    Procedure: ENDARTERECTOMY CAROTID;  Surgeon: Arnold Abel MD;  Location: [facility] OR    MASTECTOMY Left 10/20/1974    ORTHOPEDIC SURGERY  7/1/2005    Lumbar Fusion    ORTHOPEDIC SURGERY      Knee Arthroscopy       Prior to Admission Medications   Prior to Admission Medications   Prescriptions Last Dose Informant Patient Reported? Taking?   Lidocaine (LIDOCARE) 4 % Patch   No No   Sig: Place 1 patch onto the skin daily as needed  for moderate pain To prevent lidocaine toxicity, patient should be patch free for 12 hrs daily.   OLANZapine zydis (ZYPREXA) 5 MG ODT   No No   Sig: Take 1 tablet (5 mg) by mouth nightly as needed for agitation   Vitamin D3 (CHOLECALCIFEROL) 25 mcg (1000 units) tablet   Yes No   Sig: Take 1 tablet by mouth daily   acetaminophen (TYLENOL) 500 MG tablet   Yes No   Sig: Take 1,000 mg by mouth every 6 hours as needed for mild pain   albuterol (ALBUTEROL) 108 (90 BASE) MCG/ACT inhaler  Self Yes No   Sig: Inhale 2 puffs into the lungs as needed (use sparingly)    amLODIPine (NORVASC) 10 MG tablet   No No   Sig: Take 1 tablet (10 mg) by mouth daily   atorvastatin (LIPITOR) 40 MG tablet  Self Yes No   Sig: Take 40 mg by mouth every evening   cyanocobalamin (VITAMIN B-12) 1000 MCG tablet   Yes No   Sig: Take 1,000 mcg by mouth daily   doxazosin (CARDURA) 4 MG tablet   Yes No   Sig: Take 4 mg by mouth at bedtime   magnesium oxide 400 MG CAPS   Yes No   Sig: Take 1 capsule by mouth daily   metoprolol succinate ER (TOPROL XL) 50 MG 24 hr tablet   Yes No   Sig: Take 50 mg by mouth daily      Facility-Administered Medications: None        Review of Systems    The 10 point Review of Systems is negative other than noted in the HPI or here.     Social History   I have reviewed this patient's social history and updated it with pertinent information if needed.  Social History     Tobacco Use    Smoking status: Former     Current packs/day: 0.00     Average packs/day: 2.0 packs/day for 40.0 years (80.0 ttl pk-yrs)     Types: Cigarettes     Start date: 10/22/1951     Quit date: 10/22/1991     Years since quittin.0   Substance Use Topics    Alcohol use: Yes     Comment: 2 glasses wine /week    Drug use: No        Physical Exam   Vital Signs: Temp: 98.1  F (36.7  C) Temp src: Temporal BP: (!) 176/83 Pulse: 106   Resp: 20 SpO2: 97 % O2 Device: Nasal cannula Oxygen Delivery: 4 LPM  Weight: 129 lbs 0 oz    Gen: NAD, pleasant  HEENT:  EOMI, MMM  Resp: no focal crackles,  a few scattered wheezes, no increased work of resp  CV: S1S2 heard, reg rhythm, borderline tachy  Abdo: soft, nontender, nondistended, bowel sounds present  Ext: calves nontender, well perfused  Neuro: aa, conversing, moving ext, CN grossly intact, no facial asymmetry      Medical Decision Making       78 MINUTES SPENT BY ME on the date of service doing chart review, history, exam, documentation & further activities per the note.      Data     I have personally reviewed the following data over the past 24 hrs:    11.4 (H)  \   12.2   / 185     137 103 20.5 /  114 (H)   4.3 26 1.15 (H) \     ALT: 10 AST: 18 AP: 82 TBILI: 0.2   ALB: 4.0 TOT PROTEIN: 7.2 LIPASE: N/A     Trop: 16 (H) BNP: 385     Procal: N/A CRP: N/A Lactic Acid: 0.9       INR:  N/A PTT:  N/A   D-dimer:  0.60 (H) Fibrinogen:  N/A       Imaging results reviewed over the past 24 hrs:   Recent Results (from the past 24 hours)   Chest XR,  PA & LAT    Narrative    CHEST TWO VIEWS 11/15/2024 7:58 AM     HISTORY: COPD exac.    COMPARISON: December 23, 2023       Impression    IMPRESSION: There are no acute infiltrates. The cardiac silhouette is  not enlarged. Pulmonary vasculature is unremarkable.    ASHLEIGH KRISHNAMURTHY MD         SYSTEM ID:  [id number]

## 2024-11-15 NOTE — PHARMACY-ADMISSION MEDICATION HISTORY
Pharmacy Intern Admission Medication History    Admission medication history is complete. The information provided in this note is only as accurate as the sources available at the time of the update.    Information Source(s): Facility (U/NH/) medication list/MAR and CareEverywhere/SureScripts via  med list from [facility]     Pertinent Information: Pt resides at [facility]    Changes made to PTA medication list:  Added: Loratadine, Senna-S, Acetaminophen 500mg, Duoneb regimen, Miralax   Deleted: Vitamin B12, APAP 325mg, atorvastatin, olanzapine, albuterol   Changed: None    Allergies reviewed with patient and updates made in EHR: yes    Medication History Completed By: Margret Barlow 11/15/2024 10:06 AM    PTA Med List   Medication Sig Last Dose/Taking    acetaminophen (TYLENOL) 500 MG tablet Take 1,000 mg by mouth 2 times daily. Max 4g/24H 11/14/2024 Evening    acetaminophen (TYLENOL) 500 MG tablet Take 1,000 mg by mouth 2 times daily as needed for mild pain. Max 4g/24H Taking As Needed    amLODIPine (NORVASC) 10 MG tablet Take 1 tablet (10 mg) by mouth daily.  Hold if SBP <100) 11/14/2024 Morning    doxazosin (CARDURA) 4 MG tablet Take 4 mg by mouth at bedtime. Hold if SBP <100 11/14/2024 Bedtime    ipratropium - albuterol 0.5 mg/2.5 mg/3 mL (DUONEB) 0.5-2.5 (3) MG/3ML neb solution Take 1 vial by nebulization 3 times daily. 11/14/2024    ipratropium - albuterol 0.5 mg/2.5 mg/3 mL (DUONEB) 0.5-2.5 (3) MG/3ML neb solution Take 1 vial by nebulization every 8 hours as needed for shortness of breath or wheezing. Taking As Needed    Lidocaine (LIDOCARE) 4 % Patch Place 1 patch onto the skin daily as needed for moderate pain To prevent lidocaine toxicity, patient should be patch free for 12 hrs daily. 11/13/2024    loratadine (CLARITIN) 10 MG tablet Take 10 mg by mouth daily. 11/14/2024 Morning    magnesium oxide 400 MG CAPS Take 1 capsule by mouth daily 11/14/2024 Morning    metoprolol succinate ER (TOPROL XL) 50 MG 24  hr tablet Take 50 mg by mouth daily 11/14/2024 Morning    polyethylene glycol (MIRALAX) 17 GM/Dose powder Take 1 Capful by mouth 2 times daily. Unknown    senna-docusate (SENOKOT-S/PERICOLACE) 8.6-50 MG tablet Take 2 tablets by mouth 2 times daily. For constipation hold for diarrhea 11/14/2024 Evening    Vitamin D3 (CHOLECALCIFEROL) 25 mcg (1000 units) tablet Take 1 tablet by mouth daily 11/14/2024 Morning

## 2024-11-16 ENCOUNTER — APPOINTMENT (OUTPATIENT)
Dept: PHYSICAL THERAPY | Facility: CLINIC | Age: 89
DRG: 189 | End: 2024-11-16
Attending: HOSPITALIST
Payer: MEDICARE

## 2024-11-16 LAB
ANION GAP SERPL CALCULATED.3IONS-SCNC: 9 MMOL/L (ref 7–15)
BUN SERPL-MCNC: 27.3 MG/DL (ref 8–23)
CALCIUM SERPL-MCNC: 9.6 MG/DL (ref 8.8–10.4)
CHLORIDE SERPL-SCNC: 103 MMOL/L (ref 98–107)
CREAT SERPL-MCNC: 1.22 MG/DL (ref 0.51–0.95)
EGFRCR SERPLBLD CKD-EPI 2021: 41 ML/MIN/1.73M2
ERYTHROCYTE [DISTWIDTH] IN BLOOD BY AUTOMATED COUNT: 12.2 % (ref 10–15)
GLUCOSE SERPL-MCNC: 128 MG/DL (ref 70–99)
HCO3 SERPL-SCNC: 24 MMOL/L (ref 22–29)
HCT VFR BLD AUTO: 35.1 % (ref 35–47)
HGB BLD-MCNC: 11.3 G/DL (ref 11.7–15.7)
MCH RBC QN AUTO: 30.6 PG (ref 26.5–33)
MCHC RBC AUTO-ENTMCNC: 32.2 G/DL (ref 31.5–36.5)
MCV RBC AUTO: 95 FL (ref 78–100)
PLATELET # BLD AUTO: 188 10E3/UL (ref 150–450)
POTASSIUM SERPL-SCNC: 5.1 MMOL/L (ref 3.4–5.3)
RBC # BLD AUTO: 3.69 10E6/UL (ref 3.8–5.2)
SODIUM SERPL-SCNC: 136 MMOL/L (ref 135–145)
WBC # BLD AUTO: 13.5 10E3/UL (ref 4–11)

## 2024-11-16 PROCEDURE — 120N000013 HC R&B IMCU

## 2024-11-16 PROCEDURE — 94640 AIRWAY INHALATION TREATMENT: CPT | Mod: 76

## 2024-11-16 PROCEDURE — 36415 COLL VENOUS BLD VENIPUNCTURE: CPT | Performed by: HOSPITALIST

## 2024-11-16 PROCEDURE — 250N000013 HC RX MED GY IP 250 OP 250 PS 637: Performed by: HOSPITALIST

## 2024-11-16 PROCEDURE — 97161 PT EVAL LOW COMPLEX 20 MIN: CPT | Mod: GP | Performed by: PHYSICAL THERAPIST

## 2024-11-16 PROCEDURE — 250N000011 HC RX IP 250 OP 636: Performed by: HOSPITALIST

## 2024-11-16 PROCEDURE — 99232 SBSQ HOSP IP/OBS MODERATE 35: CPT | Performed by: HOSPITALIST

## 2024-11-16 PROCEDURE — 97530 THERAPEUTIC ACTIVITIES: CPT | Mod: GP | Performed by: PHYSICAL THERAPIST

## 2024-11-16 PROCEDURE — 999N000157 HC STATISTIC RCP TIME EA 10 MIN

## 2024-11-16 PROCEDURE — 85027 COMPLETE CBC AUTOMATED: CPT | Performed by: HOSPITALIST

## 2024-11-16 PROCEDURE — 80048 BASIC METABOLIC PNL TOTAL CA: CPT | Performed by: HOSPITALIST

## 2024-11-16 PROCEDURE — 250N000009 HC RX 250: Performed by: HOSPITALIST

## 2024-11-16 PROCEDURE — 82374 ASSAY BLOOD CARBON DIOXIDE: CPT | Performed by: HOSPITALIST

## 2024-11-16 PROCEDURE — 94640 AIRWAY INHALATION TREATMENT: CPT

## 2024-11-16 RX ORDER — CODEINE PHOSPHATE AND GUAIFENESIN 10; 100 MG/5ML; MG/5ML
5 SOLUTION ORAL
Status: DISCONTINUED | OUTPATIENT
Start: 2024-11-16 | End: 2024-11-24 | Stop reason: HOSPADM

## 2024-11-16 RX ADMIN — IPRATROPIUM BROMIDE AND ALBUTEROL SULFATE 3 ML: .5; 3 SOLUTION RESPIRATORY (INHALATION) at 08:50

## 2024-11-16 RX ADMIN — LORATADINE 10 MG: 10 TABLET ORAL at 08:38

## 2024-11-16 RX ADMIN — LIDOCAINE 1 PATCH: 4 PATCH TOPICAL at 08:41

## 2024-11-16 RX ADMIN — IPRATROPIUM BROMIDE AND ALBUTEROL SULFATE 3 ML: .5; 3 SOLUTION RESPIRATORY (INHALATION) at 12:43

## 2024-11-16 RX ADMIN — METHYLPREDNISOLONE SODIUM SUCCINATE 62.5 MG: 125 INJECTION, POWDER, FOR SOLUTION INTRAMUSCULAR; INTRAVENOUS at 21:43

## 2024-11-16 RX ADMIN — IPRATROPIUM BROMIDE AND ALBUTEROL SULFATE 3 ML: .5; 3 SOLUTION RESPIRATORY (INHALATION) at 02:16

## 2024-11-16 RX ADMIN — SENNOSIDES AND DOCUSATE SODIUM 2 TABLET: 50; 8.6 TABLET ORAL at 21:45

## 2024-11-16 RX ADMIN — ACETAMINOPHEN 650 MG: 325 TABLET, FILM COATED ORAL at 18:01

## 2024-11-16 RX ADMIN — IPRATROPIUM BROMIDE AND ALBUTEROL SULFATE 3 ML: .5; 3 SOLUTION RESPIRATORY (INHALATION) at 16:39

## 2024-11-16 RX ADMIN — Medication 400 MG: at 08:38

## 2024-11-16 RX ADMIN — IPRATROPIUM BROMIDE AND ALBUTEROL SULFATE 3 ML: .5; 3 SOLUTION RESPIRATORY (INHALATION) at 19:43

## 2024-11-16 RX ADMIN — SENNOSIDES AND DOCUSATE SODIUM 1 TABLET: 50; 8.6 TABLET ORAL at 08:38

## 2024-11-16 RX ADMIN — ACETAMINOPHEN 650 MG: 325 TABLET, FILM COATED ORAL at 12:23

## 2024-11-16 RX ADMIN — ACETAMINOPHEN 650 MG: 325 TABLET, FILM COATED ORAL at 22:00

## 2024-11-16 RX ADMIN — METHYLPREDNISOLONE SODIUM SUCCINATE 62.5 MG: 125 INJECTION, POWDER, FOR SOLUTION INTRAMUSCULAR; INTRAVENOUS at 08:41

## 2024-11-16 RX ADMIN — METOPROLOL SUCCINATE 50 MG: 50 TABLET, EXTENDED RELEASE ORAL at 08:38

## 2024-11-16 RX ADMIN — AMLODIPINE BESYLATE 10 MG: 10 TABLET ORAL at 08:38

## 2024-11-16 ASSESSMENT — ACTIVITIES OF DAILY LIVING (ADL)
ADLS_ACUITY_SCORE: 0
DEPENDENT_IADLS:: CLEANING;COOKING;LAUNDRY;SHOPPING;MEAL PREPARATION;MEDICATION MANAGEMENT;TRANSPORTATION;INCONTINENCE;MONEY MANAGEMENT
ADLS_ACUITY_SCORE: 0

## 2024-11-16 NOTE — PLAN OF CARE
[facility]. Alert and oriented x4, forgetful. Vital signs stable on HFNC 35% 30L. Not OOB, uses walker and wheelchair at home for mobility. Repositions self independently. Tolerating diet. Lungs sounds coarse, expiratory wheezes. Bowel sounds +. Passing flatus, BM -. Voiding adequately via purewick. Pain managed with Tylenol. Denies nausea. Neuro's and CMS intact. Tele SR. JACKIE SL.

## 2024-11-16 NOTE — CONSULTS
Care Management Initial Consult    General Information  Assessment completed with: Patient, Patient, Tanisha at [facility]  Type of CM/SW Visit: Initial Assessment    Primary Care Provider verified and updated as needed: Yes   Readmission within the last 30 days: no previous admission in last 30 days      Reason for Consult: discharge planning  Advance Care Planning: Advance Care Planning Reviewed: present on chart          Communication Assessment  Patient's communication style: spoken language (English or Bilingual)    Hearing Difficulty or Deaf: no   Wear Glasses or Blind: no    Cognitive  Cognitive/Neuro/Behavioral: WDL                      Living Environment:   People in home: facility resident     Current living Arrangements: extended care facility  Name of Facility: [facility]   Able to return to prior arrangements: yes       Family/Social Support:  Care provided by: other (see comments) (Facility Staff)  Provides care for: no one  Marital Status:   Support system: Children          Description of Support System: Supportive, Involved    Support Assessment: Adequate family and caregiver support, Adequate social supports    Current Resources:   Patient receiving home care services: No        Community Resources: None  Equipment currently used at home: walker, rolling, wheelchair, manual  Supplies currently used at home: Incontinence Supplies, oxygen and supplies    Employment/Financial:  Employment Status: retired        Financial Concerns: none   Referral to Financial Worker: No       Does the patient's insurance plan have a 3 day qualifying hospital stay waiver?  No    Lifestyle & Psychosocial Needs:  Social Drivers of Health     Food Insecurity: Low Risk  (11/15/2024)    Food Insecurity     Within the past 12 months, did you worry that your food would run out before you got money to buy more?: No     Within the past 12 months, did the food you bought just not last and you didn t have money to get  more?: No   Depression: Not at risk (12/7/2022)    [place] [facility] [place] [facility] [place]    PHQ-2     PHQ-2 TOTAL SCORE: 0   Housing Stability: High Risk (11/15/2024)    Housing Stability     Do you have housing? : No     Are you worried about losing your housing?: No   Tobacco Use: Medium Risk (1/9/2024)    Received from [facility]    Patient History     Smoking Tobacco Use: Former     Smokeless Tobacco Use: Never     Passive Exposure: Not on file   Financial Resource Strain: Low Risk  (11/15/2024)    Financial Resource Strain     Within the past 12 months, have you or your family members you live with been unable to get utilities (heat, electricity) when it was really needed?: No   Alcohol Use: Unknown (6/19/2019)    [facility], [facility]    AUDIT-C     Frequency of Alcohol Consumption: 4 or more times a week     Average Number of Drinks: 1 or 2     Frequency of Binge Drinking: Not on file   Transportation Needs: Low Risk  (11/15/2024)    Transportation Needs     Within the past 12 months, has lack of transportation kept you from medical appointments, getting your medicines, non-medical meetings or appointments, work, or from getting things that you need?: No   Physical Activity: Not on File (12/21/2023)    Received from [facility]    Physical Activity     Physical Activity: 0   Interpersonal Safety: High Risk (11/15/2024)    Interpersonal Safety     Do you feel physically and emotionally safe where you currently live?: No     Within the past 12 months, have you been hit, slapped, kicked or otherwise physically hurt by someone?: No     Within the past 12 months, have you been humiliated or emotionally abused in other ways by your partner or ex-partner?: No   Stress: Not on File (12/21/2023)    Received from [facility]     Stress     Stress: 0   Social Connections: Not on File (9/15/2024)    Received from [facility]    Social Connections     Connectedness: 0   Health Literacy: Not on file       Functional Status:  Prior to admission patient needed assistance:   Dependent ADLs:: Ambulation-walker  Dependent IADLs:: Cleaning, Cooking, Laundry, Shopping, Meal Preparation, Medication Management, Transportation, Incontinence, Money Management  Assesssment of Functional Status:  (TBD, but will return to LTC)    Mental Health Status:  Mental Health Status: No Current Concerns       Chemical Dependency Status:  Chemical Dependency Status: No Current Concerns             Values/Beliefs:  Spiritual, Cultural Beliefs, Zoroastrianism Practices, Values that affect care: no               Discussed  Partnership in Safe Discharge Planning  document with patient/family: No    Additional Information:  Per care management/social work consult for discharge planning and return to SNF on discharge.  Patient was admitted on 11/15/24 with shortness of breath.  The tentative date of discharge is yet to be determined.  Reviewed chart.  Patient was admitted from [facility].  Call placed to [facility] to check the bed hold status.  Per Tanisha, patient has a MA bed hold and can return there on discharge.   Tanisha states she lives in the LTC unit.  At baseline patient uses a rolling walker or a patrick wheelchair, depending on the distance. Patient uses 2-4 liters of oxygen at baseline.  Patient is planning on returning to [facility] on discharge.  Initial referral sent, via the discharge navigator, to begin the discharge planning process.     Next Steps:   Determine transportation.      DORIS Meehan, [facility]    [phone]  [facility]

## 2024-11-16 NOTE — PROGRESS NOTES
Respiratory care note - SpO2 94% in HFNC @ 38% and 40 LPM. Breath sounds expiratory wheezes heard throughout lung field. Nebulizers given per orders. Skin integrity is good and intact. RT to continue to follow.

## 2024-11-16 NOTE — PROGRESS NOTES
[facility]    Medicine Progress Note - Hospitalist Service    Date of Admission:  11/15/2024    Assessment & Plan   Angely Bryan is a 89 year old female with history of hypertension, hyperlipidemia, atrial fibrillation, CKD 3, CVA, GERD, ICH, breast cancer, low back pain, and COPD who presented to the ED via EMS with nearly 2 weeks worsening shortness of breath and productive cough.  She has had some ongoing rib pain from a fall several months ago but denies any new falls or chest pain today or in the recent days.  She has not had fevers or chills or vomiting but has had some nausea as of last night. She noted much worse breathing this morning in addition to wheezing and EMS was summoned. She received duonebs and solumedrol with EMS. She is breathing treated for COPD exacerbation.        COPD exacerbation  Acute hypoxic respiratory failure  Occasionally uses 2-3L of home O2 when ill, lately using it over the past 2 weeks or so. Productive cough and orthopnea. Breathing this morning felt much worse than recent days and she has been somewhat nauseous. No fevers, chills, or vomiting. Chronic chest pain from prior rib fracture noted but no new chest pain. No fever at home. Needing up to 4L and subsequently on bipap in ED. She has reportedly been on doxy for the past couple days.   *s/p duonebs and solumedrol  *CXR without acute infiltrate, no signs of pulm edema  *WBC 11.4  *LA 0.9, bnp 385  *D dimer 0.60, WNL for age adjustment  - admit to inpatient, IMC given bipap  - continue supplemental O2  - scheduled and prn nebs  - empiric abx given exacerbation, levofloxacin continued  - continue steroid, solumedrol 60 bid for now, likely de-escalate to PO pred in a day or two pending clinical course  - IS  - she confirmed she is DNR DNI and would not want pre arrest intubation either (son present for discussion in ED)  - 11/16 getting some production today, some improvement in general but still on HF      Atrial fibrillation, persistent   Mildly tachycardic in ED. Not anticoagulated given history of intraparenchymal and intraventricular bleed while on anticoagulation previously.  - PTA metoprolol succ and ASA 81 to continue  - tele for now, likely stop 11/17     CKD III   Creat 1.15 on admission, appears at baseline or even slightly better  - monitor     Hypertension  Hyperlipidemia  BP mildly elevated in ED.  - PTA amlodipine, metoprolol succ, and statin to continue     History of mild cognitive impairment  Was noted to have mild memory loss and some gait impairment which had been improving in the past year acting as below she does have history of intraparenchymal and intraventricular bleed while on anticoagulation for A-fib.  *MoCA 23/30 in April 2024  - Reorient as needed, encourage sleep-wake cycles  - OT consult     Hx of intraparenchymal and intraventricular bleed while on anticoagulation for a fib (December 2023)  Noted. Neurosurgery evaluated at that time in hospital. [place] to have memory impairment and gait instability that was improving in past year or so.   - not on AC, baby aspirin noted     Chronic low back pain  Appears at baseline.  - PTA regimen to continue          Diet: Regular Diet Adult    DVT Prophylaxis: DOAC  Tuttle Catheter: Not present  Lines: None     Cardiac Monitoring: ACTIVE order. Indication: copd exacerbation, hx a fib, IMC  Code Status: No CPR- Do NOT Intubate      Clinically Significant Risk Factors                   # Hypertension: Noted on problem list     # Acute Hypercapnic Respiratory Failure: based on venous blood gas results.  Continue supplemental oxygen and ventilatory support as indicated.             # Financial/Environmental Concerns:           Social Drivers of Health    Housing Stability: High Risk (11/15/2024)    Housing Stability     Do you have housing? : No     Are you worried about losing your housing?: No   Tobacco Use: Medium Risk (1/9/2024)    Received from   and [facility]    Patient History     Smoking Tobacco Use: Former     Smokeless Tobacco Use: Never   Alcohol Use: Unknown (6/19/2019)    Received from  and [facility], [facility]    AUDIT-C     Frequency of Alcohol Consumption: 4 or more times a week     Average Number of Drinks: 1 or 2   Physical Activity: Not on File (12/21/2023)    Received from [facility]    Physical Activity     Physical Activity: 0   Interpersonal Safety: High Risk (11/15/2024)    Interpersonal Safety     Do you feel physically and emotionally safe where you currently live?: No     Within the past 12 months, have you been hit, slapped, kicked or otherwise physically hurt by someone?: No     Within the past 12 months, have you been humiliated or emotionally abused in other ways by your partner or ex-partner?: No   Stress: Not on File (12/21/2023)    Received from [facility]    Stress     Stress: 0   Social Connections: Not on File (9/15/2024)    Received from [facility]    Social Connections     Connectedness: 0          Disposition Plan     Medically Ready for Discharge: Anticipated in 2-4 Days             Soto Fernandes MD  Hospitalist Service  [facility]  Securely message with [employer] (more info)  Text page via [employer] Paging/Directory   ______________________________________________________________________    Interval History   Seen and examined. On HF but now able to get some mucus/phlegm up with cough. No chest pain. No other new findings.    Physical Exam   Vital Signs: Temp: 98  F (36.7  C) Temp src: Oral BP: 106/86 Pulse: 73   Resp: 14 SpO2: 96 % O2 Device: High Flow Nasal Cannula (HFNC) Oxygen Delivery: 40 LPM  Weight: 134 lbs .63 oz    Gen: NAD, pleasant  HEENT: EOMI, MMM, on hfnc  Resp: no focal crackles,  a few scattered wheezes, no increased work of resp  CV: S1S2 heard, reg rhythm, reg rate  Abdo: soft,  nontender, nondistended, bowel sounds present  Ext: calves nontender, well perfused  Neuro: aa, conversant, moving ext, CN grossly intact, no facial asymmetry      Medical Decision Making       37 MINUTES SPENT BY ME on the date of service doing chart review, history, exam, documentation & further activities per the note.      Data     I have personally reviewed the following data over the past 24 hrs:    13.5 (H)  \   11.3 (L)   / 188     136 103 27.3 (H) /  128 (H)   5.1 24 1.22 (H) \       Imaging results reviewed over the past 24 hrs:   No results found for this or any previous visit (from the past 24 hours).

## 2024-11-16 NOTE — PLAN OF CARE
Patient Name: Zulay  MRN: [id number]  Date of Admission: 11/15/2024  Reason for Admission: Acute COPD exacerbation  Level of Care: [facility] [place]    Vitals: Stable  Pain: Pain goal: 2, Pain Ratin right hip pain, Effective pain medication/regimen: PRN tylenol, repositioning    CV Surgery Patient: No    Assessment    Resp: LS coarse, exp wheezes improved with nebs. SOB with exertion. Productive cough. HFNC 38% 40L  Telemetry: NSR w/ occ PVC's and PAC's, nonsustained PSVT run, asx  Neuro: A/o x 4, CMS intact  Diet: Regular  GI/: Purewick in place for I\O, -BM, +BS, denies n/v  Skin/Wounds: scattered bruising  Lines/Drains: R PIV SL  Activity: A x 1-2 GB, up to chair for lunch  Abnormal Labs: WBC 13.5, Creat 1.22    Aggression Stop Light: Green          Patient Care Plan: Continue plan of care: IV abx, nebs, IV steroids. Wean O2 as able. Encourage activity as tolerated. Pain management.      Goal Outcome Evaluation: progressing

## 2024-11-16 NOTE — PROGRESS NOTES
"   11/16/24 5981   Appointment Info   Signing Clinician's Name / Credentials (PT) MIMI Bill   Student Supervision On-site supervision provided;Direct supervision provided;Line of sight supervision provided;Direct Patient Contact Provided   Living Environment   People in Home alone   Current Living Arrangements independent living facility   Home Accessibility wheelchair accessible   Living Environment Comments Pt lives at [facility] on independent living plan   Self-Care   Usual Activity Tolerance good   Current Activity Tolerance fair   Regular Exercise Yes   Activity/Exercise Type walking   Exercise Amount/Frequency daily   Equipment Currently Used at Home walker, rolling;wheelchair, manual   Fall history within last six months no   Activity/Exercise/Self-Care Comment Pt notes she typically uses walker around the apartment, and either gets wheeled to dining titus for meals or self-propels her manual wheelchair. Pt does not receive any additional assistance on a daily basis.   General Information   Onset of Illness/Injury or Date of Surgery 11/15/24   Referring Physician Soto Fernandes MD   Patient/Family Therapy Goals Statement (PT) \"To go home\"   Pertinent History of Current Problem (include personal factors and/or comorbidities that impact the POC) Pt is a 89 yo female with PMHx of hypertension, hyperlipidemia, atrial fibrillation, CKD 3, CVA, GERD, ICH, breast cancer, low back pain, and COPD. Pt presented to the ED via EMS on 11/15/24 with nearly 2 weeks worsening shortness of breath and productive cough.  Pt also notes some ongoing rib pain from a past fall.   Existing Precautions/Restrictions fall;oxygen therapy device and L/min  (3L typically, on HFNC 40% currently)   Cognition   Affect/Mental Status (Cognition) WFL   Orientation Status (Cognition) oriented x 3   Follows Commands (Cognition) follows one-step commands;over 90% accuracy   Safety Deficit (Cognition) minimal " deficit;judgment;safety precautions awareness   Cognitive Status Comments Pt is having difficulty remembering the name of her independent living facility and takes extra time to recall facts.   Pain Assessment   Patient Currently in Pain No   Integumentary/Edema   Integumentary/Edema Comments Pt w/ AIME walker. Pt with some mild RLE edema   Posture    Posture Forward head position;Protracted shoulders   Range of Motion (ROM)   Range of Motion ROM is WFL   Strength (Manual Muscle Testing)   Strength (Manual Muscle Testing) Able to perform R SLR;Able to perform L SLR   Bed Mobility   Bed Mobility supine-sit   Comment, (Bed Mobility) minAx1 for supine to sit   Transfers   Transfers sit-stand transfer   Comment, (Transfers) minAx2 STS   Gait/Stairs (Locomotion)   Comment, (Gait/Stairs) Pre-gait activities initiated, seated marches   Balance   Balance Comments Sitting balance SBA, able to perform standing marches w/minAx1   Clinical Impression   Criteria for Skilled Therapeutic Intervention Yes, treatment indicated   PT Diagnosis (PT) Impaired functional activity tolerance   Influenced by the following impairments Decreased functional LE strength, decreased dynamic balance   Functional limitations due to impairments Impaired independence with functional mobility   Clinical Presentation (PT Evaluation Complexity) stable   Clinical Presentation Rationale See MR   Clinical Decision Making (Complexity) low complexity   Planned Therapy Interventions (PT) balance training;bed mobility training;gait training;home exercise program;neuromuscular re-education;patient/family education;postural re-education;ROM (range of motion);stair training;strengthening;stretching;transfer training;wheelchair management/propulsion training;progressive activity/exercise;risk factor education;home program guidelines   Risk & Benefits of therapy have been explained evaluation/treatment results reviewed;care plan/treatment goals  reviewed;risks/benefits reviewed;current/potential barriers reviewed;participants voiced agreement with care plan;participants included;patient   PT Total Evaluation Time   PT Eval, Low Complexity Minutes (74105) 10   Physical Therapy Goals   PT Frequency 5x/week   PT Predicted Duration/Target Date for Goal Attainment 11/23/24   PT Goals Bed Mobility;Transfers;Gait;PT Goal 1   PT: Bed Mobility Independent;Supine to/from sit;Rolling;Bridging   PT: Transfers Modified independent;Sit to/from stand;Bed to/from chair;Assistive device  (Walker)   PT: Gait Modified independent;Rolling walker;25 feet   PT: Goal 1 Pt will demonstrate score >45/56 on Aldana Balance Scale in order to have reduced risk of falls.   Interventions   Interventions Quick Adds Gait Training;Therapeutic Activity;Therapeutic Procedure   Therapeutic Procedure/Exercise   Treatment Detail/Skilled Intervention Edu on AROM for circulation benefits and reduced stiffness.   Therapeutic Activity   Therapeutic Activities: dynamic activities to improve functional performance Minutes (13436) 29   Symptoms Noted During/After Treatment Shortness of breath   Treatment Detail/Skilled Intervention Pt approached supine in bed. Pt with multiple mild coughing episodes, reports they are not as bad as earlier in the day. Notes very mild SOB during mobility. Increased time for line management, equipment gathering, room set up, skilled VS monitoring. See VSFS for details. Supine>sit cueing to roll onto L shoulder, use UE to help push up. Scooting to EOB, Ivis for trunk lean and hip movement, cueing to lean opposite direction of moving hip, use UE for support, feel floor with feet. Pt cued for seated marches, knees to ceiling. STS minAx2, cueing to lean forward, stand tall. Stand pivot to chair minAx2, cueing to reach back with UE, sit slowly. End of session, pt up in chair, chair alarm on, pillows for comfort, call light within reach, needs met, LE on footrest, pt eating  "lunch, RN notified.   Gait Training   Treatment Detail/Skilled Intervention Gait training in room: 4 shuffling steps bed to chair, Min A x2, with verbal cues for posture and breathing (exhale).   PT Discharge Planning   PT Plan Amb in hallway w/walker from home if able, increase upright tolerance, formal balance assessment   PT Discharge Recommendation (DC Rec) Transitional Care Facility   PT Rationale for DC Rec Pt is currently below baseline level of independence, currently needing minAx2 for transfers and minAx1 for bed mobility. At baseline, pt is independent with ADLs, using walker for mobility in home and w/c for transportation typically outside of apartment. In order to return home, pt requires some assistance with ADLs. Pt would require skilled rehab services at TCU in order to return to previous level of independence. However, if pt is able to reach baseline mobility, can consider home with assist.   PT Brief overview of current status Ax2 \"Goals of therapy will be to address safe mobility and make recs for d/c to next level of care. Pt and RN will continue to follow all falls risk precautions as documented by RN staff while hospitalized.\"   Physical Therapy Time and Intention   Timed Code Treatment Minutes 29   Total Session Time (sum of timed and untimed services) 39     "

## 2024-11-16 NOTE — PLAN OF CARE
Arrived from ED around 1730. A&Ox4 on HFNC. C/o R hip pain, tylenol x1. On a regular diet. Purewick in place. Sinus rhythm/tach, low 100's. PIV SL. No BM.

## 2024-11-16 NOTE — PLAN OF CARE
Goal Outcome Evaluation:      Plan of Care Reviewed With: patient          Outcome Evaluation: Discharge back to [facility]

## 2024-11-17 ENCOUNTER — APPOINTMENT (OUTPATIENT)
Dept: GENERAL RADIOLOGY | Facility: CLINIC | Age: 89
End: 2024-11-17
Payer: MEDICARE

## 2024-11-17 LAB
ALBUMIN SERPL BCG-MCNC: 3.8 G/DL (ref 3.5–5.2)
ALP SERPL-CCNC: 72 U/L (ref 40–150)
ALT SERPL W P-5'-P-CCNC: 13 U/L (ref 0–50)
AMYLASE SERPL-CCNC: 39 U/L (ref 28–100)
ANION GAP SERPL CALCULATED.3IONS-SCNC: 8 MMOL/L (ref 7–15)
ANION GAP SERPL CALCULATED.3IONS-SCNC: 8 MMOL/L (ref 7–15)
AST SERPL W P-5'-P-CCNC: 26 U/L (ref 0–45)
ATRIAL RATE - MUSE: 87 BPM
BILIRUB SERPL-MCNC: 0.2 MG/DL
BUN SERPL-MCNC: 33.1 MG/DL (ref 8–23)
BUN SERPL-MCNC: 33.1 MG/DL (ref 8–23)
CALCIUM SERPL-MCNC: 9.6 MG/DL (ref 8.8–10.4)
CALCIUM SERPL-MCNC: 9.6 MG/DL (ref 8.8–10.4)
CHLORIDE SERPL-SCNC: 102 MMOL/L (ref 98–107)
CHLORIDE SERPL-SCNC: 102 MMOL/L (ref 98–107)
CREAT SERPL-MCNC: 1.19 MG/DL (ref 0.51–0.95)
CREAT SERPL-MCNC: 1.19 MG/DL (ref 0.51–0.95)
DIASTOLIC BLOOD PRESSURE - MUSE: NORMAL MMHG
EGFRCR SERPLBLD CKD-EPI 2021: 42 ML/MIN/1.73M2
EGFRCR SERPLBLD CKD-EPI 2021: 42 ML/MIN/1.73M2
GLUCOSE SERPL-MCNC: 147 MG/DL (ref 70–99)
GLUCOSE SERPL-MCNC: 147 MG/DL (ref 70–99)
HCO3 SERPL-SCNC: 24 MMOL/L (ref 22–29)
HCO3 SERPL-SCNC: 24 MMOL/L (ref 22–29)
INTERPRETATION ECG - MUSE: NORMAL
LIPASE SERPL-CCNC: 16 U/L (ref 13–60)
MAGNESIUM SERPL-MCNC: 2.1 MG/DL (ref 1.7–2.3)
P AXIS - MUSE: 94 DEGREES
PHOSPHATE SERPL-MCNC: 2.9 MG/DL (ref 2.5–4.5)
POTASSIUM SERPL-SCNC: 4.8 MMOL/L (ref 3.4–5.3)
POTASSIUM SERPL-SCNC: 4.8 MMOL/L (ref 3.4–5.3)
PR INTERVAL - MUSE: 196 MS
PROT SERPL-MCNC: 6.9 G/DL (ref 6.4–8.3)
QRS DURATION - MUSE: 102 MS
QT - MUSE: 354 MS
QTC - MUSE: 425 MS
R AXIS - MUSE: -30 DEGREES
SODIUM SERPL-SCNC: 134 MMOL/L (ref 135–145)
SODIUM SERPL-SCNC: 134 MMOL/L (ref 135–145)
SYSTOLIC BLOOD PRESSURE - MUSE: NORMAL MMHG
T AXIS - MUSE: 85 DEGREES
TROPONIN T SERPL HS-MCNC: 16 NG/L
TROPONIN T SERPL HS-MCNC: 16 NG/L
VENTRICULAR RATE- MUSE: 87 BPM

## 2024-11-17 PROCEDURE — 83735 ASSAY OF MAGNESIUM: CPT

## 2024-11-17 PROCEDURE — 84484 ASSAY OF TROPONIN QUANT: CPT

## 2024-11-17 PROCEDURE — 999N000157 HC STATISTIC RCP TIME EA 10 MIN

## 2024-11-17 PROCEDURE — 94640 AIRWAY INHALATION TREATMENT: CPT | Mod: 76

## 2024-11-17 PROCEDURE — 99233 SBSQ HOSP IP/OBS HIGH 50: CPT | Performed by: HOSPITALIST

## 2024-11-17 PROCEDURE — 82150 ASSAY OF AMYLASE: CPT

## 2024-11-17 PROCEDURE — 250N000009 HC RX 250: Performed by: HOSPITALIST

## 2024-11-17 PROCEDURE — 71045 X-RAY EXAM CHEST 1 VIEW: CPT

## 2024-11-17 PROCEDURE — 36415 COLL VENOUS BLD VENIPUNCTURE: CPT

## 2024-11-17 PROCEDURE — 250N000013 HC RX MED GY IP 250 OP 250 PS 637: Performed by: HOSPITALIST

## 2024-11-17 PROCEDURE — 93005 ELECTROCARDIOGRAM TRACING: CPT

## 2024-11-17 PROCEDURE — 99207 PR NO BILLABLE SERVICE THIS VISIT: CPT

## 2024-11-17 PROCEDURE — 250N000011 HC RX IP 250 OP 636

## 2024-11-17 PROCEDURE — 93010 ELECTROCARDIOGRAM REPORT: CPT | Performed by: INTERNAL MEDICINE

## 2024-11-17 PROCEDURE — 83690 ASSAY OF LIPASE: CPT

## 2024-11-17 PROCEDURE — 82040 ASSAY OF SERUM ALBUMIN: CPT

## 2024-11-17 PROCEDURE — 250N000011 HC RX IP 250 OP 636: Performed by: HOSPITALIST

## 2024-11-17 PROCEDURE — 250N000013 HC RX MED GY IP 250 OP 250 PS 637

## 2024-11-17 PROCEDURE — 82310 ASSAY OF CALCIUM: CPT

## 2024-11-17 PROCEDURE — 120N000013 HC R&B IMCU

## 2024-11-17 PROCEDURE — 84100 ASSAY OF PHOSPHORUS: CPT

## 2024-11-17 RX ORDER — NITROGLYCERIN 0.4 MG/1
0.4 TABLET SUBLINGUAL EVERY 5 MIN PRN
Status: DISCONTINUED | OUTPATIENT
Start: 2024-11-17 | End: 2024-11-24 | Stop reason: HOSPADM

## 2024-11-17 RX ORDER — HYDROMORPHONE HCL IN WATER/PF 6 MG/30 ML
0.2 PATIENT CONTROLLED ANALGESIA SYRINGE INTRAVENOUS ONCE
Status: COMPLETED | OUTPATIENT
Start: 2024-11-17 | End: 2024-11-17

## 2024-11-17 RX ORDER — METOPROLOL TARTRATE 1 MG/ML
5 INJECTION, SOLUTION INTRAVENOUS EVERY 4 HOURS PRN
Status: DISCONTINUED | OUTPATIENT
Start: 2024-11-17 | End: 2024-11-17

## 2024-11-17 RX ORDER — METHYLPREDNISOLONE SODIUM SUCCINATE 125 MG/2ML
60 INJECTION INTRAMUSCULAR; INTRAVENOUS EVERY 12 HOURS
Status: COMPLETED | OUTPATIENT
Start: 2024-11-17 | End: 2024-11-17

## 2024-11-17 RX ORDER — METOPROLOL TARTRATE 1 MG/ML
5 INJECTION, SOLUTION INTRAVENOUS EVERY 4 HOURS PRN
Status: DISCONTINUED | OUTPATIENT
Start: 2024-11-17 | End: 2024-11-24 | Stop reason: HOSPADM

## 2024-11-17 RX ORDER — PREDNISONE 20 MG/1
40 TABLET ORAL DAILY
Status: COMPLETED | OUTPATIENT
Start: 2024-11-18 | End: 2024-11-20

## 2024-11-17 RX ORDER — HYDROMORPHONE HYDROCHLORIDE 1 MG/ML
INJECTION, SOLUTION INTRAMUSCULAR; INTRAVENOUS; SUBCUTANEOUS
Status: COMPLETED
Start: 2024-11-17 | End: 2024-11-17

## 2024-11-17 RX ORDER — HYDROMORPHONE HYDROCHLORIDE 1 MG/ML
0.3 INJECTION, SOLUTION INTRAMUSCULAR; INTRAVENOUS; SUBCUTANEOUS ONCE
Status: COMPLETED | OUTPATIENT
Start: 2024-11-17 | End: 2024-11-17

## 2024-11-17 RX ORDER — CYCLOBENZAPRINE HCL 5 MG
5 TABLET ORAL EVERY 8 HOURS PRN
Status: DISCONTINUED | OUTPATIENT
Start: 2024-11-17 | End: 2024-11-24 | Stop reason: HOSPADM

## 2024-11-17 RX ORDER — MAGNESIUM HYDROXIDE/ALUMINUM HYDROXICE/SIMETHICONE 120; 1200; 1200 MG/30ML; MG/30ML; MG/30ML
15 SUSPENSION ORAL 3 TIMES DAILY PRN
Status: DISCONTINUED | OUTPATIENT
Start: 2024-11-17 | End: 2024-11-24 | Stop reason: HOSPADM

## 2024-11-17 RX ORDER — NITROGLYCERIN 0.4 MG/1
TABLET SUBLINGUAL
Status: COMPLETED
Start: 2024-11-17 | End: 2024-11-17

## 2024-11-17 RX ADMIN — CYCLOBENZAPRINE 5 MG: 5 TABLET, FILM COATED ORAL at 05:20

## 2024-11-17 RX ADMIN — NITROGLYCERIN 0.4 MG: 0.4 TABLET SUBLINGUAL at 04:22

## 2024-11-17 RX ADMIN — ACETAMINOPHEN 650 MG: 325 TABLET, FILM COATED ORAL at 22:58

## 2024-11-17 RX ADMIN — METHYLPREDNISOLONE SODIUM SUCCINATE 62.5 MG: 125 INJECTION, POWDER, FOR SOLUTION INTRAMUSCULAR; INTRAVENOUS at 10:13

## 2024-11-17 RX ADMIN — DOXAZOSIN 4 MG: 2 TABLET ORAL at 22:57

## 2024-11-17 RX ADMIN — IPRATROPIUM BROMIDE AND ALBUTEROL SULFATE 3 ML: .5; 3 SOLUTION RESPIRATORY (INHALATION) at 02:28

## 2024-11-17 RX ADMIN — HYDROMORPHONE HYDROCHLORIDE 0.2 MG: 0.2 INJECTION, SOLUTION INTRAMUSCULAR; INTRAVENOUS; SUBCUTANEOUS at 06:29

## 2024-11-17 RX ADMIN — AMLODIPINE BESYLATE 10 MG: 10 TABLET ORAL at 10:25

## 2024-11-17 RX ADMIN — IPRATROPIUM BROMIDE AND ALBUTEROL SULFATE 3 ML: .5; 3 SOLUTION RESPIRATORY (INHALATION) at 14:08

## 2024-11-17 RX ADMIN — METHYLPREDNISOLONE SODIUM SUCCINATE 62.5 MG: 125 INJECTION, POWDER, FOR SOLUTION INTRAMUSCULAR; INTRAVENOUS at 20:16

## 2024-11-17 RX ADMIN — METOPROLOL SUCCINATE 50 MG: 50 TABLET, EXTENDED RELEASE ORAL at 10:25

## 2024-11-17 RX ADMIN — IPRATROPIUM BROMIDE AND ALBUTEROL SULFATE 3 ML: .5; 3 SOLUTION RESPIRATORY (INHALATION) at 07:16

## 2024-11-17 RX ADMIN — LIDOCAINE 1 PATCH: 4 PATCH TOPICAL at 10:16

## 2024-11-17 RX ADMIN — ACETAMINOPHEN 650 MG: 325 TABLET, FILM COATED ORAL at 18:09

## 2024-11-17 RX ADMIN — Medication 400 MG: at 10:25

## 2024-11-17 RX ADMIN — HYDROMORPHONE HYDROCHLORIDE 0.3 MG: 1 INJECTION, SOLUTION INTRAMUSCULAR; INTRAVENOUS; SUBCUTANEOUS at 04:46

## 2024-11-17 RX ADMIN — IPRATROPIUM BROMIDE AND ALBUTEROL SULFATE 3 ML: .5; 3 SOLUTION RESPIRATORY (INHALATION) at 19:20

## 2024-11-17 RX ADMIN — CYCLOBENZAPRINE 5 MG: 5 TABLET, FILM COATED ORAL at 13:58

## 2024-11-17 RX ADMIN — LORATADINE 10 MG: 10 TABLET ORAL at 10:25

## 2024-11-17 RX ADMIN — IPRATROPIUM BROMIDE AND ALBUTEROL SULFATE 3 ML: .5; 3 SOLUTION RESPIRATORY (INHALATION) at 10:34

## 2024-11-17 RX ADMIN — IPRATROPIUM BROMIDE AND ALBUTEROL SULFATE 3 ML: .5; 3 SOLUTION RESPIRATORY (INHALATION) at 00:07

## 2024-11-17 RX ADMIN — SENNOSIDES AND DOCUSATE SODIUM 1 TABLET: 50; 8.6 TABLET ORAL at 10:25

## 2024-11-17 RX ADMIN — ONDANSETRON 4 MG: 2 INJECTION INTRAMUSCULAR; INTRAVENOUS at 04:44

## 2024-11-17 RX ADMIN — LEVOFLOXACIN 500 MG: 5 INJECTION, SOLUTION INTRAVENOUS at 10:13

## 2024-11-17 RX ADMIN — IPRATROPIUM BROMIDE AND ALBUTEROL SULFATE 3 ML: .5; 3 SOLUTION RESPIRATORY (INHALATION) at 22:58

## 2024-11-17 RX ADMIN — ACETAMINOPHEN 650 MG: 325 TABLET, FILM COATED ORAL at 10:19

## 2024-11-17 RX ADMIN — SENNOSIDES AND DOCUSATE SODIUM 2 TABLET: 50; 8.6 TABLET ORAL at 20:16

## 2024-11-17 NOTE — CODE/RAPID RESPONSE
"Brief house NP note:    Patient woke from sleep very confused, removing her HFNC, her cardiac monitor, and her SpO2 monitor. Patient's HR at that time increased to the 170s and she stated that she could not breathe. [facility] staff assisted to replace her O2 and when rapid response team arrived patient was calm and cooperative and apologetic of her behavior. On my exam patient was A/Ox4 with stable vitals aside from hypertension. Patient stated that she woke up and felt like she was tied up with all the cords/tubes so it frightened her. I apologized for all the equipment and validated her fear waking up in a strange place. Patient endorsed having confusion in the past and stated that it is usually short-lived but she joked that she can be very destructive during these episodes.    BP (!) 180/96   Pulse 98   Temp 98.1  F (36.7  C) (Oral)   Resp 13   Ht 1.626 m (5' 4\")   Wt 60.8 kg (134 lb 0.6 oz)   SpO2 95%   BMI 23.01 kg/m      Continue to gently reorient and provide reassurance when patient has intermittent confusion.    No charge note.    HEIDY Milton, CNP  Hospitalist - House [facility]  Text me on the [employer] mikaela for a textback    "

## 2024-11-17 NOTE — PROGRESS NOTES
[facility]    Medicine Progress Note - Hospitalist Service    Date of Admission:  11/15/2024    Assessment & Plan   Angely Bryan is a 89 year old female with history of hypertension, hyperlipidemia, atrial fibrillation, CKD 3, CVA, GERD, ICH, breast cancer, low back pain, and COPD who presented to the ED via EMS with nearly 2 weeks worsening shortness of breath and productive cough.  She has had some ongoing rib pain from a fall several months ago but denies any new falls or chest pain today or in the recent days.  She has not had fevers or chills or vomiting but has had some nausea as of last night. She noted much worse breathing this morning in addition to wheezing and EMS was summoned. She received duonebs and solumedrol with EMS. She is breathing treated for COPD exacerbation.        COPD exacerbation  Acute hypoxic respiratory failure   Hx of rib fracture  Occasionally uses 2-3L of home O2 when ill, lately using it over the past 2 weeks or so. Productive cough and orthopnea. Breathing this morning felt much worse than recent days and she has been somewhat nauseous. No fevers, chills, or vomiting. Chronic chest pain from prior rib fracture noted but no new chest pain. No fever at home. Needing up to 4L and subsequently on bipap in ED. She has reportedly been on doxy for the past couple days.   *s/p duonebs and solumedrol  *CXR without acute infiltrate, no signs of pulm edema  *WBC 11.4  *LA 0.9, bnp 385  *D dimer 0.60, WNL for age adjustment  - admit to inpatient, IMC given bipap  - continue supplemental O2  - scheduled and prn nebs  - empiric abx given exacerbation, levofloxacin continued  - continue steroid, solumedrol 60 bid for now, prednisone 40 daily subsequently  - IS  - she confirmed she is DNR DNI and would not want pre arrest intubation either (son present for discussion in ED)  - 11/17 has had chest pain likely MSK - agree with house np notes during RRT overnight - pain  reproducible on palpation.    - if not improving, could consider CT scan - no new fx or findings on CXR overnight, EKG and trop not ischemic appearing  - consider pulmonary consult if not improving 11/18     Atrial fibrillation, persistent   Mildly tachycardic in ED. Not anticoagulated given history of intraparenchymal and intraventricular bleed while on anticoagulation previously.  - PTA metoprolol succ and ASA 81 to continue  - tele to continue given continued need for HF     CKD III   Creat 1.15 on admission, appears at baseline or even slightly better  - monitor     Hypertension  Hyperlipidemia  BP mildly elevated in ED.  - PTA amlodipine, metoprolol succ, and statin to continue     History of mild cognitive impairment  Was noted to have mild memory loss and some gait impairment which had been improving in the past year acting as below she does have history of intraparenchymal and intraventricular bleed while on anticoagulation for A-fib.  *MoCA 23/30 in April 2024  - Reorient as needed, encourage sleep-wake cycles  - OT consult     Hx of intraparenchymal and intraventricular bleed while on anticoagulation for a fib (December 2023)  Noted. Neurosurgery evaluated at that time in hospital. [place] to have memory impairment and gait instability that was improving in past year or so.   - not on AC, baby aspirin noted     Chronic low back pain  Appears at baseline.  - PTA regimen to continue          Diet: Regular Diet Adult    DVT Prophylaxis: DOAC  Tuttle Catheter: Not present  Lines: None     Cardiac Monitoring: ACTIVE order. Indication: copd exacerbation, hx a fib, IMC  Code Status: No CPR- Do NOT Intubate      Clinically Significant Risk Factors         # Hyponatremia: Lowest Na = 134 mmol/L in last 2 days, will monitor as appropriate           # Hypertension: Noted on problem list     # Acute Hypoxic Respiratory Failure: Documented O2 saturation < 90%. Continue supplemental oxygen as needed  # Acute Hypercapnic  Respiratory Failure: based on venous blood gas results.  Continue supplemental oxygen and ventilatory support as indicated.             # Financial/Environmental Concerns: none         Social Drivers of Health    Housing Stability: High Risk (11/15/2024)    Housing Stability     Do you have housing? : No     Are you worried about losing your housing?: No   Tobacco Use: Medium Risk (1/9/2024)    Received from [facility]    Patient History     Smoking Tobacco Use: Former     Smokeless Tobacco Use: Never   Alcohol Use: Unknown (6/19/2019)    Received from [facility], [facility]    AUDIT-C     Frequency of Alcohol Consumption: 4 or more times a week     Average Number of Drinks: 1 or 2   Physical Activity: Not on File (12/21/2023)    Received from [facility]    Physical Activity     Physical Activity: 0   Interpersonal Safety: High Risk (11/15/2024)    Interpersonal Safety     Do you feel physically and emotionally safe where you currently live?: No     Within the past 12 months, have you been hit, slapped, kicked or otherwise physically hurt by someone?: No     Within the past 12 months, have you been humiliated or emotionally abused in other ways by your partner or ex-partner?: No   Stress: Not on File (12/21/2023)    Received from [facility]    Stress     Stress: 0   Social Connections: Not on File (9/15/2024)    Received from [facility]    Social Connections     Connectedness: 0          Disposition Plan     Medically Ready for Discharge: Anticipated in 2-4 Days             Soto Fernandes MD  Hospitalist Service  [facility]  Securely message with [employer] (more info)  Text page via [employer] Paging/Directory   ______________________________________________________________________    Interval History   Seen and examined. Chest pain overnight, appreciate house staff assistance. Seems like  msk discomfort reproducible on palpation of L upper chest. Improved slightly today from last night but still present. No fevers chills. Cough at times remains. Appreciate RT assistance also.    Physical Exam   Vital Signs: Temp: 97.7  F (36.5  C) Temp src: Oral BP: 123/53 Pulse: 58   Resp: 14 SpO2: 95 % O2 Device: High Flow Nasal Cannula (HFNC) Oxygen Delivery: 40 LPM  Weight: 134 lbs .63 oz    Gen: NAD, pleasant, elderly  HEENT: EOMI, MMM  Resp: no focal crackles,  no wheezes, no increased work of resp  CV: S1S2 heard, reg rhythm, reg rate  Abdo: soft, nontender, nondistended, bowel sounds present  Ext: calves nontender, well perfused  Neuro: aa, conversant, moving ext, CN grossly intact, no facial asymmetry      Medical Decision Making       51 MINUTES SPENT BY ME on the date of service doing chart review, history, exam, documentation & further activities per the note.      Data     I have personally reviewed the following data over the past 24 hrs:    N/A  \   N/A   / N/A     134 (L); 134 (L) 102; 102 33.1 (H); 33.1 (H) /  147 (H); 147 (H)   4.8; 4.8 24; 24 1.19 (H); 1.19 (H) \     ALT: 13 AST: 26 AP: 72 TBILI: 0.2   ALB: 3.8 TOT PROTEIN: 6.9 LIPASE: 16     Trop: 16 (H) BNP: N/A       Imaging results reviewed over the past 24 hrs:   Recent Results (from the past 24 hours)   XR Chest Port 1 View    Narrative    EXAM: XR CHEST PORT 1 VIEW  LOCATION: [facility]  DATE: 11/17/2024    INDICATION: RRT: chest pain 10 10  COMPARISON: 11/15/2024      Impression    IMPRESSION: Shallow inspiration. Stable cardiomediastinal silhouette. Mild atelectasis or infiltrate left lung base. No vascular congestion or significant effusion. Atherosclerotic aorta.

## 2024-11-17 NOTE — PLAN OF CARE
Alert and oriented x4, forgetful. Vital signs stable on HFNC 40% 40L. Not OOB, uses walker for mobility. Repositions self independently. Tolerating diet. Lungs sounds coarse, expiratory wheezes. Bowel sounds +. Passing flatus, BM -. Voiding adequately via purewick. Pain managed with Tylenol. Denies nausea. Neuro's and CMS intact. Tele SR. PIV SL.     RRT this shift, see Douglas Chanel Albaro note.

## 2024-11-17 NOTE — PROVIDER NOTIFICATION
MD Notification    Notified Person: MD    Notified Person Name: Dr. Fernandes    Notification Date/Time: 11/17 @     Notification Interaction: Vocera message    Purpose of Notification: FYI Patient had PSVT run of 30 beats while at rest. Asymptomatic. Rhythm is back to NSR 80's. She had a couple of shorter runs yesterday as well, all non-sustained and asymptomatic.    Orders Received: No new orders.    Comments:

## 2024-11-17 NOTE — CODE/RAPID RESPONSE
[facility]  House [facility] RRT Note  11/17/2024   Time called: 0415  RRT called for: Chest pain  Code status: No CPR- Do NOT Intubate    Assessment & Plan   Angely Bryan is a 89 year old female with history of hypertension, hyperlipidemia, atrial fibrillation, CKD 3, CVA, GERD, ICH, breast cancer, low back pain, and COPD who presented to the ED via EMS with nearly 2 weeks worsening shortness of breath and productive cough. She has had some ongoing rib pain from a fall several months ago but denies any new falls or chest pain today or in the recent days. She has not had fevers or chills or vomiting but has had some nausea as of last night. She noted much worse breathing this morning in addition to wheezing and EMS was summoned. She received duonebs and solumedrol with EMS. She is breathing treated for COPD exacerbation.     I was paged to the bedside to evaluate Ms. Angely Bryan for an acute episode of chest pain 10/10 that was wavelike and worsened with respiration.    Patient endorsed having similar pain after her fall wherein she sustained rib fractures on the left side. Patient stated that she hadn't had pain like this for several months however. EKG was non-ischemic. Pain was reproducible with palpation to her left rib cage, mid-clavicular line, near the apex and radiating to her left, mid-axillary side. Patient was splinting and holding her breath to the point of causing hypoxia into the low 80s. Patient was also very nauseated and having several episodes of dry heaving. Patient was unsure if she was nauseated prior to the pain or not. After dilaudid 0.3mg and flexeril 5mg patient fell asleep and appeared calm with stable vitals.    Diagnosis:  -- Chest pain, suspect MSK  Pain is in the area of her previous rib fractures and is reproducible with palpation and worsens with deep breathing. Give dilaudid and flexeril and assess for improvement. Consider scheduled robaxin if pain persists.     --  ?Colic pain  -- ?indigestion  Patient was nauseated and dry heaving so treat with GI cocktail, elevate HoB. Pain is in the left chest so seems atypical for indigestion pain. Pain was wavelike in nature. Patient was unsure if she was nauseated prior to pain or not. Patient stated her last BM was on 11/14. Abdomen was soft and nontender.    Ddx:  - ACS: So far ruled out with non-ischemic EKG, check troponin. Nitrostat given during RRT and provided no relief of pain.  - PE: patient was briefly hypoxic during episode but suspect this was related to hypoinflation of lungs due to pain. When pain was adequately controlled her vitals were stable and HR decreased from 70s to 50s which is where she had been prior to pain episode. Hemodynamically stable.    Plan:  -- Dilaudid 0.3mg IV once   *Additional 0.2mg ordered 60 minutes later  -- GI cocktail  -- Flexeril 5mg PO Q8h PRN for muscle spasms  -- CXR  -- EKG: SR, non-ischemic  -- Nitroglycerin 0.4mg SL once   *No relief with nitroglycerin, suspect musculoskeletal  -- Labs   *CMP   *Troponin   *Magnesium   *Phosphorous   *amylase   *lipase    At the conclusion of this RRT patient was hemodynamically stable and will remain on current unit      Her history is significant for:  Past Medical History:   Diagnosis Date    Arthritis     Cancer (H) 1974    breast, left mastectomy    Chronic anticoagulation     COPD (chronic obstructive pulmonary disease) (H)     CVA (cerebral infarction) 7/3-/2015    Left sided CVA    GERD (gastroesophageal reflux disease)     Hypertension     Osteopenia 6/2011    T score of -2.3 in hip    Paroxysmal atrial fibrillation (H)     Peptic ulcer     Unspecified cerebral artery occlusion with cerebral infarction     Vitamin B deficiency 8/14/2015    Vitamin D deficiency      Past Surgical History:   Procedure Laterality Date    appendectomy      ENDARTERECTOMY CAROTID Left 8/18/2015    Procedure: ENDARTERECTOMY CAROTID;  Surgeon: Arnold Abel,  MD;  Location: [facility] OR    MASTECTOMY Left 10/20/1974    ORTHOPEDIC SURGERY  7/1/2005    Lumbar Fusion    ORTHOPEDIC SURGERY      Knee Arthroscopy       Review of Systems   The 10 point Review of Systems is negative other than noted in the HPI or here.     Allergies   Allergies   Allergen Reactions    Losartan Swelling     Tongue swelling    Penicillins Rash     Tongue swelling        Physical Exam   Physical Exam  Constitutional:       General: She is in acute distress.   Pulmonary:      Breath sounds: Normal breath sounds.      Comments: Pain with respirations  Abdominal:      General: Abdomen is flat. There is no distension.      Palpations: Abdomen is soft.      Tenderness: There is no abdominal tenderness.   Musculoskeletal:      Right lower leg: No edema.      Left lower leg: No edema.   Skin:     General: Skin is warm and dry.      Capillary Refill: Capillary refill takes less than 2 seconds.   Neurological:      General: No focal deficit present.      Mental Status: She is alert and oriented to person, place, and time.   Psychiatric:         Mood and Affect: Mood normal.         Vital Signs with Ranges:  Temp:  [98  F (36.7  C)-98.1  F (36.7  C)] 98.1  F (36.7  C)  Pulse:  [] 89  Resp:  [10-30] 15  BP: ()/() 168/78  FiO2 (%):  [35 %-76 %] 76 %  SpO2:  [88 %-98 %] 94 %  I/O last 3 completed shifts:  In: 480 [P.O.:480]  Out: 1600 [Urine:1600]    Data   Results for orders placed or performed during the hospital encounter of 11/15/24 (from the past 24 hours)   EKG 12-lead, tracing only   Result Value Ref Range    Systolic Blood Pressure  mmHg    Diastolic Blood Pressure  mmHg    Ventricular Rate 87 BPM    Atrial Rate 87 BPM    OK Interval 196 ms    QRS Duration 102 ms     ms    QTc 425 ms    P Axis 94 degrees    R AXIS -30 degrees    T Axis 85 degrees    Interpretation ECG       Sinus rhythm  Left axis deviation  Septal infarct (cited on or before 23-Dec-2023)  Abnormal ECG  When compared  with ECG of 15-Nov-2024 07:39,  Fusion complexes are no longer Present  Questionable change in initial forces of Anterior leads       COVID-19 PCR Results          12/23/2023    09:55 11/15/2024    07:29   COVID-19 PCR Results   SARS CoV2 PCR Negative  Negative      COVID-19 Antibody Results, Testing for Immunity           No data to display              EKG:  Interpreted by HEIDY Milton CNP  Time reviewed: 0417  Symptoms at time of EKG: Chest pain   Rhythm: normal sinus   Rate: Normal  Axis: Left Axis Deviation  Ectopy: none  Conduction: normal  ST Segments/ T Waves: No acute ischemic changes  Q Waves: none  Comparison to prior: SR has replaced ST  Clinical Impression: no acute changes      Time Spent on this Encounter   I spent 60 minutes on the unit/floor managing the care of Angely Bryan. Over 50% of my time was spent counseling the patient and/or coordinating care regarding services listed in this note.    HEIDY Milton, CNP  Hospitalist - House [facility]  Text me on the [email or web address] for a textback

## 2024-11-18 ENCOUNTER — APPOINTMENT (OUTPATIENT)
Dept: PHYSICAL THERAPY | Facility: CLINIC | Age: 89
End: 2024-11-18
Payer: MEDICARE

## 2024-11-18 ENCOUNTER — APPOINTMENT (OUTPATIENT)
Dept: OCCUPATIONAL THERAPY | Facility: CLINIC | Age: 89
End: 2024-11-18
Attending: HOSPITALIST
Payer: MEDICARE

## 2024-11-18 LAB
ANION GAP SERPL CALCULATED.3IONS-SCNC: 6 MMOL/L (ref 7–15)
BUN SERPL-MCNC: 35.9 MG/DL (ref 8–23)
CALCIUM SERPL-MCNC: 9.4 MG/DL (ref 8.8–10.4)
CHLORIDE SERPL-SCNC: 105 MMOL/L (ref 98–107)
CREAT SERPL-MCNC: 1.24 MG/DL (ref 0.51–0.95)
EGFRCR SERPLBLD CKD-EPI 2021: 40 ML/MIN/1.73M2
ERYTHROCYTE [DISTWIDTH] IN BLOOD BY AUTOMATED COUNT: 12.3 % (ref 10–15)
GLUCOSE SERPL-MCNC: 120 MG/DL (ref 70–99)
HCO3 SERPL-SCNC: 23 MMOL/L (ref 22–29)
HCT VFR BLD AUTO: 35.8 % (ref 35–47)
HGB BLD-MCNC: 11.7 G/DL (ref 11.7–15.7)
LACTATE SERPL-SCNC: 0.7 MMOL/L (ref 0.7–2)
MCH RBC QN AUTO: 30.7 PG (ref 26.5–33)
MCHC RBC AUTO-ENTMCNC: 32.7 G/DL (ref 31.5–36.5)
MCV RBC AUTO: 94 FL (ref 78–100)
PLATELET # BLD AUTO: 177 10E3/UL (ref 150–450)
POTASSIUM SERPL-SCNC: 4.9 MMOL/L (ref 3.4–5.3)
RBC # BLD AUTO: 3.81 10E6/UL (ref 3.8–5.2)
SODIUM SERPL-SCNC: 134 MMOL/L (ref 135–145)
WBC # BLD AUTO: 15.1 10E3/UL (ref 4–11)

## 2024-11-18 PROCEDURE — 250N000013 HC RX MED GY IP 250 OP 250 PS 637: Performed by: HOSPITALIST

## 2024-11-18 PROCEDURE — 99223 1ST HOSP IP/OBS HIGH 75: CPT | Performed by: INTERNAL MEDICINE

## 2024-11-18 PROCEDURE — 250N000012 HC RX MED GY IP 250 OP 636 PS 637: Performed by: HOSPITALIST

## 2024-11-18 PROCEDURE — 999N000157 HC STATISTIC RCP TIME EA 10 MIN

## 2024-11-18 PROCEDURE — 36415 COLL VENOUS BLD VENIPUNCTURE: CPT | Performed by: HOSPITALIST

## 2024-11-18 PROCEDURE — 250N000009 HC RX 250: Performed by: INTERNAL MEDICINE

## 2024-11-18 PROCEDURE — 85014 HEMATOCRIT: CPT | Performed by: HOSPITALIST

## 2024-11-18 PROCEDURE — 80048 BASIC METABOLIC PNL TOTAL CA: CPT | Performed by: HOSPITALIST

## 2024-11-18 PROCEDURE — 99233 SBSQ HOSP IP/OBS HIGH 50: CPT | Performed by: STUDENT IN AN ORGANIZED HEALTH CARE EDUCATION/TRAINING PROGRAM

## 2024-11-18 PROCEDURE — 97530 THERAPEUTIC ACTIVITIES: CPT | Mod: GP

## 2024-11-18 PROCEDURE — 97165 OT EVAL LOW COMPLEX 30 MIN: CPT | Mod: GO | Performed by: OCCUPATIONAL THERAPIST

## 2024-11-18 PROCEDURE — 94640 AIRWAY INHALATION TREATMENT: CPT | Mod: 76

## 2024-11-18 PROCEDURE — 97530 THERAPEUTIC ACTIVITIES: CPT | Mod: GO | Performed by: OCCUPATIONAL THERAPIST

## 2024-11-18 PROCEDURE — 83605 ASSAY OF LACTIC ACID: CPT | Performed by: HOSPITALIST

## 2024-11-18 PROCEDURE — 250N000009 HC RX 250: Performed by: HOSPITALIST

## 2024-11-18 PROCEDURE — 120N000013 HC R&B IMCU

## 2024-11-18 RX ORDER — BUDESONIDE 1 MG/2ML
1 INHALANT ORAL 2 TIMES DAILY
Status: DISCONTINUED | OUTPATIENT
Start: 2024-11-18 | End: 2024-11-24 | Stop reason: HOSPADM

## 2024-11-18 RX ADMIN — Medication 400 MG: at 09:29

## 2024-11-18 RX ADMIN — BUDESONIDE 1 MG: 1 INHALANT ORAL at 19:46

## 2024-11-18 RX ADMIN — IPRATROPIUM BROMIDE AND ALBUTEROL SULFATE 3 ML: .5; 3 SOLUTION RESPIRATORY (INHALATION) at 23:44

## 2024-11-18 RX ADMIN — DOXAZOSIN 4 MG: 2 TABLET ORAL at 20:54

## 2024-11-18 RX ADMIN — IPRATROPIUM BROMIDE AND ALBUTEROL SULFATE 3 ML: .5; 3 SOLUTION RESPIRATORY (INHALATION) at 19:45

## 2024-11-18 RX ADMIN — SENNOSIDES AND DOCUSATE SODIUM 2 TABLET: 50; 8.6 TABLET ORAL at 20:43

## 2024-11-18 RX ADMIN — IPRATROPIUM BROMIDE AND ALBUTEROL SULFATE 3 ML: .5; 3 SOLUTION RESPIRATORY (INHALATION) at 11:27

## 2024-11-18 RX ADMIN — METOPROLOL SUCCINATE 50 MG: 50 TABLET, EXTENDED RELEASE ORAL at 09:30

## 2024-11-18 RX ADMIN — PREDNISONE 40 MG: 20 TABLET ORAL at 09:29

## 2024-11-18 RX ADMIN — CALCIUM CARBONATE (ANTACID) CHEW TAB 500 MG 1000 MG: 500 CHEW TAB at 20:42

## 2024-11-18 RX ADMIN — AMLODIPINE BESYLATE 10 MG: 10 TABLET ORAL at 09:29

## 2024-11-18 RX ADMIN — IPRATROPIUM BROMIDE AND ALBUTEROL SULFATE 3 ML: .5; 3 SOLUTION RESPIRATORY (INHALATION) at 02:59

## 2024-11-18 RX ADMIN — IPRATROPIUM BROMIDE AND ALBUTEROL SULFATE 3 ML: .5; 3 SOLUTION RESPIRATORY (INHALATION) at 15:38

## 2024-11-18 RX ADMIN — LIDOCAINE 1 PATCH: 4 PATCH TOPICAL at 09:29

## 2024-11-18 RX ADMIN — LORATADINE 10 MG: 10 TABLET ORAL at 09:29

## 2024-11-18 RX ADMIN — IPRATROPIUM BROMIDE AND ALBUTEROL SULFATE 3 ML: .5; 3 SOLUTION RESPIRATORY (INHALATION) at 07:29

## 2024-11-18 RX ADMIN — SENNOSIDES AND DOCUSATE SODIUM 1 TABLET: 50; 8.6 TABLET ORAL at 09:30

## 2024-11-18 NOTE — PROGRESS NOTES
11/18/24 1015   Appointment Info   Signing Clinician's Name / Credentials (OT) Almaz Host, OTR/L   Living Environment   People in Home alone   Current Living Arrangements independent living facility   Home Accessibility wheelchair accessible   Living Environment Comments Pt lives at [facility] on independent living   Self-Care   Usual Activity Tolerance good   Current Activity Tolerance fair   Regular Exercise Yes   Activity/Exercise Type walking   Exercise Amount/Frequency daily   Equipment Currently Used at Home walker, rolling;wheelchair, manual   Activity/Exercise/Self-Care Comment Pt notes she typically uses walker around the apartment, and either gets wheeled to dining titus for meals or self-propels her manual wheelchair. Pt does not receive any additional assistance on a daily basis.   General Information   Onset of Illness/Injury or Date of Surgery 11/15/24   Referring Physician Soto Fernandes MD   Additional Occupational Profile Info/Pertinent History of Current Problem Angely Bryan is a 89 year old female with history of hypertension, hyperlipidemia, atrial fibrillation, CKD 3, CVA, GERD, ICH, breast cancer, low back pain, and COPD who presented to the ED via EMS with nearly 2 weeks worsening shortness of breath and productive cough.  She has had some ongoing rib pain from a fall several months ago but denies any new falls or chest pain today or in the recent days.  She has not had fevers or chills or vomiting but has had some nausea as of last night. She noted much worse breathing this morning in addition to wheezing and EMS was summoned. She received duonebs and solumedrol with EMS. She is breathing treated for COPD exacerbation   Existing Precautions/Restrictions fall;oxygen therapy device and L/min  (HFNC)   Cognitive Status Examination   Orientation Status orientation to person, place and time   Memory Deficit short-term memory;minimal deficit   Executive Function Deficit initiation   Pain  Assessment   Patient Currently in Pain No   Strength Comprehensive (MMT)   Comment, General Manual Muscle Testing (MMT) Assessment generalized weakness   Bed Mobility   Bed Mobility supine-sit   Supine-Sit [place] (Bed Mobility) contact guard   Assistive Device (Bed Mobility) bed rails   Activities of Daily Living   BADL Assessment/Intervention lower body dressing   Lower Body Dressing Assessment/Training   Position (Lower Body Dressing) long sitting   Chester Level (Lower Body Dressing) contact guard assist   Clinical Impression   Criteria for Skilled Therapeutic Interventions Met (OT) Yes, treatment indicated   OT Diagnosis decreased ADL independence, activity angelo   OT Problem List-Impairments impacting ADL problems related to;activity tolerance impaired;balance;mobility;cognition;strength   Assessment of Occupational Performance 1-3 Performance Deficits   Identified Performance Deficits dressing, functional mobility   Planned Therapy Interventions (OT) ADL retraining;progressive activity/exercise;transfer training;strengthening;cognition   Clinical Decision Making Complexity (OT) problem focused assessment/low complexity   Risk & Benefits of therapy have been explained patient   OT Total Evaluation Time   OT Eval, Low Complexity Minutes (90315) 10   OT Goals   Therapy Frequency (OT) 5 times/week   OT Predicted Duration/Target Date for Goal Attainment 11/25/24   OT Goals Aerobic Activity;Cognition;Hygiene/Grooming;Toilet Transfer/Toileting   OT: Hygiene/Grooming supervision/stand-by assist;while standing   OT: Toilet Transfer/Toileting Supervision/stand-by assist;toilet transfer;cleaning and garment management   OT: Cognitive Patient/caregiver will verbalize understanding of cognitive assessment results/recommendations as needed for safe discharge planning   OT: Perform aerobic activity with stable cardiovascular response continuous activity;5 minutes   Therapeutic Activities   Therapeutic Activity  Minutes (71793) 10   Symptoms noted during/after treatment fatigue;shortness of breath   Treatment Detail/Skilled Intervention Pt supine in bed, on HFNC, agreeable to session. Pt required VCs for initation of tasks, pt receptive and performed LB dressing task w/ CGA after VCs. VCs for safety w/ lines/tubes w/ bed mobility. Performed w/ CGA after cueing. Pt w/ shortness of breath upon EOB. O2 mid 80s on HFNC. Recovered w/ PLB technique and seated rest. O2 88-89% on HFNC. RN updated. Pt requesting to sit on EOB for breakfast, handoff to RN and bed alarm set. Session cut short d/t breakfast tray arriving. Pt set up on EOB, alarm on, call light in reach.   OT Discharge Planning   OT Plan progress to standing G/H w/ tray table, commode transfer, EC/PLB ed   OT Discharge Recommendation (DC Rec) Transitional Care Facility   OT Rationale for DC Rec Pt limited by decreased activity angelo, strength, O2 needs. Recommend TCU to increase independence, strength.   OT Brief overview of current status Goals of therapy will be to address safe mobility and make recs for d/c to next level of care. Pt and RN will continue to follow all falls risk precautions as documented by RN staff while hospitalized   Total Session Time   Timed Code Treatment Minutes 10   Total Session Time (sum of timed and untimed services) 20

## 2024-11-18 NOTE — PLAN OF CARE
Patient Name: Angely Bryan  MRN: [id number]  Date of Admission: 11/15/2024  Reason for Admission: Acute COPD exacerbation  Level of Care: [facility]     Vitals: Stable  Pain: Pain goal: 2, Pain Ratin left chest and back, Effective pain medication/regimen: PRN tylenol & flexeril, lidocaine patch, repositioning     CV Surgery Patient: No     Assessment     Resp: LS clear diminished, SOB with exertion.  HFNC 70% 40L (increased O2 needs with activity up to chair)  Telemetry: NSR w/ occ PVC's and PAC's, nonsustained PSVT run (30 beats), asx  Neuro: A/o x 4, CMS intact  Diet: Regular  GI/: Purewick in place for I\O, -BM, +BS, denies n/v  Skin/Wounds: scattered bruising  Lines/Drains: R PIV SL  Activity: A x 2 GB/walker, up to chair for dinner  Abnormal Labs: Creat 1.19     Aggression Stop Light: Green          Patient Care Plan: Continue plan of care: IV abx, nebs, IV steroids. Wean O2 as able. Pain management with muscle relaxer. Encourage activity as tolerated.        Goal Outcome Evaluation: progressing

## 2024-11-18 NOTE — PLAN OF CARE
Goal Outcome Evaluation:    Orientations: Aox4, forgetful  Vitals/Pain: VSS, Pt on HF @ 70%,40L, denies pain     Tele: SR w/ PAC's   Lines/Drains: RUE PIVx1  Skin/Wounds: Scattered bruising   GI/: Purewick in place for I&O, -BM, +BS, denies n/v   LS: Coarse/diminished   Ambulation/Assist: Ax2  Sleep Quality: Fair   Plan: Wean O2, steroids, abx's

## 2024-11-18 NOTE — PLAN OF CARE
Alert and oriented x4, forgetful. Vital signs stable on HFNC 70% 40L. Up with 1-2 GB/walker. Tolerating diet. Lungs sounds coarse, expiratory wheezes. Bowel sounds +. Passing flatus, BM -. Voiding adequately via purewick. Pain managed with Tylenol. Denies nausea. Neuro's and CMS intact. Tele SR. JACKIE RAHMAN.

## 2024-11-18 NOTE — PROGRESS NOTES
[facility]  Hospitalist Progress Note    Assessment & Plan   Angely Bryan is a 89 year old female with history of hypertension, hyperlipidemia, atrial fibrillation, CKD 3, CVA, GERD, ICH, breast cancer, low back pain, and COPD who was admitted on 11/15/24 for acute hypoxic respiratory failure likely 2/2 COPD exacerbation.      Acute Hypoxic Respiratory Failure 2/2 COPD Exacerbation  Hx of rib fracture  Occasionally uses 2-3L of home O2 when ill, lately using it over the past 2 weeks or so. Productive cough and orthopnea. Breathing this morning felt much worse than recent days and she has been somewhat nauseous. No fevers, chills, or vomiting. Chronic chest pain from prior rib fracture noted but no new chest pain. No fever at home. She has reportedly been on doxy for the past couple days. In the ED, she received duonebs and solumedrol. WBC 11.4, LA: 0.9, BNP: 385, D dimer 0.60, WNL for age adjustment. CXR without acute infiltrate, no signs of pulm edema. Needing up to 4L and subsequently on bipap in ED.    - She confirmed she is DNR DNI and would not want pre arrest intubation either (son present for discussion in ED)    - Spo2 > 90%   - Currently on HFNC    - Wean as able     - Continue scheduled and prn nebs  - Empiric abx given exacerbation, levofloxacin continued  - Continue steroid, solumedrol 60 bid for now x 2 days followed by prednisone 40 daily x 3 days   - Incentive spirometry     - Patient had an RRT on 11/17/24 for chest pain. It was likely MSK as pain was reproducible on palpation. No new fx or findings on CXR overnight, EKG and trop not ischemic appearing    - Pulmonary consulted      Atrial Fibrillation, persistent   Mildly tachycardic in ED. Not anticoagulated given history of intraparenchymal and intraventricular bleed while on anticoagulation previously.  - PTA metoprolol succ and ASA 81 to continue  - tele to continue given continued need for HF     CKD III   Creat 1.15 on  admission, appears at baseline or even slightly better  - Monitor     Hypertension  Hyperlipidemia  BP mildly elevated in ED.  - PTA amlodipine, metoprolol succ, and statin to continue     History of mild cognitive impairment  Was noted to have mild memory loss and some gait impairment which had been improving in the past year acting as below she does have history of intraparenchymal and intraventricular bleed while on anticoagulation for A-fib.  - MoCA 23/30 in April 2024  - Reorient as needed, encourage sleep-wake cycles  - OT consult     Hx of intraparenchymal and intraventricular bleed while on anticoagulation for a fib (December 2023)  Noted. Neurosurgery evaluated at that time in hospital. Bruno to have memory impairment and gait instability that was improving in past year or so.   - Not on AC, baby aspirin noted     Chronic low back pain  Appears at baseline.  - PTA regimen to continue    Clinically Significant Risk Factors         # Hyponatremia: Lowest Na = 134 mmol/L in last 2 days, will monitor as appropriate           # Hypertension: Noted on problem list     # Acute Hypercapnic Respiratory Failure: based on venous blood gas results.  Continue supplemental oxygen and ventilatory support as indicated.             # Financial/Environmental Concerns: none           Diet: Regular Diet Adult     DVT Prophylaxis: Pneumatic Compression Devices   Tuttle Catheter: Not present  Lines: None     Cardiac Monitoring: ACTIVE order. Indication: copd exacerbation, hx a fib, IMC  Code Status: No CPR- Do NOT Intubate      Disposition Plan       Expected Discharge Date: 11/20/2024,  3:00 PM    Destination: long-term care facility        Entered: Maribel Godwin MD 11/18/2024, 1:47 PM     Notes Reviewed: Previous notes    Care Team Updated: Bedside nursing updated     Disposition: Likely 2-4 days pending oxygen requirements       Medically Ready for Discharge: Anticipated in 2-4 Days        Maribel Godwin  MD  Hospitalist Service   [facility]  Securely message with the Vocera Web Console (learn more here)         Medical Decision Making       60 MINUTES SPENT BY ME on the date of service doing chart review, history, exam, documentation & further activities per the note.           Interval History     No acute overnight events.    This morning the patient states that she is doing okay. States that her breathing feels better when she has oxygen on. When it slips off she feels SOB.     -Data reviewed today: I reviewed all new labs and imaging results over the last 24 hours.     Physical Exam   Temp: 99  F (37.2  C) Temp src: Oral BP: 136/86 Pulse: 64   Resp: 14 SpO2: 90 % O2 Device: High Flow Nasal Cannula (HFNC) Oxygen Delivery: 35 LPM  Vitals:    11/15/24 0732 11/15/24 1809 11/18/24 0542   Weight: 58.5 kg (129 lb) 60.8 kg (134 lb 0.6 oz) 62.1 kg (136 lb 12.8 oz)     Vital Signs with Ranges  Temp:  [97.7  F (36.5  C)-99.1  F (37.3  C)] 99  F (37.2  C)  Pulse:  [53-78] 64  Resp:  [10-24] 14  BP: ()/(54-86) 136/86  FiO2 (%):  [40 %-85 %] 50 %  SpO2:  [83 %-98 %] 90 %  I/O last 3 completed shifts:  In: 200 [P.O.:200]  Out: 200 [Urine:200]      Constitutional: Awake, alert, cooperative, no apparent distress.    HEENT: PERRL, Normocephalic, without obvious abnormality, atraumatic, oral pharynx with moist mucus membranes  Pulmonary: Bilateral wheezing  Cardiovascular: Regular rate and rhythm, normal S1 and S2  GI: Normal bowel sounds, soft, non-distended, non-tender.  Skin/Integumen: Visualized skin appeared clear.  Neuro: CN II-XII grossly intact.  Psych:  Alert and oriented x 3.   Extremities: No lower extremity edema noted      Medications   Current Facility-Administered Medications   Medication Dose Route Frequency Provider Last Rate Last Admin    No lozenges or gum should be given while patient on BIPAP/AVAPS/AVAPS AE   Does not apply Continuous PRN Le Nguyen MD        Patient may  continue current oral medications   Does not apply Continuous PRN Le Nguyen MD         Current Facility-Administered Medications   Medication Dose Route Frequency Provider Last Rate Last Admin    amLODIPine (NORVASC) tablet 10 mg  10 mg Oral Daily Soto Fernandes MD   10 mg at 11/18/24 0929    doxazosin (CARDURA) tablet 4 mg  4 mg Oral At Bedtime Soto Fernandes MD   4 mg at 11/17/24 2257    ipratropium - albuterol 0.5 mg/2.5 mg/3 mL (DUONEB) neb solution 3 mL  3 mL Nebulization Q4H Soto Fernandes MD   3 mL at 11/18/24 1127    levofloxacin (LEVAQUIN) infusion 500 mg  500 mg Intravenous Q48H Soto Fernandes  mL/hr at 11/17/24 1013 500 mg at 11/17/24 1013    Lidocaine (LIDOCARE) 4 % Patch 1 patch  1 patch Transdermal Q24H Soto Fernandes MD   1 patch at 11/18/24 0929    loratadine (CLARITIN) tablet 10 mg  10 mg Oral Daily Soto Fernandes MD   10 mg at 11/18/24 0929    magnesium oxide (MAG-OX) tablet 400 mg  400 mg Oral Daily Soto Fernandes MD   400 mg at 11/18/24 0929    metoprolol succinate ER (TOPROL XL) 24 hr tablet 50 mg  50 mg Oral Daily Soto Fernandes MD   50 mg at 11/18/24 0930    predniSONE (DELTASONE) tablet 40 mg  40 mg Oral Daily Soto Fernandes MD   40 mg at 11/18/24 0929    senna-docusate (SENOKOT-S/PERICOLACE) 8.6-50 MG per tablet 1 tablet  1 tablet Oral BID Soto Fernandes MD   1 tablet at 11/18/24 0930    Or    senna-docusate (SENOKOT-S/PERICOLACE) 8.6-50 MG per tablet 2 tablet  2 tablet Oral BID Soto Fernandes MD   2 tablet at 11/17/24 2016    sodium chloride (PF) 0.9% PF flush 3 mL  3 mL Intracatheter Q8H Soto Fernandes MD   3 mL at 11/18/24 0930       Data   Recent Labs   Lab 11/18/24  0636 11/17/24  0502 11/16/24  0503 11/15/24  0730   WBC 15.1*  --  13.5* 11.4*   HGB 11.7  --  11.3* 12.2   MCV 94  --  95 97     --  188 185   * 134*  134* 136 137   POTASSIUM 4.9 4.8  4.8 5.1 4.3   CHLORIDE 105  102  102 103 103   CO2 23 24  24 24 26   BUN 35.9* 33.1*  33.1* 27.3* 20.5   CR 1.24* 1.19*  1.19* 1.22* 1.15*   ANIONGAP 6* 8  8 9 8   CHANDNI 9.4 9.6  9.6 9.6 9.5   * 147*  147* 128* 114*   ALBUMIN  --  3.8  --  4.0   PROTTOTAL  --  6.9  --  7.2   BILITOTAL  --  0.2  --  0.2   ALKPHOS  --  72  --  82   ALT  --  13  --  10   AST  --  26  --  18   LIPASE  --  16  --   --        No results found for this or any previous visit (from the past 24 hours).

## 2024-11-18 NOTE — CONSULTS
PULMONARY CONSULT  Date of service: 2024    Patient: Angely Bryan      : 10/20/1931      MRN: [id number]          Impressions/Recommendations:     #COPd:  -Agree with current management.  -Added ernestine Marquez to follow.    Сергей Bolton MD  Pulmonary & Critical Care            History of Present Illness:     Angely Bryan is a 89 year old female with history of hypertension, hyperlipidemia, atrial fibrillation, CKD 3, CVA, GERD, ICH, breast cancer, low back pain, and COPD who was admitted on 11/15/24 for acute hypoxic respiratory failure likely 2/2 COPD exacerbation.         Review of Symptoms:   10-point ROS reviewed, & found negative w/ exceptions noted in the HPI.          Past Medical History:     Past Medical History:   Diagnosis Date    Arthritis     Cancer (H)     breast, left mastectomy    Chronic anticoagulation     COPD (chronic obstructive pulmonary disease) (H)     CVA (cerebral infarction) 7/3-    Left sided CVA    GERD (gastroesophageal reflux disease)     Hypertension     Osteopenia 2011    T score of -2.3 in hip    Paroxysmal atrial fibrillation (H)     Peptic ulcer     Unspecified cerebral artery occlusion with cerebral infarction     Vitamin B deficiency 2015    Vitamin D deficiency        Past Surgical History:   Procedure Laterality Date    appendectomy      ENDARTERECTOMY CAROTID Left 2015    Procedure: ENDARTERECTOMY CAROTID;  Surgeon: Arnold Abel MD;  Location: [facility] OR    MASTECTOMY Left 10/20/1974    ORTHOPEDIC SURGERY  2005    Lumbar Fusion    ORTHOPEDIC SURGERY      Knee Arthroscopy            Allergies:     Allergies   Allergen Reactions    Losartan Swelling     Tongue swelling    Penicillins Rash     Tongue swelling              Outpatient Medications:     Current Facility-Administered Medications   Medication Dose Route Frequency Provider Last Rate Last Admin    acetaminophen (TYLENOL) tablet 650 mg  650 mg Oral Q4H PRN Soto Fernandes  MD Pedro   650 mg at 11/17/24 2258    Or    acetaminophen (TYLENOL) Suppository 650 mg  650 mg Rectal Q4H PRN Soto Fernandes MD        albuterol (PROVENTIL) neb solution 2.5 mg  2.5 mg Nebulization Q2H PRN Soto Fernandes MD        alum & mag hydroxide-simethicone (MAALOX) suspension 15 mL  15 mL Oral TID PRN Baron Mckeon, APRN CNP        amLODIPine (NORVASC) tablet 10 mg  10 mg Oral Daily Soto Fernandes MD   10 mg at 11/18/24 0929    bisacodyl (DULCOLAX) suppository 10 mg  10 mg Rectal Daily PRN Soto Fernandes MD        budesonide (PULMICORT) neb solution 1 mg  1 mg Nebulization BID Сергей Bolton MD        calcium carbonate (TUMS) chewable tablet 1,000 mg  1,000 mg Oral 4x Daily PRN Soto Fernandes MD        carboxymethylcellulose PF (REFRESH PLUS) 0.5 % ophthalmic solution 1 drop  1 drop Both Eyes Q1H PRN Le Nguyen MD        cyclobenzaprine (FLEXERIL) tablet 5 mg  5 mg Oral Q8H PRN Baron Mckeon, APRN CNP   5 mg at 11/17/24 1358    doxazosin (CARDURA) tablet 4 mg  4 mg Oral At Bedtime Soto Fernandes MD   4 mg at 11/17/24 2257    guaiFENesin (ROBITUSSIN) 20 mg/mL solution 200 mg  200 mg Oral Q4H PRN Soto Fernandes MD        guaiFENesin-codeine (ROBITUSSIN AC) 100-10 MG/5ML solution 5 mL  5 mL Oral At Bedtime PRN Soto Fernandes MD        ipratropium - albuterol 0.5 mg/2.5 mg/3 mL (DUONEB) neb solution 3 mL  3 mL Nebulization Q4H Soto Fernandes MD   3 mL at 11/18/24 1538    levofloxacin (LEVAQUIN) infusion 500 mg  500 mg Intravenous Q48H Soto Fernandes  mL/hr at 11/17/24 1013 500 mg at 11/17/24 1013    Lidocaine (LIDOCARE) 4 % Patch 1 patch  1 patch Transdermal Q24H Soto Fernandes MD   1 patch at 11/18/24 0929    lidocaine (LMX4) cream   Topical Q1H PRN Soto Fernandes MD        lidocaine (LMX4) cream   Topical Q1H PRSoto Isaacs MD        lidocaine 1 % 0.1-1 mL  0.1-1 mL Other Q1H PRSoto Isaacs  MD Pedro        lidocaine 1 % 0.1-1 mL  0.1-1 mL Other Q1H PRN Soto Fernandes MD        loratadine (CLARITIN) tablet 10 mg  10 mg Oral Daily Soto Fernandes MD   10 mg at 11/18/24 0929    magnesium oxide (MAG-OX) tablet 400 mg  400 mg Oral Daily Soto Fernandes MD   400 mg at 11/18/24 0929    metoprolol (LOPRESSOR) injection 5 mg  5 mg Intravenous Q4H PRN Baron Mckeon APRN CNP        metoprolol succinate ER (TOPROL XL) 24 hr tablet 50 mg  50 mg Oral Daily Soto Fernandes MD   50 mg at 11/18/24 0930    nitroGLYcerin (NITROSTAT) sublingual tablet 0.4 mg  0.4 mg Sublingual Q5 Min PRN Baron Mckeon APRN CNP   0.4 mg at 11/17/24 0422    No lozenges or gum should be given while patient on BIPAP/AVAPS/AVAPS AE   Does not apply Continuous PRN Le Nguyen MD        ondansetron (ZOFRAN ODT) ODT tab 4 mg  4 mg Oral Q6H PRN Soto Fernandes MD        Or    ondansetron (ZOFRAN) injection 4 mg  4 mg Intravenous Q6H PRN Soto Fernandes MD   4 mg at 11/17/24 0444    Patient may continue current oral medications   Does not apply Continuous PRN Le Nguyen MD        polyethylene glycol (MIRALAX) Packet 17 g  17 g Oral BID PRN Soto Fernandes MD        predniSONE (DELTASONE) tablet 40 mg  40 mg Oral Daily Soto Fernandes MD   40 mg at 11/18/24 0929    senna-docusate (SENOKOT-S/PERICOLACE) 8.6-50 MG per tablet 1 tablet  1 tablet Oral BID Soto Fernandes MD   1 tablet at 11/18/24 0930    Or    senna-docusate (SENOKOT-S/PERICOLACE) 8.6-50 MG per tablet 2 tablet  2 tablet Oral BID Soto Fernandes MD   2 tablet at 11/17/24 2016    sodium chloride (PF) 0.9% PF flush 3 mL  3 mL Intracatheter Q8H Soto Fernandes MD   3 mL at 11/18/24 0930    sodium chloride (PF) 0.9% PF flush 3 mL  3 mL Intracatheter q1 min prn Soto Fernandes MD        sodium chloride (PF) 0.9% PF flush 3 mL  3 mL Intracatheter q1 min prSoto Alvarado MD                  "Family History:     Family History   Problem Relation Age of Onset    Diabetes Mother     Hypertension Mother     Pulmonary Embolism Father     Cancer Sister     Arrhythmia Sister     Arrhythmia Son                Social History:     Social History     Tobacco Use    Smoking status: Former     Current packs/day: 0.00     Average packs/day: 2.0 packs/day for 40.0 years (80.0 ttl pk-yrs)     Types: Cigarettes     Start date: 10/22/1951     Quit date: 10/22/1991     Years since quittin.0   Substance Use Topics    Alcohol use: Yes     Comment: 2 glasses wine /week    Drug use: No             Physical Exam:   /66 (BP Location: Right arm)   Pulse 59   Temp 98.6  F (37  C) (Oral)   Resp 18   Ht 1.626 m (5' 4\")   Wt 62.1 kg (136 lb 12.8 oz)   SpO2 93%   BMI 23.48 kg/m      General: NAD  HEENT: Anicteric sclera, EOMI, MMM, OP unobstructed, w/o erythema/discharge; no cervical LAD  CV: RRR, no m/r/g  Lungs: Poor air entry.   Abd: Soft, NT, ND  Ext: WWP,  No LE edema  Skin: No rashes, cyanosis, or jaundice  Neuro: AAOx3, no focal deficits          Data:           "

## 2024-11-19 LAB
ANION GAP SERPL CALCULATED.3IONS-SCNC: 8 MMOL/L (ref 7–15)
ATRIAL RATE - MUSE: 182 BPM
BUN SERPL-MCNC: 34 MG/DL (ref 8–23)
CALCIUM SERPL-MCNC: 9.5 MG/DL (ref 8.8–10.4)
CHLORIDE SERPL-SCNC: 102 MMOL/L (ref 98–107)
CREAT SERPL-MCNC: 1.13 MG/DL (ref 0.51–0.95)
DIASTOLIC BLOOD PRESSURE - MUSE: NORMAL MMHG
EGFRCR SERPLBLD CKD-EPI 2021: 45 ML/MIN/1.73M2
ERYTHROCYTE [DISTWIDTH] IN BLOOD BY AUTOMATED COUNT: 12.2 % (ref 10–15)
GLUCOSE SERPL-MCNC: 95 MG/DL (ref 70–99)
HCO3 SERPL-SCNC: 25 MMOL/L (ref 22–29)
HCT VFR BLD AUTO: 38.7 % (ref 35–47)
HGB BLD-MCNC: 12.4 G/DL (ref 11.7–15.7)
INTERPRETATION ECG - MUSE: NORMAL
MAGNESIUM SERPL-MCNC: 2 MG/DL (ref 1.7–2.3)
MCH RBC QN AUTO: 30.5 PG (ref 26.5–33)
MCHC RBC AUTO-ENTMCNC: 32 G/DL (ref 31.5–36.5)
MCV RBC AUTO: 95 FL (ref 78–100)
P AXIS - MUSE: NORMAL DEGREES
PLATELET # BLD AUTO: 176 10E3/UL (ref 150–450)
POTASSIUM SERPL-SCNC: 4.2 MMOL/L (ref 3.4–5.3)
PR INTERVAL - MUSE: NORMAL MS
QRS DURATION - MUSE: 98 MS
QT - MUSE: 306 MS
QTC - MUSE: 485 MS
R AXIS - MUSE: -36 DEGREES
RBC # BLD AUTO: 4.06 10E6/UL (ref 3.8–5.2)
SODIUM SERPL-SCNC: 135 MMOL/L (ref 135–145)
SYSTOLIC BLOOD PRESSURE - MUSE: NORMAL MMHG
T AXIS - MUSE: 115 DEGREES
TROPONIN T SERPL HS-MCNC: 20 NG/L
TROPONIN T SERPL HS-MCNC: 21 NG/L
VENTRICULAR RATE- MUSE: 151 BPM
WBC # BLD AUTO: 15.7 10E3/UL (ref 4–11)

## 2024-11-19 PROCEDURE — 94640 AIRWAY INHALATION TREATMENT: CPT

## 2024-11-19 PROCEDURE — 99222 1ST HOSP IP/OBS MODERATE 55: CPT | Performed by: INTERNAL MEDICINE

## 2024-11-19 PROCEDURE — 258N000003 HC RX IP 258 OP 636: Performed by: STUDENT IN AN ORGANIZED HEALTH CARE EDUCATION/TRAINING PROGRAM

## 2024-11-19 PROCEDURE — 999N000157 HC STATISTIC RCP TIME EA 10 MIN

## 2024-11-19 PROCEDURE — 36415 COLL VENOUS BLD VENIPUNCTURE: CPT | Performed by: STUDENT IN AN ORGANIZED HEALTH CARE EDUCATION/TRAINING PROGRAM

## 2024-11-19 PROCEDURE — 250N000013 HC RX MED GY IP 250 OP 250 PS 637: Performed by: STUDENT IN AN ORGANIZED HEALTH CARE EDUCATION/TRAINING PROGRAM

## 2024-11-19 PROCEDURE — 85027 COMPLETE CBC AUTOMATED: CPT | Performed by: STUDENT IN AN ORGANIZED HEALTH CARE EDUCATION/TRAINING PROGRAM

## 2024-11-19 PROCEDURE — 250N000009 HC RX 250

## 2024-11-19 PROCEDURE — 93010 ELECTROCARDIOGRAM REPORT: CPT | Mod: XP | Performed by: INTERNAL MEDICINE

## 2024-11-19 PROCEDURE — 99291 CRITICAL CARE FIRST HOUR: CPT

## 2024-11-19 PROCEDURE — 250N000009 HC RX 250: Performed by: INTERNAL MEDICINE

## 2024-11-19 PROCEDURE — 250N000011 HC RX IP 250 OP 636: Performed by: HOSPITALIST

## 2024-11-19 PROCEDURE — 250N000013 HC RX MED GY IP 250 OP 250 PS 637: Performed by: INTERNAL MEDICINE

## 2024-11-19 PROCEDURE — 250N000009 HC RX 250: Performed by: HOSPITALIST

## 2024-11-19 PROCEDURE — 258N000003 HC RX IP 258 OP 636

## 2024-11-19 PROCEDURE — 94640 AIRWAY INHALATION TREATMENT: CPT | Mod: 76

## 2024-11-19 PROCEDURE — 250N000011 HC RX IP 250 OP 636

## 2024-11-19 PROCEDURE — 84132 ASSAY OF SERUM POTASSIUM: CPT | Performed by: STUDENT IN AN ORGANIZED HEALTH CARE EDUCATION/TRAINING PROGRAM

## 2024-11-19 PROCEDURE — 250N000013 HC RX MED GY IP 250 OP 250 PS 637: Performed by: HOSPITALIST

## 2024-11-19 PROCEDURE — 36415 COLL VENOUS BLD VENIPUNCTURE: CPT

## 2024-11-19 PROCEDURE — 99233 SBSQ HOSP IP/OBS HIGH 50: CPT | Performed by: STUDENT IN AN ORGANIZED HEALTH CARE EDUCATION/TRAINING PROGRAM

## 2024-11-19 PROCEDURE — 93010 ELECTROCARDIOGRAM REPORT: CPT | Performed by: INTERNAL MEDICINE

## 2024-11-19 PROCEDURE — 93005 ELECTROCARDIOGRAM TRACING: CPT

## 2024-11-19 PROCEDURE — 250N000011 HC RX IP 250 OP 636: Performed by: STUDENT IN AN ORGANIZED HEALTH CARE EDUCATION/TRAINING PROGRAM

## 2024-11-19 PROCEDURE — 80048 BASIC METABOLIC PNL TOTAL CA: CPT | Performed by: STUDENT IN AN ORGANIZED HEALTH CARE EDUCATION/TRAINING PROGRAM

## 2024-11-19 PROCEDURE — 84484 ASSAY OF TROPONIN QUANT: CPT

## 2024-11-19 PROCEDURE — 120N000013 HC R&B IMCU

## 2024-11-19 PROCEDURE — 83735 ASSAY OF MAGNESIUM: CPT | Performed by: STUDENT IN AN ORGANIZED HEALTH CARE EDUCATION/TRAINING PROGRAM

## 2024-11-19 PROCEDURE — 250N000012 HC RX MED GY IP 250 OP 636 PS 637: Performed by: HOSPITALIST

## 2024-11-19 RX ORDER — DIGOXIN 125 MCG
125 TABLET ORAL 2 TIMES DAILY
Status: DISCONTINUED | OUTPATIENT
Start: 2024-11-19 | End: 2024-11-19

## 2024-11-19 RX ORDER — LEVALBUTEROL INHALATION SOLUTION 1.25 MG/3ML
1.25 SOLUTION RESPIRATORY (INHALATION)
Status: DISCONTINUED | OUTPATIENT
Start: 2024-11-19 | End: 2024-11-24 | Stop reason: HOSPADM

## 2024-11-19 RX ORDER — DIGOXIN 0.25 MG/ML
125 INJECTION INTRAMUSCULAR; INTRAVENOUS ONCE
Status: COMPLETED | OUTPATIENT
Start: 2024-11-19 | End: 2024-11-19

## 2024-11-19 RX ORDER — DILTIAZEM HYDROCHLORIDE 60 MG/1
60 CAPSULE, EXTENDED RELEASE ORAL 2 TIMES DAILY
Status: DISCONTINUED | OUTPATIENT
Start: 2024-11-19 | End: 2024-11-20

## 2024-11-19 RX ORDER — DILTIAZEM HYDROCHLORIDE 5 MG/ML
10 INJECTION INTRAVENOUS ONCE
Status: COMPLETED | OUTPATIENT
Start: 2024-11-19 | End: 2024-11-19

## 2024-11-19 RX ADMIN — ACETAMINOPHEN 650 MG: 325 TABLET, FILM COATED ORAL at 21:07

## 2024-11-19 RX ADMIN — BUDESONIDE 1 MG: 1 INHALANT ORAL at 19:14

## 2024-11-19 RX ADMIN — LEVOFLOXACIN 500 MG: 5 INJECTION, SOLUTION INTRAVENOUS at 08:47

## 2024-11-19 RX ADMIN — DIGOXIN 125 MCG: 0.25 INJECTION INTRAMUSCULAR; INTRAVENOUS at 13:54

## 2024-11-19 RX ADMIN — DILTIAZEM HYDROCHLORIDE 60 MG: 60 CAPSULE, EXTENDED RELEASE ORAL at 13:16

## 2024-11-19 RX ADMIN — AMIODARONE HYDROCHLORIDE 1 MG/MIN: 50 INJECTION, SOLUTION INTRAVENOUS at 03:35

## 2024-11-19 RX ADMIN — LIDOCAINE 1 PATCH: 4 PATCH TOPICAL at 08:47

## 2024-11-19 RX ADMIN — IPRATROPIUM BROMIDE AND ALBUTEROL SULFATE 3 ML: .5; 3 SOLUTION RESPIRATORY (INHALATION) at 15:28

## 2024-11-19 RX ADMIN — IPRATROPIUM BROMIDE AND ALBUTEROL SULFATE 3 ML: .5; 3 SOLUTION RESPIRATORY (INHALATION) at 03:37

## 2024-11-19 RX ADMIN — PREDNISONE 40 MG: 20 TABLET ORAL at 08:47

## 2024-11-19 RX ADMIN — AMIODARONE HYDROCHLORIDE 150 MG: 1.5 INJECTION, SOLUTION INTRAVENOUS at 03:21

## 2024-11-19 RX ADMIN — METOPROLOL TARTRATE 5 MG: 5 INJECTION INTRAVENOUS at 22:04

## 2024-11-19 RX ADMIN — DOXAZOSIN 4 MG: 2 TABLET ORAL at 21:07

## 2024-11-19 RX ADMIN — SENNOSIDES AND DOCUSATE SODIUM 2 TABLET: 50; 8.6 TABLET ORAL at 21:06

## 2024-11-19 RX ADMIN — METOPROLOL TARTRATE 5 MG: 5 INJECTION INTRAVENOUS at 01:40

## 2024-11-19 RX ADMIN — IPRATROPIUM BROMIDE AND ALBUTEROL SULFATE 3 ML: .5; 3 SOLUTION RESPIRATORY (INHALATION) at 19:11

## 2024-11-19 RX ADMIN — LORATADINE 10 MG: 10 TABLET ORAL at 08:48

## 2024-11-19 RX ADMIN — AMLODIPINE BESYLATE 10 MG: 10 TABLET ORAL at 08:48

## 2024-11-19 RX ADMIN — DIGOXIN 125 MCG: 0.25 INJECTION INTRAMUSCULAR; INTRAVENOUS at 14:49

## 2024-11-19 RX ADMIN — ACETAMINOPHEN 650 MG: 325 TABLET, FILM COATED ORAL at 08:46

## 2024-11-19 RX ADMIN — IPRATROPIUM BROMIDE AND ALBUTEROL SULFATE 3 ML: .5; 3 SOLUTION RESPIRATORY (INHALATION) at 08:11

## 2024-11-19 RX ADMIN — SODIUM CHLORIDE 250 ML: 9 INJECTION, SOLUTION INTRAVENOUS at 13:32

## 2024-11-19 RX ADMIN — DILTIAZEM HYDROCHLORIDE 10 MG: 5 INJECTION, SOLUTION INTRAVENOUS at 02:21

## 2024-11-19 RX ADMIN — Medication 400 MG: at 08:48

## 2024-11-19 RX ADMIN — METOPROLOL SUCCINATE 50 MG: 50 TABLET, EXTENDED RELEASE ORAL at 08:48

## 2024-11-19 RX ADMIN — BUDESONIDE 1 MG: 1 INHALANT ORAL at 08:12

## 2024-11-19 RX ADMIN — SENNOSIDES AND DOCUSATE SODIUM 2 TABLET: 50; 8.6 TABLET ORAL at 08:47

## 2024-11-19 RX ADMIN — IPRATROPIUM BROMIDE AND ALBUTEROL SULFATE 3 ML: .5; 3 SOLUTION RESPIRATORY (INHALATION) at 11:34

## 2024-11-19 NOTE — SIGNIFICANT EVENT
Significant Event Note    Time of event: 1:58 AM November 19, 2024    Description of event:  Per nursing staff, PSVT run 26b then went into afib rvr. Given Metoprolol 2.5mg. asymptomatic still in Afib with 100s/70s    Plan:  Continue telemetry  Give another 2.5mg metoprolol>still in RVR, will give Dilt 10mg once. > failed will start benoito    Tam Moran MD

## 2024-11-19 NOTE — PROGRESS NOTES
[facility]  Hospitalist Progress Note    Assessment & Plan   Angely Bryan is a 89 year old female with history of hypertension, hyperlipidemia, atrial fibrillation, CKD 3, CVA, GERD, ICH, breast cancer, low back pain, and COPD who was admitted on 11/15/24 for acute hypoxic respiratory failure likely 2/2 COPD exacerbation.      Acute Hypoxic Respiratory Failure 2/2 COPD Exacerbation  Hx of rib fracture  Occasionally uses 2-3L of home O2 when ill, lately using it over the past 2 weeks or so. Productive cough and orthopnea. Breathing this morning felt much worse than recent days and she has been somewhat nauseous. No fevers, chills, or vomiting. Chronic chest pain from prior rib fracture noted but no new chest pain. No fever at home. She has reportedly been on doxy for the past couple days. In the ED, she received duonebs and solumedrol. WBC 11.4, LA: 0.9, BNP: 385, D dimer 0.60, WNL for age adjustment. CXR without acute infiltrate, no signs of pulm edema. Needing up to 4L and subsequently on bipap in ED.     - She confirmed she is DNR DNI and would not want pre arrest intubation either (son present for discussion in ED)     - Spo2 > 90%               - Currently on HFNC                - Wean as able      - Continue scheduled and prn nebs  - Empiric abx given exacerbation, levofloxacin continued  - Continue steroid, solumedrol 60 bid for now x 2 days followed by prednisone 40 daily x 3 days   - Incentive spirometry      - Patient had an RRT on 11/17/24 for chest pain. It was likely MSK as pain was reproducible on palpation. No new fx or findings on CXR overnight, EKG and trop not ischemic appearing     - Pulmonary consulted     - Added budesonide nebs    - If she continues to have hypoxemia then may consider CT PE    Atrial Fibrillation, persistent   PSVT  Mildly tachycardic in ED. Not anticoagulated given history of intraparenchymal and intraventricular bleed while on anticoagulation  previously. Early morning on 11/19/24, patient had a 26 beat run of PSVT followed by Afib with RVR. Patient was asymptomatic. Was given 2.5mg IV Metoprolol with no improvement. She was given another 2.5mg IV Metoprolol followed by 10mg IV Dilt with no improvement. She was started on Amiodarone ggt. On 11/19/24 morning, patient continued to have elevated HR in the 130-140s. Cardiology consulted and recommended PO Diltiazem. During the afternoon on 11/19/24, patient had hypotension with increased HR of 140s. RRT was called. Initial plan was so cardiovert given unstability. Patient is DNI/DNR. Patient was not sure with cardioversion. Was given 125mcg IV Digoxin with minimal improvement in HR but some improvement with BP. House team had extensive goals of care conversation with patient and family but patient still uncertain with cardioversion. House team giving another 125mcg of digoxin.     - Tele     - Continue home Metoprolol with hold parameters     - Hold Dilt given low Bps     - After speaking with Pharmacy unable to schedule further digoxin as she received 250mcg today along with Amiodarone given her creatinine clearance it can lead to toxicity. If Digoxin is needed they recommend 0.0625mcg every 48 hours. Will continue to monitor for now. If rates and BP continue to be an issue then will need to touch base with Cardiology for further guidance as patient is unsure about cardioversion at this time.     - Cardiology consulted and following      Goals of Care  Attempted to discuss GOC with patient during rounds on 11/19/24. She stated that she would need to talk to her family first. She is unclear what she would want moving forward. She confirms DNR/DNI status.     CKD III   Creat 1.15 on admission, appears at baseline or even slightly better  - Monitor     Hypertension  Hyperlipidemia  BP mildly elevated in ED.  - PTA amlodipine, metoprolol succ, and statin to continue     History of mild cognitive impairment  Was  noted to have mild memory loss and some gait impairment which had been improving in the past year acting as below she does have history of intraparenchymal and intraventricular bleed while on anticoagulation for A-fib.  - MoCA 23/30 in April 2024  - Reorient as needed, encourage sleep-wake cycles  - OT consult     Hx of intraparenchymal and intraventricular bleed while on anticoagulation for a fib (December 2023)  Noted. Neurosurgery evaluated at that time in hospital. [place] to have memory impairment and gait instability that was improving in past year or so.   - Not on AC, baby aspirin noted     Chronic low back pain  Appears at baseline.  - PTA regimen to continue    Clinically Significant Risk Factors         # Hyponatremia: Lowest Na = 134 mmol/L in last 2 days, will monitor as appropriate           # Hypertension: Noted on problem list     # Acute Hypercapnic Respiratory Failure: based on venous blood gas results.  Continue supplemental oxygen and ventilatory support as indicated.             # Financial/Environmental Concerns: none           Diet: Regular Diet Adult     DVT Prophylaxis: Pneumatic Compression Devices   Tuttle Catheter: Not present  Lines: None     Cardiac Monitoring: ACTIVE order. Indication: copd exacerbation, hx a fib, IMC  Code Status: No CPR- Do NOT Intubate      Disposition Plan       Expected Discharge Date: 11/21/2024,  3:00 PM    Destination: long-term care facility        Entered: Maribel Godwin MD 11/19/2024, 2:45 PM     Notes Reviewed: Cardiology, spoke to RRT team    Care Team Updated: Nursing updated     Disposition: Likely 2-3 days pending oxygen requirements and HR control along with GOC discussions       Medically Ready for Discharge: Anticipated in 2-4 Days        Maribel Godwin MD  Hospitalist Service   [facility]  Securely message with the Vocera Web Console (learn more here)         Medical Decision Making       50 MINUTES SPENT BY ME on  the date of service doing chart review, history, exam, documentation & further activities per the note.           Interval History     Early morning on 11/19/24, patient had a 26 beat run of PSVT followed by Afib with RVR. Patient was asymptomatic. Was given 2.5mg IV Metoprolol with no improvement. She was given another 2.5mg IV Metoprolol followed by 10mg IV Dilt with no improvement. She was started on Amiodarone ggt. On 11/19/24 morning, patient continued to have elevated HR in the 130-140s.     This morning the patient states that she is doing well. Does not feel her heart racing. Feels that her breathing is stable today despite needing high flow.     -Data reviewed today: I reviewed all new labs and imaging results over the last 24 hours.     Physical Exam   Temp: 97.5  F (36.4  C) Temp src: Oral BP: (!) 88/66 Pulse: (!) 141   Resp: 14 SpO2: 95 % O2 Device: High Flow Nasal Cannula (HFNC) Oxygen Delivery: 40 LPM  Vitals:    11/15/24 1809 11/18/24 0542 11/19/24 0606   Weight: 60.8 kg (134 lb 0.6 oz) 62.1 kg (136 lb 12.8 oz) 59.3 kg (130 lb 11.7 oz)     Vital Signs with Ranges  Temp:  [97.4  F (36.3  C)-98.6  F (37  C)] 97.5  F (36.4  C)  Pulse:  [] 141  Resp:  [10-60] 14  BP: ()/() 88/66  FiO2 (%):  [50 %-75 %] 60 %  SpO2:  [89 %-98 %] 95 %  I/O last 3 completed shifts:  In: -   Out: 1400 [Urine:1400]      Constitutional: Awake, alert, cooperative, no apparent distress.    HEENT: PERRL, Normocephalic, without obvious abnormality, atraumatic, oral pharynx with moist mucus membranes  Pulmonary: Bilateral wheezing  Cardiovascular: Irregular rate and rhythm, tachycardia, normal S1 and S2  GI: Normal bowel sounds, soft, non-distended, non-tender.  Skin/Integumen: Visualized skin appeared clear.  Neuro: CN II-XII grossly intact.  Psych:  Alert and oriented x 3.   Extremities: No lower extremity edema noted      Medications   Current Facility-Administered Medications   Medication Dose Route Frequency  Provider Last Rate Last Admin    amiodarone (CORDARONE) 1.8 mg/mL in sodium chloride 0.9% 500 mL ADULT STANDARD infusion  0.5 mg/min Intravenous Continuous Tam Moran MD   Paused at 11/19/24 1247    No lozenges or gum should be given while patient on BIPAP/AVAPS/AVAPS AE   Does not apply Continuous PRN Le Nguyen MD        Patient may continue current oral medications   Does not apply Continuous PRN Le Nguyen MD         Current Facility-Administered Medications   Medication Dose Route Frequency Provider Last Rate Last Admin    budesonide (PULMICORT) neb solution 1 mg  1 mg Nebulization BID Сергей Bolton MD   1 mg at 11/19/24 0812    digoxin (LANOXIN) injection 125 mcg  125 mcg Intravenous Once Corey Ayala, BLESSING        diltiazem ER (CARDIZEM SR) 12 hr capsule 60 mg  60 mg Oral BID Lux Brush MD   60 mg at 11/19/24 1316    doxazosin (CARDURA) tablet 4 mg  4 mg Oral At Bedtime Soto Fernandes MD   4 mg at 11/18/24 2054    ipratropium - albuterol 0.5 mg/2.5 mg/3 mL (DUONEB) neb solution 3 mL  3 mL Nebulization Q4H Soto Fernandes MD   3 mL at 11/19/24 1134    levofloxacin (LEVAQUIN) infusion 500 mg  500 mg Intravenous Q48H Soto Fernandes  mL/hr at 11/19/24 0847 500 mg at 11/19/24 0847    Lidocaine (LIDOCARE) 4 % Patch 1 patch  1 patch Transdermal Q24H Soto Fernandes MD   1 patch at 11/19/24 0847    loratadine (CLARITIN) tablet 10 mg  10 mg Oral Daily Soto Fernandes MD   10 mg at 11/19/24 0848    magnesium oxide (MAG-OX) tablet 400 mg  400 mg Oral Daily Soto Fernandes MD   400 mg at 11/19/24 0848    metoprolol succinate ER (TOPROL XL) 24 hr tablet 50 mg  50 mg Oral Daily Soto Fernandes MD   50 mg at 11/19/24 0848    predniSONE (DELTASONE) tablet 40 mg  40 mg Oral Daily Soto Fernandes MD   40 mg at 11/19/24 0847    senna-docusate (SENOKOT-S/PERICOLACE) 8.6-50 MG per tablet 1 tablet  1 tablet Oral BID Soto Fernandes MD    1 tablet at 11/18/24 0930    Or    senna-docusate (SENOKOT-S/PERICOLACE) 8.6-50 MG per tablet 2 tablet  2 tablet Oral BID Soto Fernandes MD   2 tablet at 11/19/24 0847    sodium chloride (PF) 0.9% PF flush 3 mL  3 mL Intracatheter Q8H Soto Fernandes MD   3 mL at 11/19/24 0849       Data   Recent Labs   Lab 11/19/24  0620 11/18/24  0636 11/17/24  0502 11/16/24  0503 11/15/24  0730   WBC 15.7* 15.1*  --  13.5* 11.4*   HGB 12.4 11.7  --  11.3* 12.2   MCV 95 94  --  95 97    177  --  188 185    134* 134*  134* 136 137   POTASSIUM 4.2 4.9 4.8  4.8 5.1 4.3   CHLORIDE 102 105 102  102 103 103   CO2 25 23 24  24 24 26   BUN 34.0* 35.9* 33.1*  33.1* 27.3* 20.5   CR 1.13* 1.24* 1.19*  1.19* 1.22* 1.15*   ANIONGAP 8 6* 8  8 9 8   CHANDNI 9.5 9.4 9.6  9.6 9.6 9.5   GLC 95 120* 147*  147* 128* 114*   ALBUMIN  --   --  3.8  --  4.0   PROTTOTAL  --   --  6.9  --  7.2   BILITOTAL  --   --  0.2  --  0.2   ALKPHOS  --   --  72  --  82   ALT  --   --  13  --  10   AST  --   --  26  --  18   LIPASE  --   --  16  --   --        No results found for this or any previous visit (from the past 24 hours).

## 2024-11-19 NOTE — CONSULTS
PULMONARY CONSULT  Date of service: 2024    Patient: Angely Bryan      : 10/20/1931      MRN: [id number]         Impressions/Recommendations:   COPD Exacerbation:   -Duonebs + budesonide nebs.  -Prednisone.  - If she continues to have hypoxemia thebn may consider CT PE.  -Agree with dnr/DNI  -Consider palliative care referral.      Сергей Bolton MD  Pulmonary & Critical Care            History of Present Illness:   Angely Bryan is a 89 year old female with history of hypertension, hyperlipidemia, atrial fibrillation, CKD 3, CVA, GERD, ICH, breast cancer, low back pain, and COPD who was admitted on 11/15/24 for acute hypoxic respiratory failure likely 2/2 COPD exacerbation.           Review of Symptoms:   10-point ROS reviewed, & found negative w/ exceptions noted in the HPI.          Past Medical History:     Past Medical History:   Diagnosis Date    Arthritis     Cancer (H)     breast, left mastectomy    Chronic anticoagulation     COPD (chronic obstructive pulmonary disease) (H)     CVA (cerebral infarction) 7/3-    Left sided CVA    GERD (gastroesophageal reflux disease)     Hypertension     Osteopenia 2011    T score of -2.3 in hip    Paroxysmal atrial fibrillation (H)     Peptic ulcer     Unspecified cerebral artery occlusion with cerebral infarction     Vitamin B deficiency 2015    Vitamin D deficiency        Past Surgical History:   Procedure Laterality Date    appendectomy      ENDARTERECTOMY CAROTID Left 2015    Procedure: ENDARTERECTOMY CAROTID;  Surgeon: Arnold Abel MD;  Location: [facility] OR    MASTECTOMY Left 10/20/1974    ORTHOPEDIC SURGERY  2005    Lumbar Fusion    ORTHOPEDIC SURGERY      Knee Arthroscopy            Allergies:     Allergies   Allergen Reactions    Losartan Swelling     Tongue swelling    Penicillins Rash     Tongue swelling              Outpatient Medications:     Current Facility-Administered Medications   Medication Dose Route Frequency Provider  Last Rate Last Admin    acetaminophen (TYLENOL) tablet 650 mg  650 mg Oral Q4H PRN Soto Fernandes MD   650 mg at 11/17/24 2258    Or    acetaminophen (TYLENOL) Suppository 650 mg  650 mg Rectal Q4H PRN Soto Fernandes MD        albuterol (PROVENTIL) neb solution 2.5 mg  2.5 mg Nebulization Q2H PRN Soto Fernandes MD        alum & mag hydroxide-simethicone (MAALOX) suspension 15 mL  15 mL Oral TID PRN Baron Mckeon, APRN CNP        amLODIPine (NORVASC) tablet 10 mg  10 mg Oral Daily Soto Fernandes MD   10 mg at 11/18/24 0929    bisacodyl (DULCOLAX) suppository 10 mg  10 mg Rectal Daily PRN Soto Fernandes MD        budesonide (PULMICORT) neb solution 1 mg  1 mg Nebulization BID Сергей Bolton MD   1 mg at 11/18/24 1946    calcium carbonate (TUMS) chewable tablet 1,000 mg  1,000 mg Oral 4x Daily PRN Soto Fernandes MD   1,000 mg at 11/18/24 2042    carboxymethylcellulose PF (REFRESH PLUS) 0.5 % ophthalmic solution 1 drop  1 drop Both Eyes Q1H PRN Le Nguyen MD        cyclobenzaprine (FLEXERIL) tablet 5 mg  5 mg Oral Q8H PRN Baron Mckeon, HEIDY CNP   5 mg at 11/17/24 1358    doxazosin (CARDURA) tablet 4 mg  4 mg Oral At Bedtime Soto Fernandes MD   4 mg at 11/18/24 2054    guaiFENesin (ROBITUSSIN) 20 mg/mL solution 200 mg  200 mg Oral Q4H PRN Soto Fernandes MD        guaiFENesin-codeine (ROBITUSSIN AC) 100-10 MG/5ML solution 5 mL  5 mL Oral At Bedtime PRN Soto Fernandes MD        ipratropium - albuterol 0.5 mg/2.5 mg/3 mL (DUONEB) neb solution 3 mL  3 mL Nebulization Q4H Soto Fernandes MD   3 mL at 11/18/24 2344    levofloxacin (LEVAQUIN) infusion 500 mg  500 mg Intravenous Q48H Soto Fernandes  mL/hr at 11/17/24 1013 500 mg at 11/17/24 1013    Lidocaine (LIDOCARE) 4 % Patch 1 patch  1 patch Transdermal Q24H Soto Fernandes MD   1 patch at 11/18/24 0929    lidocaine (LMX4) cream   Topical Q1H PRN Soto Fernandes MD         lidocaine (LMX4) cream   Topical Q1H PRN Soto Fernandes MD        lidocaine 1 % 0.1-1 mL  0.1-1 mL Other Q1H PRN Soto Fernandes MD        lidocaine 1 % 0.1-1 mL  0.1-1 mL Other Q1H PRN Soto Fernandes MD        loratadine (CLARITIN) tablet 10 mg  10 mg Oral Daily Soto Fernandes MD   10 mg at 11/18/24 0929    magnesium oxide (MAG-OX) tablet 400 mg  400 mg Oral Daily Soto Fernandes MD   400 mg at 11/18/24 0929    metoprolol (LOPRESSOR) injection 5 mg  5 mg Intravenous Q4H PRN Baron Mckeon APRN CNP   5 mg at 11/19/24 0140    metoprolol succinate ER (TOPROL XL) 24 hr tablet 50 mg  50 mg Oral Daily Soto Fernandes MD   50 mg at 11/18/24 0930    nitroGLYcerin (NITROSTAT) sublingual tablet 0.4 mg  0.4 mg Sublingual Q5 Min PRN Baron Mckeon APRN CNP   0.4 mg at 11/17/24 0422    No lozenges or gum should be given while patient on BIPAP/AVAPS/AVAPS AE   Does not apply Continuous PRN Le Nguyen MD        ondansetron (ZOFRAN ODT) ODT tab 4 mg  4 mg Oral Q6H PRN Soto Fernandes MD        Or    ondansetron (ZOFRAN) injection 4 mg  4 mg Intravenous Q6H PRN Soto Fernandes MD   4 mg at 11/17/24 0444    Patient may continue current oral medications   Does not apply Continuous PRN Le Nguyen MD        polyethylene glycol (MIRALAX) Packet 17 g  17 g Oral BID PRN Soto Fernandes MD        predniSONE (DELTASONE) tablet 40 mg  40 mg Oral Daily Soto Fernandes MD   40 mg at 11/18/24 0929    senna-docusate (SENOKOT-S/PERICOLACE) 8.6-50 MG per tablet 1 tablet  1 tablet Oral BID Soto Fernandes MD   1 tablet at 11/18/24 0930    Or    senna-docusate (SENOKOT-S/PERICOLACE) 8.6-50 MG per tablet 2 tablet  2 tablet Oral BID Soto Fernandes MD   2 tablet at 11/18/24 2043    sodium chloride (PF) 0.9% PF flush 3 mL  3 mL Intracatheter Q8H Soto Fernandes MD   3 mL at 11/18/24 2043    sodium chloride (PF) 0.9% PF flush 3 mL  3 mL  "Intracatheter q1 min prSoto Alvarado MD        sodium chloride (PF) 0.9% PF flush 3 mL  3 mL Intracatheter q1 min Soto Falcon MD                 Family History:     Family History   Problem Relation Age of Onset    Diabetes Mother     Hypertension Mother     Pulmonary Embolism Father     Cancer Sister     Arrhythmia Sister     Arrhythmia Son                Social History:     Social History     Tobacco Use    Smoking status: Former     Current packs/day: 0.00     Average packs/day: 2.0 packs/day for 40.0 years (80.0 ttl pk-yrs)     Types: Cigarettes     Start date: 10/22/1951     Quit date: 10/22/1991     Years since quittin.1   Substance Use Topics    Alcohol use: Yes     Comment: 2 glasses wine /week    Drug use: No             Physical Exam:   BP 96/60   Pulse 105   Temp 98  F (36.7  C) (Oral)   Resp 11   Ht 1.626 m (5' 4\")   Wt 62.1 kg (136 lb 12.8 oz)   SpO2 (!) 89%   BMI 23.48 kg/m      General: NAD  HEENT: Anicteric sclera, EOMI, MMM, OP unobstructed, w/o erythema/discharge; no cervical LAD  CV: RRR, no m/r/g  Lungs: Wheeazing   Abd: Soft, NT, ND  Ext: WWP,  No LE edema  Skin: No rashes, cyanosis, or jaundice  Neuro: AAOx3, no focal deficits          Data:           "

## 2024-11-19 NOTE — PLAN OF CARE
Neuro:A&O x4, forgetful  Tele/cardiac: afib RVR, started on amio drip  Respiration: HFNC 60% 40 L  Activity: A2  Pain: denies  Drips: PIV SL  Drains/tubes: none  Skin: scattered bruising  GI/: pure wick in place  Aggression color: green  Isolation: none  Plan: wean off O2 as able

## 2024-11-19 NOTE — PROGRESS NOTES
Care Management Follow Up    Length of Stay (days): 4    Expected Discharge Date: 11/21/2024     Concerns to be Addressed: discharge planning     Patient plan of care discussed at interdisciplinary rounds: Yes    Anticipated Discharge Disposition: Long Term Care       Anticipated Discharge Services: None  Anticipated Discharge DME: None    Patient/family educated on Medicare website which has current facility and service quality ratings: no  Education Provided on the Discharge Plan: No  Patient/Family in Agreement with the Plan: yes    Referrals Placed by CM/SW: Post Acute Facilities  Private pay costs discussed: Not applicable    Discussed  Partnership in Safe Discharge Planning  document with patient/family: No     Handoff Completed: No, handoff not indicated or clinically appropriate    Additional Information:  SW reviewed chart and discussed in interdisciplinary rounds. Pt remains on high-flow O2 and not medically ready for discharge. [facility], when ready. [facility] can not manage high-flow O2. Phone call from Loi Barbosa, [phone]. SW updated her, per her request.    Next Steps: Coordinate discharge back to LTC, when ready. SW following for discharge planning.     DORIS Mina, LICSW  [phone] Desk phone  [phone] Cell/text (Preferred)  [facility]

## 2024-11-19 NOTE — CONSULTS
"Cardiology Progress Note          Assessment and Plan:       Acute exacerbation of chronic obstructive pulmonary disease (COPD) (H)   Afib with RVR  Secondary to COPD exacerbation and treatment  Afib is intermittent and paroxysmal.  IV amiodarone reasonable.  Will add oral cardizem.   DNR/DNI  Not OAC candidate prior cerebral bleed.   Prior nuclear stress test negative for ischemia.  Seen by Dr. Carroll.                Interval History:     Angely is a pleasant 89-year-old female with history of paroxysmal atrial fibrillation for many years previously seen by Dr. Allen who was admitted with a COPD exacerbation treated with nebs steroids.  She is DNR/DNI consideration of palliative care.  She was in sinus rhythm last week however now went into A-fib with rapid ventricular sponsor heart rate in the 150s started on IV of amiodarone drip and heart rate in 130s cardiology consultation was requested.  Upon my seeing the patient in the room she is at heart rate of 130 and resting comfortably with no palpitations chest pain chest pressure shortness of breath.    .  Pericardiac testing that I can see was nuclear stress test in  which was negative for ischemia.  She has had no history microinfarction congestive heart failure.  She does have a hypertension and carotid disease.  She had previous history of cerebral brief bleed and was taken off oral anticoagulants and felt is not a anticoagulant candidate.              Review of Systems:   As per subjective, otherwise 5 systems reviewed and negative.           Physical Exam:   Blood pressure 102/72, pulse (!) 130, temperature 97.8  F (36.6  C), temperature source Oral, resp. rate 19, height 1.626 m (5' 4\"), weight 59.3 kg (130 lb 11.7 oz), SpO2 97%.      Vital Sign Ranges  Temperature Temp  Av.2  F (36.8  C)  Min: 97.4  F (36.3  C)  Max: 99  F (37.2  C)   Blood pressure Systolic (24hrs), Av , Min:78 , Max:137        Diastolic (24hrs), Av, Min:55, Max:111    "   Pulse Pulse  Av.4  Min: 56  Max: 141   Respirations Resp  Av.9  Min: 11  Max: 60   Pulse oximetry SpO2  Av.1 %  Min: 89 %  Max: 98 %         Intake/Output Summary (Last 24 hours) at 2024 1047  Last data filed at 2024 0607  Gross per 24 hour   Intake --   Output 1400 ml   Net -1400 ml       Constitutional:   NAD   Skin:   Warm and dry   Head:   Nontraumatic   Neck:   Supple, symmetrical, trachea midline, no adenopathy, thyroid symmetric, not enlarged and no tenderness, skin normal   Lungs:   scattered wheezes   Cardiovascular:   irregularly irregular rhythm and no murmur noted    Abdomen:   Benign   Extremities and Back:   Symmetric, no curvature, spinous processes are non-tender on palpation, paraspinous muscles are non-tender on palpation, no costal vertebral tenderness   Neurological:   Grossly nonfocal            Medications:     No current outpatient medications on file.                Data:     Results for orders placed or performed during the hospital encounter of 11/15/24 (from the past 24 hours)   EKG 12-lead, tracing only   Result Value Ref Range    Systolic Blood Pressure  mmHg    Diastolic Blood Pressure  mmHg    Ventricular Rate 151 BPM    Atrial Rate 182 BPM    NE Interval  ms    QRS Duration 98 ms     ms    QTc 485 ms    P Axis  degrees    R AXIS -36 degrees    T Axis 115 degrees    Interpretation ECG       Atrial fibrillation with rapid ventricular response  Left axis deviation  Possible Anterior infarct (cited on or before 23-Dec-2023)  ST & T wave abnormality, consider lateral ischemia  Abnormal ECG  When compared with ECG of 2024 04:17,  Atrial fibrillation has replaced Sinus rhythm  Vent. rate has increased by  64 bpm  ST now depressed in Inferior leads  T wave inversion more evident in Lateral leads     Basic metabolic panel   Result Value Ref Range    Sodium 135 135 - 145 mmol/L    Potassium 4.2 3.4 - 5.3 mmol/L    Chloride 102 98 - 107 mmol/L    Carbon  Dioxide (CO2) 25 22 - 29 mmol/L    Anion Gap 8 7 - 15 mmol/L    Urea Nitrogen 34.0 (H) 8.0 - 23.0 mg/dL    Creatinine 1.13 (H) 0.51 - 0.95 mg/dL    GFR Estimate 45 (L) >60 mL/min/1.73m2    Calcium 9.5 8.8 - 10.4 mg/dL    Glucose 95 70 - 99 mg/dL   CBC with platelets   Result Value Ref Range    WBC Count 15.7 (H) 4.0 - 11.0 10e3/uL    RBC Count 4.06 3.80 - 5.20 10e6/uL    Hemoglobin 12.4 11.7 - 15.7 g/dL    Hematocrit 38.7 35.0 - 47.0 %    MCV 95 78 - 100 fL    MCH 30.5 26.5 - 33.0 pg    MCHC 32.0 31.5 - 36.5 g/dL    RDW 12.2 10.0 - 15.0 %    Platelet Count 176 150 - 450 10e3/uL   Magnesium   Result Value Ref Range    Magnesium 2.0 1.7 - 2.3 mg/dL       Clinically Significant Risk Factors         # Hyponatremia: Lowest Na = 134 mmol/L in last 2 days, will monitor as appropriate           # Hypertension: Noted on problem list     # Acute Hypercapnic Respiratory Failure: based on venous blood gas results.  Continue supplemental oxygen and ventilatory support as indicated.             # Financial/Environmental Concerns: none        Cardiac Arrhythmia: Atrial fibrillation: Paroxysmal                    Acute kidney failure, unspecified    Dementia: Unspecified dementia without behavioral disturbance    COPD

## 2024-11-19 NOTE — CODE/RAPID RESPONSE
[facility]    RRT Note  11/19/2024   Time Called: 1:04 PM    Code Status: No CPR- Do NOT Intubate    I was called to evaluate Angely Bryan, who is a 89 year old female who was admitted on 11/15/2024 for COPD exacerbation. Patient developed Afib with RVR at approximately 1am this  morning. She was not started on anticoagulation due to history of intracranial hemorrhage.     Assessment & Plan     Hypotension suspect 2/2 Afib with RVR  Chest pain 2/2 above  Amiodarone was stopped for about 20 minutes for hypotension prior to RRT. RRT called for  hypotension, lightheadedness, and Afib RVR. Upon arrival patient is laying supine in bed, not in acute distress. Has chronic ill appearance, but she has non-labored breathing pattern without accessory muscle use.  Initial vital signs include , BP 80/67, RR 15, SpO2 94% high flow nasal cannula at 60% FiO2.  Patient endorses slight chest pain, increased shortness of breath, and lightheadedness.  She denies fever, chills, palpitations, or presyncope.  On exam skin is warm and dry.  Lungs are clear, diminished in the bases.  RRR tachycardic, irregular.    Oral diltiazem was given per cardiology who seen the patient shortly before RRT.  Patient's BP improved for short period of time, however patient became hypotensive again with BP in the 80s over 60s.  Patient's HR continue to be in 130s.  250 cc bolus normal saline given.  BP improved however HR in 130s to 140s.  Patient was initially given 125 mcg digoxin with slight improvement in heart rate to the 120s.  Additional 125 mcg digoxin given without further improvement in heart rate.  Discussed with primary hospitalist Dr. Tabares who wanted to stop diltiazem as that may have contributed to subsequent episode of hypotension.    Differentials considered include ACS, PE, hypovolemia, and infection.     INTERVENTIONS:  - 12-lead EKG  - 250 mL bolus NS  - P.O. diltiazem given  - 250 mcg digoxin   -  Future diltiazem doses held after digoxin started, hospitalist would like discuss subsequent digoxin dosing with cardiology.    Goals of care  Had goals of care discussion with patient and her son Garcia.  We discussed that patient continues to have high heart rate despite having multiple medications for rate control.  We discussed that an option to help control heart rate would be electrical cardioversion, however there is a high risk with the procedure given patient's respiratory status.  We also discussed that patient is a DNR/DNI, and there is a risk of converting into lethal heart rhythm or cardiac arrest with cardioversion.  Patient was not ready to make the decision if she would want to be cardioverted or not at this time.  She would like to see how she responds to digoxin.    At end of evaluation patient is resting comfortably in bed. HR remains in 120-130s. She is normotensive. Discussed with and defer further cares to primary hospitalist Dr. Godwin.        Corey Ayala, [facility]  Securely message with the Vocera Web Console (learn more here)  Text page via [employer] Paging/Directory        Physical Exam   Vital Signs with Ranges:  Temp:  [97.4  F (36.3  C)-98.6  F (37  C)] 97.5  F (36.4  C)  Pulse:  [] 144  Resp:  [10-60] 15  BP: ()/() 80/67  FiO2 (%):  [50 %-75 %] 60 %  SpO2:  [89 %-98 %] 92 %  I/O last 3 completed shifts:  In: -   Out: 1400 [Urine:1400]          Physical Exam   General:  Chronic ill appearance, not in acute distress.  Skin:  Warm, dry. No rashes or lesions on exposed skin.  HEENT:  Normocephalic, atraumatic; EOMs grossly intact.  Neck:  Supple.  Chest:  Breath sounds CTA with low pitched expiratory wheezing in bases. Non-labored breathing pattern.   Cardiovascular:  RRR, no rub or murmur. No peripheral edema.  Abdomen:  Soft, non-tender, non-distended.  Musculoskeletal:  Moves all four extremities. No muscle atrophy.  Neurological:  No  "focal neurological deficits.   Psychiatric:  Affect and mood congruent.    Data     EKG:  Interpreted by Corey Ayala NP  Time reviewed: 1:34 PM  Symptoms at time of EKG: lightheadedness, chest pain   Rhythm: atrial fibrillation - rapid  Rate: 142  Axis: Left Axis Deviation  Ectopy: none  Conduction: normal  ST Segments/ T Waves: No acute ischemic changes   Q Waves: none  Comparison to prior: t wave inversion less evident in lateral leads    Clinical Impression: atrial fibrillation (chronic)    IMAGING: (X-ray/CT/MRI)   No results found for this or any previous visit (from the past 24 hours).    CBC with Diff:  Recent Labs   Lab Test 11/19/24  0620   WBC 15.7*   HGB 12.4   MCV 95         No results found for: \"RETICABSCT\"  No results found for: \"RETP\"    Comprehensive Metabolic Panel:  Recent Labs   Lab 11/19/24  0620 11/18/24  0636 11/17/24  0502      < > 134*  134*   POTASSIUM 4.2   < > 4.8  4.8   CHLORIDE 102   < > 102  102   CO2 25   < > 24  24   ANIONGAP 8   < > 8  8   GLC 95   < > 147*  147*   BUN 34.0*   < > 33.1*  33.1*   CR 1.13*   < > 1.19*  1.19*   GFRESTIMATED 45*   < > 42*  42*   CHANDNI 9.5   < > 9.6  9.6   MAG 2.0  --  2.1   PHOS  --   --  2.9   PROTTOTAL  --   --  6.9   ALBUMIN  --   --  3.8   BILITOTAL  --   --  0.2   ALKPHOS  --   --  72   AST  --   --  26   ALT  --   --  13    < > = values in this interval not displayed.         Time Spent on this Encounter       CRITICAL CARE TIME  I spent 30 minutes of critical care time on the unit/floor managing the care of Angely Bryan. Upon evaluation, this patient had a high probability of imminent or life-threatening deterioration due to Afib RVR and hypotension which required my direct attention, intervention, and personal management. 100% of my time was spent at the bedside counseling the patient and/or coordinating care regarding services listed in this note.    "

## 2024-11-20 ENCOUNTER — APPOINTMENT (OUTPATIENT)
Dept: ULTRASOUND IMAGING | Facility: CLINIC | Age: 89
End: 2024-11-20
Attending: STUDENT IN AN ORGANIZED HEALTH CARE EDUCATION/TRAINING PROGRAM
Payer: MEDICARE

## 2024-11-20 LAB
ANION GAP SERPL CALCULATED.3IONS-SCNC: 5 MMOL/L (ref 7–15)
ATRIAL RATE - MUSE: 138 BPM
BUN SERPL-MCNC: 30 MG/DL (ref 8–23)
CALCIUM SERPL-MCNC: 9.2 MG/DL (ref 8.8–10.4)
CHLORIDE SERPL-SCNC: 102 MMOL/L (ref 98–107)
CREAT SERPL-MCNC: 1.24 MG/DL (ref 0.51–0.95)
DIASTOLIC BLOOD PRESSURE - MUSE: NORMAL MMHG
EGFRCR SERPLBLD CKD-EPI 2021: 40 ML/MIN/1.73M2
ERYTHROCYTE [DISTWIDTH] IN BLOOD BY AUTOMATED COUNT: 12.1 % (ref 10–15)
GLUCOSE SERPL-MCNC: 88 MG/DL (ref 70–99)
HCO3 SERPL-SCNC: 27 MMOL/L (ref 22–29)
HCT VFR BLD AUTO: 36.4 % (ref 35–47)
HGB BLD-MCNC: 12.3 G/DL (ref 11.7–15.7)
INTERPRETATION ECG - MUSE: NORMAL
MCH RBC QN AUTO: 30.9 PG (ref 26.5–33)
MCHC RBC AUTO-ENTMCNC: 33.8 G/DL (ref 31.5–36.5)
MCV RBC AUTO: 92 FL (ref 78–100)
P AXIS - MUSE: NORMAL DEGREES
PLATELET # BLD AUTO: 174 10E3/UL (ref 150–450)
POTASSIUM SERPL-SCNC: 4.3 MMOL/L (ref 3.4–5.3)
PR INTERVAL - MUSE: NORMAL MS
QRS DURATION - MUSE: 106 MS
QT - MUSE: 312 MS
QTC - MUSE: 479 MS
R AXIS - MUSE: -34 DEGREES
RBC # BLD AUTO: 3.98 10E6/UL (ref 3.8–5.2)
SODIUM SERPL-SCNC: 134 MMOL/L (ref 135–145)
SYSTOLIC BLOOD PRESSURE - MUSE: NORMAL MMHG
T AXIS - MUSE: 111 DEGREES
VENTRICULAR RATE- MUSE: 142 BPM
WBC # BLD AUTO: 14.1 10E3/UL (ref 4–11)

## 2024-11-20 PROCEDURE — 250N000013 HC RX MED GY IP 250 OP 250 PS 637: Performed by: STUDENT IN AN ORGANIZED HEALTH CARE EDUCATION/TRAINING PROGRAM

## 2024-11-20 PROCEDURE — 999N000157 HC STATISTIC RCP TIME EA 10 MIN

## 2024-11-20 PROCEDURE — 250N000012 HC RX MED GY IP 250 OP 636 PS 637: Performed by: HOSPITALIST

## 2024-11-20 PROCEDURE — 250N000013 HC RX MED GY IP 250 OP 250 PS 637: Performed by: HOSPITALIST

## 2024-11-20 PROCEDURE — 85018 HEMOGLOBIN: CPT | Performed by: STUDENT IN AN ORGANIZED HEALTH CARE EDUCATION/TRAINING PROGRAM

## 2024-11-20 PROCEDURE — 36415 COLL VENOUS BLD VENIPUNCTURE: CPT | Performed by: STUDENT IN AN ORGANIZED HEALTH CARE EDUCATION/TRAINING PROGRAM

## 2024-11-20 PROCEDURE — 250N000009 HC RX 250: Performed by: STUDENT IN AN ORGANIZED HEALTH CARE EDUCATION/TRAINING PROGRAM

## 2024-11-20 PROCEDURE — 80048 BASIC METABOLIC PNL TOTAL CA: CPT | Performed by: STUDENT IN AN ORGANIZED HEALTH CARE EDUCATION/TRAINING PROGRAM

## 2024-11-20 PROCEDURE — 250N000013 HC RX MED GY IP 250 OP 250 PS 637: Performed by: PHYSICIAN ASSISTANT

## 2024-11-20 PROCEDURE — 250N000009 HC RX 250: Performed by: INTERNAL MEDICINE

## 2024-11-20 PROCEDURE — 120N000013 HC R&B IMCU

## 2024-11-20 PROCEDURE — 99233 SBSQ HOSP IP/OBS HIGH 50: CPT | Performed by: STUDENT IN AN ORGANIZED HEALTH CARE EDUCATION/TRAINING PROGRAM

## 2024-11-20 PROCEDURE — 94640 AIRWAY INHALATION TREATMENT: CPT

## 2024-11-20 PROCEDURE — 82435 ASSAY OF BLOOD CHLORIDE: CPT | Performed by: STUDENT IN AN ORGANIZED HEALTH CARE EDUCATION/TRAINING PROGRAM

## 2024-11-20 PROCEDURE — 93970 EXTREMITY STUDY: CPT

## 2024-11-20 PROCEDURE — 94640 AIRWAY INHALATION TREATMENT: CPT | Mod: 76

## 2024-11-20 PROCEDURE — 99232 SBSQ HOSP IP/OBS MODERATE 35: CPT | Mod: FS | Performed by: PHYSICIAN ASSISTANT

## 2024-11-20 RX ORDER — LIDOCAINE 4 G/G
1 PATCH TOPICAL
Status: DISCONTINUED | OUTPATIENT
Start: 2024-11-20 | End: 2024-11-24 | Stop reason: HOSPADM

## 2024-11-20 RX ORDER — METOPROLOL SUCCINATE 100 MG/1
100 TABLET, EXTENDED RELEASE ORAL DAILY
Status: DISCONTINUED | OUTPATIENT
Start: 2024-11-21 | End: 2024-11-24 | Stop reason: HOSPADM

## 2024-11-20 RX ORDER — AMIODARONE HYDROCHLORIDE 200 MG/1
200 TABLET ORAL 2 TIMES DAILY
Status: DISCONTINUED | OUTPATIENT
Start: 2024-11-20 | End: 2024-11-24 | Stop reason: HOSPADM

## 2024-11-20 RX ORDER — POLYETHYLENE GLYCOL 3350 17 G/17G
17 POWDER, FOR SOLUTION ORAL 2 TIMES DAILY
Status: DISCONTINUED | OUTPATIENT
Start: 2024-11-20 | End: 2024-11-24 | Stop reason: HOSPADM

## 2024-11-20 RX ADMIN — ACETAMINOPHEN 650 MG: 325 TABLET, FILM COATED ORAL at 22:27

## 2024-11-20 RX ADMIN — Medication 400 MG: at 10:04

## 2024-11-20 RX ADMIN — PREDNISONE 40 MG: 20 TABLET ORAL at 10:04

## 2024-11-20 RX ADMIN — POLYETHYLENE GLYCOL 3350 17 G: 17 POWDER, FOR SOLUTION ORAL at 22:26

## 2024-11-20 RX ADMIN — LEVALBUTEROL HYDROCHLORIDE 1.25 MG: 1.25 SOLUTION RESPIRATORY (INHALATION) at 20:06

## 2024-11-20 RX ADMIN — SENNOSIDES AND DOCUSATE SODIUM 2 TABLET: 50; 8.6 TABLET ORAL at 10:04

## 2024-11-20 RX ADMIN — LORATADINE 10 MG: 10 TABLET ORAL at 10:04

## 2024-11-20 RX ADMIN — AMIODARONE HYDROCHLORIDE 200 MG: 200 TABLET ORAL at 22:26

## 2024-11-20 RX ADMIN — SENNOSIDES AND DOCUSATE SODIUM 2 TABLET: 50; 8.6 TABLET ORAL at 22:26

## 2024-11-20 RX ADMIN — LEVALBUTEROL HYDROCHLORIDE 1.25 MG: 1.25 SOLUTION RESPIRATORY (INHALATION) at 10:50

## 2024-11-20 RX ADMIN — LIDOCAINE 1 PATCH: 4 PATCH TOPICAL at 10:04

## 2024-11-20 RX ADMIN — METOPROLOL SUCCINATE 75 MG: 50 TABLET, EXTENDED RELEASE ORAL at 10:04

## 2024-11-20 RX ADMIN — POLYETHYLENE GLYCOL 3350 17 G: 17 POWDER, FOR SOLUTION ORAL at 06:48

## 2024-11-20 RX ADMIN — LEVALBUTEROL HYDROCHLORIDE 1.25 MG: 1.25 SOLUTION RESPIRATORY (INHALATION) at 07:19

## 2024-11-20 RX ADMIN — LEVALBUTEROL HYDROCHLORIDE 1.25 MG: 1.25 SOLUTION RESPIRATORY (INHALATION) at 16:00

## 2024-11-20 RX ADMIN — BUDESONIDE 1 MG: 1 INHALANT ORAL at 07:20

## 2024-11-20 NOTE — PROGRESS NOTES
[facility]  Hospitalist Progress Note    Assessment & Plan   Angely Bryan is a 89 year old female with history of hypertension, hyperlipidemia, atrial fibrillation, CKD 3, CVA, GERD, ICH, breast cancer, low back pain, and COPD who was admitted on 11/15/24 for acute hypoxic respiratory failure likely 2/2 COPD exacerbation.      Acute Hypoxic Respiratory Failure 2/2 COPD Exacerbation  Hx of rib fracture  Occasionally uses 2-3L of home O2 when ill, lately using it over the past 2 weeks or so. Productive cough and orthopnea. Breathing this morning felt much worse than recent days and she has been somewhat nauseous. No fevers, chills, or vomiting. Chronic chest pain from prior rib fracture noted but no new chest pain. No fever at home. She has reportedly been on doxy for the past couple days. In the ED, she received duonebs and solumedrol. WBC 11.4, LA: 0.9, BNP: 385, D dimer 0.60, WNL for age adjustment. CXR without acute infiltrate, no signs of pulm edema. Needing up to 4L and subsequently on bipap in ED. Patient had an RRT on 11/17/24 for chest pain. It was likely MSK as pain was reproducible on palpation. No new fx or findings on CXR overnight, EKG and trop not ischemic appearing.     - She confirmed she is DNR DNI and would not want pre arrest intubation either (son present for discussion in ED)     - Spo2 > 88%               - Currently on HFNC                - Wean as able      - Continue scheduled and prn nebs  - Empiric abx given exacerbation, levofloxacin continued  - Continue steroid, solumedrol 60 bid for now x 2 days followed by prednisone 40 daily x 3 days   - Incentive spirometry     - Will get B/L LE DVT US to rule out DVT    - Patient and family would like this done before getting CT PE     - Pulmonary consulted                - Added budesonide nebs                - If she continues to have hypoxemia then may consider CT PE     Atrial Fibrillation, persistent   PSVT  Mildly  tachycardic in ED. Not anticoagulated given history of intraparenchymal and intraventricular bleed while on anticoagulation previously. Early morning on 11/19/24, patient had a 26 beat run of PSVT followed by Afib with RVR. Patient was asymptomatic. Was given 2.5mg IV Metoprolol with no improvement. She was given another 2.5mg IV Metoprolol followed by 10mg IV Dilt with no improvement. She was started on Amiodarone ggt. On 11/19/24 morning, patient continued to have elevated HR in the 130-140s. Cardiology consulted and recommended PO Diltiazem. During the afternoon on 11/19/24, patient had hypotension with increased HR of 140s. RRT was called. Initial plan was so cardiovert given unstability. Patient is DNI/DNR. Patient was not sure with cardioversion. Was given 125mcg IV Digoxin with minimal improvement in HR but some improvement with BP. House team had extensive goals of care conversation with patient and family but patient still uncertain with cardioversion. House team giving another 125mcg of digoxin. Was given PRN IV Metoprolol and patient converted to NSR on 11/20/24 around 2:02am.      - Tele      - Continue Metoprolol with hold parameters      - Cardiology consulted and signed off 11/20/24   - Increase metoprolol to 100 mg daily with hold parameters  - Discontinue diltiazem and digoxin   - Transition to PO amiodarone   - Unable to introduce anticoagulation given hx of brain bleed  - Outpatient follow up will be arranged    Goals of Care  Attempted to discuss GOC with patient during rounds on 11/19/24. She stated that she would need to talk to her family first. She is unclear what she would want moving forward. She confirms DNR/DNI status. House team had in depth GOC discussion with patient and family during RRT on 11/19/24. Spoke to patient and daughter in law on 11/200/24 about the minimal progress with her oxygen but improvement in her HR. They both understand that her lungs are weak. Patient is not  opposed to considering hospice but at this time would like more time to think about it, see if she makes any improvements and talk to family.      - Continue to discuss    CKD III   - Cr: 1.15 > 1.19 > 1.24 > 1.13 > 1.24   - Baseline: 1.10-1.25  - Continue to monitor      Hypertension  Hyperlipidemia  BP mildly elevated in ED.  - PTA metoprolol succ, and statin to continue     History of mild cognitive impairment  Was noted to have mild memory loss and some gait impairment which had been improving in the past year acting as below she does have history of intraparenchymal and intraventricular bleed while on anticoagulation for A-fib.  - MoCA 23/30 in April 2024  - Reorient as needed, encourage sleep-wake cycles  - OT consult     Hx of intraparenchymal and intraventricular bleed while on anticoagulation for a fib (December 2023)  Noted. Neurosurgery evaluated at that time in hospital. [place] to have memory impairment and gait instability that was improving in past year or so.   - Not on AC, baby aspirin noted     Chronic low back pain  Appears at baseline.  - PTA regimen to continue    Clinically Significant Risk Factors         # Hyponatremia: Lowest Na = 134 mmol/L in last 2 days, will monitor as appropriate           # Hypertension: Noted on problem list     # Acute Hypercapnic Respiratory Failure: based on venous blood gas results.  Continue supplemental oxygen and ventilatory support as indicated.             # Financial/Environmental Concerns: none           Diet: Regular Diet Adult     DVT Prophylaxis: Pneumatic Compression Devices   Tuttle Catheter: Not present  Lines: None     Cardiac Monitoring: ACTIVE order. Indication: copd exacerbation, hx a fib, IMC  Code Status: No CPR- Do NOT Intubate      Disposition Plan       Expected Discharge Date: 11/25/2024,  3:00 PM    Destination: long-term care facility        Entered: Maribel Godwin MD 11/20/2024, 1:22 PM     Notes Reviewed: Cardiology    Family Updated:  Spoke to family at bedside on 11/20/24    Care Team Updated: Bedside nursing updated    Disposition:  Likely 2-3 days pending oxygen requirements and HR control along with GOC discussions       Medically Ready for Discharge: Anticipated in 2-4 Days        Maribel Godwin MD  Hospitalist Service   [facility]  Securely message with the Vocera Web Console (learn more here)         Medical Decision Making       55 MINUTES SPENT BY ME on the date of service doing chart review, history, exam, documentation & further activities per the note.           Interval History     Overnight patient self converted to NSR    This morning the patient states that she is doing okay. States that she gets SOB fast when her oxygen is not in place. Discussed GOC with patient and daughter in law.     -Data reviewed today: I reviewed all new labs and imaging results over the last 24 hours.     Physical Exam   Temp: 97.6  F (36.4  C) Temp src: Oral BP: 126/64 Pulse: 57   Resp: 16 SpO2: 92 % O2 Device: Oxymask Oxygen Delivery: 8 LPM  Vitals:    11/15/24 1809 11/18/24 0542 11/19/24 0606   Weight: 60.8 kg (134 lb 0.6 oz) 62.1 kg (136 lb 12.8 oz) 59.3 kg (130 lb 11.7 oz)     Vital Signs with Ranges  Temp:  [97.6  F (36.4  C)-98  F (36.7  C)] 97.6  F (36.4  C)  Pulse:  [] 57  Resp:  [11-26] 16  BP: ()/() 126/64  FiO2 (%):  [50 %-80 %] 50 %  SpO2:  [88 %-98 %] 92 %  I/O last 3 completed shifts:  In: 550 [P.O.:300; IV Piggyback:250]  Out: 1200 [Urine:1200]      Constitutional: Awake, alert, cooperative, no apparent distress.    HEENT: PERRL, Normocephalic, without obvious abnormality, atraumatic, oral pharynx with moist mucus membranes  Pulmonary: Faint bilateral crackles   Cardiovascular: Irregular rate and rhythm, normal S1 and S2  GI: Normal bowel sounds, soft, non-distended, non-tender.  Skin/Integumen: Visualized skin appeared clear.  Neuro: CN II-XII grossly intact.  Psych:  Alert and oriented x  3.   Extremities: No lower extremity edema noted      Medications   Current Facility-Administered Medications   Medication Dose Route Frequency Provider Last Rate Last Admin    amiodarone (CORDARONE) 1.8 mg/mL in sodium chloride 0.9% 500 mL ADULT STANDARD infusion  0.5 mg/min Intravenous Continuous Tam Moran MD   Paused at 11/19/24 1247    No lozenges or gum should be given while patient on BIPAP/AVAPS/AVAPS AE   Does not apply Continuous PRN Le Ngyuen MD        Patient may continue current oral medications   Does not apply Continuous PRN Le Nguyen MD         Current Facility-Administered Medications   Medication Dose Route Frequency Provider Last Rate Last Admin    budesonide (PULMICORT) neb solution 1 mg  1 mg Nebulization BID Сергей Bolton MD   1 mg at 11/20/24 0720    [Held by provider] diltiazem ER (CARDIZEM SR) 12 hr capsule 60 mg  60 mg Oral BID Maribel Godwin MD   60 mg at 11/19/24 1316    doxazosin (CARDURA) tablet 4 mg  4 mg Oral At Bedtime Soto Fernandes MD   4 mg at 11/19/24 2107    levalbuterol (XOPENEX) neb solution 1.25 mg  1.25 mg Nebulization Q4H WA Bill Gr MD   1.25 mg at 11/20/24 1050    levofloxacin (LEVAQUIN) infusion 500 mg  500 mg Intravenous Q48H Soto Fernandes  mL/hr at 11/19/24 0847 500 mg at 11/19/24 0847    Lidocaine (LIDOCARE) 4 % Patch 1 patch  1 patch Transdermal Q24H Maribel Godwin MD   1 patch at 11/20/24 1004    loratadine (CLARITIN) tablet 10 mg  10 mg Oral Daily Soto Fernandes MD   10 mg at 11/20/24 1004    magnesium oxide (MAG-OX) tablet 400 mg  400 mg Oral Daily Soto Fernandes MD   400 mg at 11/20/24 1004    metoprolol succinate ER (TOPROL XL) 24 hr tablet 75 mg  75 mg Oral Daily Maribel Godwin MD   75 mg at 11/20/24 1004    senna-docusate (SENOKOT-S/PERICOLACE) 8.6-50 MG per tablet 1 tablet  1 tablet Oral BID Soto Fernandes MD   1 tablet at 11/18/24 4261    Or    senna-docusate  (SENOKOT-S/PERICOLACE) 8.6-50 MG per tablet 2 tablet  2 tablet Oral BID Soto Fernandes MD   2 tablet at 11/20/24 1004    sodium chloride (PF) 0.9% PF flush 3 mL  3 mL Intracatheter Q8H Soto Fernandes MD   3 mL at 11/20/24 1005       Data   Recent Labs   Lab 11/20/24  0444 11/19/24  0620 11/18/24  0636 11/17/24  0502 11/16/24  0503 11/15/24  0730   WBC 14.1* 15.7* 15.1*  --    < > 11.4*   HGB 12.3 12.4 11.7  --    < > 12.2   MCV 92 95 94  --    < > 97    176 177  --    < > 185   * 135 134* 134*  134*   < > 137   POTASSIUM 4.3 4.2 4.9 4.8  4.8   < > 4.3   CHLORIDE 102 102 105 102  102   < > 103   CO2 27 25 23 24  24   < > 26   BUN 30.0* 34.0* 35.9* 33.1*  33.1*   < > 20.5   CR 1.24* 1.13* 1.24* 1.19*  1.19*   < > 1.15*   ANIONGAP 5* 8 6* 8  8   < > 8   CHANDNI 9.2 9.5 9.4 9.6  9.6   < > 9.5   GLC 88 95 120* 147*  147*   < > 114*   ALBUMIN  --   --   --  3.8  --  4.0   PROTTOTAL  --   --   --  6.9  --  7.2   BILITOTAL  --   --   --  0.2  --  0.2   ALKPHOS  --   --   --  72  --  82   ALT  --   --   --  13  --  10   AST  --   --   --  26  --  18   LIPASE  --   --   --  16  --   --     < > = values in this interval not displayed.       No results found for this or any previous visit (from the past 24 hours).

## 2024-11-20 NOTE — PROGRESS NOTES
[facility]  Cardiology Progress Note    Date of Service (when I saw the patient): 11/20/2024  Primary Cardiologist: Dr. Carroll       Interval History:   Converted to NSR. RRT yesterday with A fib RVR with borderline hemodynamic instability.     ----------------------------------------------------------------------------------------    Assessment:  Angely Bryan is a 89 year old female who was admitted on 11/15/2024 with COPD exacerbation. Cardiology consulted for atrial fibrillation RVR.     # A fib RVR  -- now in NSR  -- on metoprolol 75 mg daily  -- diltiazem on hold due to low BP  -- digoxin on hold due to increased toxicity risk with concomitant amiodarone use and chronic kidney disease     # Acute Hypoxic Respiratory failure   -- felt to be secondary to COPD exacerbation     # Hx of intraparenchymal and intraventricular bleed while on OAC for A fib in 12/2023  -- OAC felt to be contraindicated   -- not LAAO candidate given advanced age and frailty     # CKD, stage 3     # DNR/DNI    # HTN  # HLD     ----------------------------------------------------------------------------------------    Plan:  -- Increase metoprolol to 100 mg daily with hold parameters  -- Discontinue diltiazem and digoxin   -- Transition to PO amiodarone   -- Unable to introduce anticoagulation given hx of brain bleed  -- Outpatient follow up will be arranged  -- Cardiology will sign off       ----------------------------------------------------------------------------------------  Physical Exam   Temp: 97.6  F (36.4  C) Temp src: Oral BP: 126/64 Pulse: 57   Resp: 16 SpO2: 92 % O2 Device: Oxymask Oxygen Delivery: 8 LPM  Vitals:    11/15/24 1809 11/18/24 0542 11/19/24 0606   Weight: 60.8 kg (134 lb 0.6 oz) 62.1 kg (136 lb 12.8 oz) 59.3 kg (130 lb 11.7 oz)       GEN:  NAD. Thin and frail appearing.   HEENT: Mucous membranes moist.  NECK:  No JVD.  C/V:  Regular rate and rhythm, no murmur, rub or gallop.   RESP:  Prolonged expiratory phase with some diminishment at the bases.   GI: Abdomen soft, nontender, nondistended.    EXTREM: No pitting LE edema.   NEURO: Alert and oriented, cooperative.   PSYCH: Normal affect.  SKIN: Warm and dry.   VASC: 2+ radial and dorsalis pedis pulses bilaterally.      Medications   Current Facility-Administered Medications   Medication Dose Route Frequency Provider Last Rate Last Admin    amiodarone (CORDARONE) 1.8 mg/mL in sodium chloride 0.9% 500 mL ADULT STANDARD infusion  0.5 mg/min Intravenous Continuous Tam Moran MD   Paused at 11/19/24 1247    No lozenges or gum should be given while patient on BIPAP/AVAPS/AVAPS AE   Does not apply Continuous PRN Le Nguyen MD        Patient may continue current oral medications   Does not apply Continuous PRN Le Nguyen MD         Current Facility-Administered Medications   Medication Dose Route Frequency Provider Last Rate Last Admin    budesonide (PULMICORT) neb solution 1 mg  1 mg Nebulization BID Сергей Bolton MD   1 mg at 11/20/24 0720    [Held by provider] diltiazem ER (CARDIZEM SR) 12 hr capsule 60 mg  60 mg Oral BID Maribel Godwin MD   60 mg at 11/19/24 1316    doxazosin (CARDURA) tablet 4 mg  4 mg Oral At Bedtime Soto Fernandes MD   4 mg at 11/19/24 2107    levalbuterol (XOPENEX) neb solution 1.25 mg  1.25 mg Nebulization Q4H Bill Villalpando MD   1.25 mg at 11/20/24 1050    levofloxacin (LEVAQUIN) infusion 500 mg  500 mg Intravenous Q48H Soto Fernandes  mL/hr at 11/19/24 0847 500 mg at 11/19/24 0847    Lidocaine (LIDOCARE) 4 % Patch 1 patch  1 patch Transdermal Q24H Maribel Godwin MD   1 patch at 11/20/24 1004    loratadine (CLARITIN) tablet 10 mg  10 mg Oral Daily Soto Fernandes MD   10 mg at 11/20/24 1004    magnesium oxide (MAG-OX) tablet 400 mg  400 mg Oral Daily Soto Fernandes MD   400 mg at 11/20/24 1004    metoprolol succinate ER (TOPROL XL) 24 hr tablet 75 mg  75 mg  Oral Daily Maribel Godwin MD   75 mg at 11/20/24 1004    senna-docusate (SENOKOT-S/PERICOLACE) 8.6-50 MG per tablet 1 tablet  1 tablet Oral BID Soto Fernandes MD   1 tablet at 11/18/24 0930    Or    senna-docusate (SENOKOT-S/PERICOLACE) 8.6-50 MG per tablet 2 tablet  2 tablet Oral BID Soto Fernandes MD   2 tablet at 11/20/24 1004    sodium chloride (PF) 0.9% PF flush 3 mL  3 mL Intracatheter Q8H Soto Fernandes MD   3 mL at 11/20/24 1005       Data   Reviewed.       Jonathon Garrett PA-C   11/20/2024

## 2024-11-20 NOTE — PLAN OF CARE
Goal Outcome Evaluation:  Neuro: A&Ox4  Vitals/Pain: VSS on 10L oxymask. Int pain in right side, pt reports relief with repositioning.  Tele: SR w/ PVC and ST abnormality  Diet: reg  Lungs: coarse, Pulmonary toilet encouraged.  Lines/Drains: PIV S/L  Skin/Wounds: skin intact ex some bruising  GI/: no BM- senna and miralax given, passing gas.Voids adequately via purewick.  Ambulation/Assist: Ax1 gb/walker.  Plan: continue to wean O2 as able, goals of care.

## 2024-11-20 NOTE — PLAN OF CARE
Goal Outcome Evaluation:      Plan of Care Reviewed With: patient    Overall Patient Progress: no changeOverall Patient Progress: no change         IMC. Alert and oriented x4, forgetful. Vital signs stable on HFNC 50% 40L. Up with 1-2 GB/walker. Tolerating diet. Lungs sounds coarse, expiratory wheezes. Bowel sounds +. Passing flatus, BM -. Senna and PRN Miralax given. Voiding adequately via purewick. Pain managed with Tylenol. Denies nausea. Neuro's and CMS intact. Tele Afib RVR at start of shift, PRN Metoprolol given; converted to SR at 0202. PIV SL.

## 2024-11-21 ENCOUNTER — APPOINTMENT (OUTPATIENT)
Dept: PHYSICAL THERAPY | Facility: CLINIC | Age: 89
DRG: 189 | End: 2024-11-21
Payer: MEDICARE

## 2024-11-21 ENCOUNTER — APPOINTMENT (OUTPATIENT)
Dept: OCCUPATIONAL THERAPY | Facility: CLINIC | Age: 89
End: 2024-11-21
Payer: MEDICARE

## 2024-11-21 VITALS
WEIGHT: 135.14 LBS | DIASTOLIC BLOOD PRESSURE: 73 MMHG | RESPIRATION RATE: 22 BRPM | TEMPERATURE: 98.5 F | BODY MASS INDEX: 23.07 KG/M2 | SYSTOLIC BLOOD PRESSURE: 160 MMHG | HEIGHT: 64 IN | HEART RATE: 58 BPM | OXYGEN SATURATION: 93 %

## 2024-11-21 LAB
ANION GAP SERPL CALCULATED.3IONS-SCNC: 8 MMOL/L (ref 7–15)
BUN SERPL-MCNC: 31.8 MG/DL (ref 8–23)
CALCIUM SERPL-MCNC: 9.2 MG/DL (ref 8.8–10.4)
CHLORIDE SERPL-SCNC: 104 MMOL/L (ref 98–107)
CREAT SERPL-MCNC: 1.22 MG/DL (ref 0.51–0.95)
EGFRCR SERPLBLD CKD-EPI 2021: 41 ML/MIN/1.73M2
GLUCOSE SERPL-MCNC: 100 MG/DL (ref 70–99)
HCO3 SERPL-SCNC: 25 MMOL/L (ref 22–29)
POTASSIUM SERPL-SCNC: 4.5 MMOL/L (ref 3.4–5.3)
SODIUM SERPL-SCNC: 137 MMOL/L (ref 135–145)

## 2024-11-21 PROCEDURE — 250N000013 HC RX MED GY IP 250 OP 250 PS 637: Performed by: STUDENT IN AN ORGANIZED HEALTH CARE EDUCATION/TRAINING PROGRAM

## 2024-11-21 PROCEDURE — 250N000011 HC RX IP 250 OP 636: Performed by: STUDENT IN AN ORGANIZED HEALTH CARE EDUCATION/TRAINING PROGRAM

## 2024-11-21 PROCEDURE — 94640 AIRWAY INHALATION TREATMENT: CPT

## 2024-11-21 PROCEDURE — 36415 COLL VENOUS BLD VENIPUNCTURE: CPT | Performed by: STUDENT IN AN ORGANIZED HEALTH CARE EDUCATION/TRAINING PROGRAM

## 2024-11-21 PROCEDURE — 97530 THERAPEUTIC ACTIVITIES: CPT | Mod: GP

## 2024-11-21 PROCEDURE — 80048 BASIC METABOLIC PNL TOTAL CA: CPT | Performed by: STUDENT IN AN ORGANIZED HEALTH CARE EDUCATION/TRAINING PROGRAM

## 2024-11-21 PROCEDURE — 250N000013 HC RX MED GY IP 250 OP 250 PS 637: Performed by: HOSPITALIST

## 2024-11-21 PROCEDURE — 99232 SBSQ HOSP IP/OBS MODERATE 35: CPT | Performed by: STUDENT IN AN ORGANIZED HEALTH CARE EDUCATION/TRAINING PROGRAM

## 2024-11-21 PROCEDURE — 97116 GAIT TRAINING THERAPY: CPT | Mod: GP

## 2024-11-21 PROCEDURE — 94640 AIRWAY INHALATION TREATMENT: CPT | Mod: 76

## 2024-11-21 PROCEDURE — 250N000013 HC RX MED GY IP 250 OP 250 PS 637: Performed by: PHYSICIAN ASSISTANT

## 2024-11-21 PROCEDURE — 250N000009 HC RX 250: Performed by: STUDENT IN AN ORGANIZED HEALTH CARE EDUCATION/TRAINING PROGRAM

## 2024-11-21 PROCEDURE — 120N000001 HC R&B MED SURG/OB

## 2024-11-21 PROCEDURE — 97535 SELF CARE MNGMENT TRAINING: CPT | Mod: GO

## 2024-11-21 PROCEDURE — 999N000157 HC STATISTIC RCP TIME EA 10 MIN

## 2024-11-21 PROCEDURE — 250N000009 HC RX 250: Performed by: INTERNAL MEDICINE

## 2024-11-21 RX ADMIN — LEVALBUTEROL HYDROCHLORIDE 1.25 MG: 1.25 SOLUTION RESPIRATORY (INHALATION) at 07:39

## 2024-11-21 RX ADMIN — BUDESONIDE 1 MG: 1 INHALANT ORAL at 07:39

## 2024-11-21 RX ADMIN — LORATADINE 10 MG: 10 TABLET ORAL at 08:34

## 2024-11-21 RX ADMIN — AMIODARONE HYDROCHLORIDE 200 MG: 200 TABLET ORAL at 08:34

## 2024-11-21 RX ADMIN — LEVALBUTEROL HYDROCHLORIDE 1.25 MG: 1.25 SOLUTION RESPIRATORY (INHALATION) at 20:51

## 2024-11-21 RX ADMIN — SENNOSIDES AND DOCUSATE SODIUM 2 TABLET: 50; 8.6 TABLET ORAL at 22:26

## 2024-11-21 RX ADMIN — ACETAMINOPHEN 650 MG: 325 TABLET, FILM COATED ORAL at 22:26

## 2024-11-21 RX ADMIN — DOXAZOSIN 4 MG: 2 TABLET ORAL at 22:26

## 2024-11-21 RX ADMIN — BUDESONIDE 1 MG: 1 INHALANT ORAL at 20:52

## 2024-11-21 RX ADMIN — SENNOSIDES AND DOCUSATE SODIUM 1 TABLET: 50; 8.6 TABLET ORAL at 08:34

## 2024-11-21 RX ADMIN — POLYETHYLENE GLYCOL 3350 17 G: 17 POWDER, FOR SOLUTION ORAL at 08:35

## 2024-11-21 RX ADMIN — Medication 400 MG: at 08:34

## 2024-11-21 RX ADMIN — LEVALBUTEROL HYDROCHLORIDE 1.25 MG: 1.25 SOLUTION RESPIRATORY (INHALATION) at 11:34

## 2024-11-21 RX ADMIN — LEVOFLOXACIN 500 MG: 5 INJECTION, SOLUTION INTRAVENOUS at 08:43

## 2024-11-21 RX ADMIN — METOPROLOL SUCCINATE 100 MG: 100 TABLET, EXTENDED RELEASE ORAL at 08:34

## 2024-11-21 RX ADMIN — LEVALBUTEROL HYDROCHLORIDE 1.25 MG: 1.25 SOLUTION RESPIRATORY (INHALATION) at 15:51

## 2024-11-21 RX ADMIN — POLYETHYLENE GLYCOL 3350 17 G: 17 POWDER, FOR SOLUTION ORAL at 22:26

## 2024-11-21 NOTE — PLAN OF CARE
[id number]    Orientations: A&OX4  Vitals: VSS on 3L NC. LS coarse/dim. Intermittent expiratory wheezing. Fair IS use  Pain: Denies pain  Tele: SB  GI/: Adequate uop. +BS, +flatus, -BM. Pt requesting suppository tomorrow a.m.  Diet: Tolerating regular diet. Denies nausea.   Ambulation/Assist: A1GBW. Actively participating in therapies.   Skin/Wounds: Skin intact. Scattered bruising.   LDA: PIV X1 SL.   Labs: Routine  Plan: Continue to monitor

## 2024-11-21 NOTE — PLAN OF CARE
Goal Outcome Evaluation:      Plan of Care Reviewed With: patient    Overall Patient Progress: no changeOverall Patient Progress: no change       IMC. Alert and oriented x4, forgetful. Vital signs stable on 5L O2 via Oxymask. Up with 1-2 GB/walker. Tolerating diet. Lungs sounds coarse, expiratory wheezes. Bowel sounds +. Passing flatus, BM -. Senna and Miralax given. Voiding adequately via purewick. Pain managed with Tylenol. Denies nausea. Neuro's and CMS intact. Tele NSR. PIV LACEY.

## 2024-11-21 NOTE — PROGRESS NOTES
[facility]  Hospitalist Progress Note    Assessment & Plan   Angely Bryan is a 89 year old female with history of hypertension, hyperlipidemia, atrial fibrillation, CKD 3, CVA, GERD, ICH, breast cancer, low back pain, and COPD who was admitted on 11/15/24 for acute hypoxic respiratory failure likely 2/2 COPD exacerbation.      Acute Hypoxic Respiratory Failure 2/2 COPD Exacerbation  Hx of rib fracture  Occasionally uses 2-3L of home O2 when ill, lately using it over the past 2 weeks or so. Productive cough and orthopnea. Breathing this morning felt much worse than recent days and she has been somewhat nauseous. No fevers, chills, or vomiting. Chronic chest pain from prior rib fracture noted but no new chest pain. No fever at home. She has reportedly been on doxy for the past couple days. In the ED, she received duonebs and solumedrol. WBC 11.4, LA: 0.9, BNP: 385, D dimer 0.60, WNL for age adjustment. CXR without acute infiltrate, no signs of pulm edema. Needing up to 4L and subsequently on bipap in ED. Patient had an RRT on 11/17/24 for chest pain. It was likely MSK as pain was reproducible on palpation. No new fx or findings on CXR overnight, EKG and trop not ischemic appearing.     - She confirmed she is DNR DNI and would not want pre arrest intubation either (son present for discussion in ED)     - Spo2 > 88%               - Currently on 3L NC               - Wean as able      - Continue scheduled and prn nebs  - Empiric abx given exacerbation, levofloxacin continued  - Continue steroid, solumedrol 60 bid for now x 2 days followed by prednisone 40 daily x 3 days   - Incentive spirometry      - B/L LE DVT US: Negative for DVT    - Pulmonary consulted                - Added budesonide nebs                - If she continues to have hypoxemia then may consider CT PE     Atrial Fibrillation, persistent   PSVT  Mildly tachycardic in ED. Not anticoagulated given history of intraparenchymal and  intraventricular bleed while on anticoagulation previously. Early morning on 11/19/24, patient had a 26 beat run of PSVT followed by Afib with RVR. Patient was asymptomatic. Was given 2.5mg IV Metoprolol with no improvement. She was given another 2.5mg IV Metoprolol followed by 10mg IV Dilt with no improvement. She was started on Amiodarone ggt. On 11/19/24 morning, patient continued to have elevated HR in the 130-140s. Cardiology consulted and recommended PO Diltiazem. During the afternoon on 11/19/24, patient had hypotension with increased HR of 140s. RRT was called. Initial plan was so cardiovert given unstability. Patient is DNI/DNR. Patient was not sure with cardioversion. Was given 125mcg IV Digoxin with minimal improvement in HR but some improvement with BP. House team had extensive goals of care conversation with patient and family but patient still uncertain with cardioversion. House team giving another 125mcg of digoxin. Was given PRN IV Metoprolol and patient converted to NSR on 11/20/24 around 2:02am.      - Tele      - Continue Metoprolol 100mg BID with hold parameters      - Cardiology consulted and signed off 11/20/24               - Increase metoprolol to 100 mg daily with hold parameters  - Discontinue diltiazem and digoxin   - Transition to PO amiodarone   - Unable to introduce anticoagulation given hx of brain bleed  - Outpatient follow up will be arranged    Constipation  - Bowel Regimen in place      Goals of Care  Attempted to discuss GOC with patient during rounds on 11/19/24. She stated that she would need to talk to her family first. She is unclear what she would want moving forward. She confirms DNR/DNI status. House team had in depth GOC discussion with patient and family during RRT on 11/19/24. Spoke to patient and daughter in law on 11/200/24 about the minimal progress with her oxygen but improvement in her HR. They both understand that her lungs are weak. Patient is not opposed to  considering hospice but at this time would like more time to think about it, see if she makes any improvements and talk to family.      - Continue to discuss     CKD III   - Cr: 1.15 > 1.19 > 1.24 > 1.13 > 1.24   - Baseline: 1.10-1.25  - Continue to monitor       Hypertension  Hyperlipidemia  BP mildly elevated in ED.  - Continue Metoprolol and Statin     History of mild cognitive impairment  Was noted to have mild memory loss and some gait impairment which had been improving in the past year acting as below she does have history of intraparenchymal and intraventricular bleed while on anticoagulation for A-fib.  - MoCA 23/30 in April 2024  - Reorient as needed, encourage sleep-wake cycles  - OT consult     Hx of intraparenchymal and intraventricular bleed while on anticoagulation for a fib (December 2023)  Noted. Neurosurgery evaluated at that time in hospital. [place] to have memory impairment and gait instability that was improving in past year or so.   - Not on AC, baby aspirin noted     Chronic low back pain  Appears at baseline.  - PTA regimen to continue    Clinically Significant Risk Factors         # Hyponatremia: Lowest Na = 134 mmol/L in last 2 days, will monitor as appropriate           # Hypertension: Noted on problem list     # Acute Hypercapnic Respiratory Failure: based on venous blood gas results.  Continue supplemental oxygen and ventilatory support as indicated.             # Financial/Environmental Concerns: none           Diet: Regular Diet Adult     DVT Prophylaxis: Pneumatic Compression Devices   Tuttle Catheter: Not present  Lines: None     Cardiac Monitoring: ACTIVE order. Indication: copd exacerbation, hx a fib, IMC  Code Status: No CPR- Do NOT Intubate      Disposition Plan       Expected Discharge Date: 11/24/2024,  3:00 PM    Destination: long-term care facility        Entered: Maribel Godwin MD 11/21/2024, 1:05 PM     Care Team Updated: Nursing and SW updated    Disposition:  Potentially tomorrow vs Saturday pending oxygen needs       Medically Ready for Discharge: Anticipated Tomorrow        Maribel Godwin MD  Hospitalist Service   [facility]  Securely message with the Vocera Web Console (learn more here)         Medical Decision Making       45 MINUTES SPENT BY ME on the date of service doing chart review, history, exam, documentation & further activities per the note.           Interval History     No acute overnight events.    This morning the patient states that she is doing well. She is feeling better. Does not feel SOB. Does feel constipated.     -Data reviewed today: I reviewed all new labs and imaging results over the last 24 hours.    Physical Exam   Temp: 97.8  F (36.6  C) Temp src: Oral BP: 122/58 Pulse: 54   Resp: 18 SpO2: 95 % O2 Device: Nasal cannula Oxygen Delivery: 3 LPM  Vitals:    11/18/24 0542 11/19/24 0606 11/21/24 0614   Weight: 62.1 kg (136 lb 12.8 oz) 59.3 kg (130 lb 11.7 oz) 61.3 kg (135 lb 2.3 oz)     Vital Signs with Ranges  Temp:  [97.5  F (36.4  C)-97.9  F (36.6  C)] 97.8  F (36.6  C)  Pulse:  [46-73] 54  Resp:  [12-29] 18  BP: (107-166)/(50-82) 122/58  SpO2:  [91 %-98 %] 95 %  I/O last 3 completed shifts:  In: 1060 [P.O.:1060]  Out: 600 [Urine:600]    Constitutional: Awake, alert, cooperative, no apparent distress.    HEENT: PERRL, Normocephalic, without obvious abnormality, atraumatic, oral pharynx with moist mucus membranes  Pulmonary: Bilateral course lungs sounds with wheezing  Cardiovascular: Irregular rate and rhythm, normal S1 and S2  GI: Normal bowel sounds, soft, non-distended, non-tender.  Skin/Integumen: Visualized skin appeared clear.  Neuro: CN II-XII grossly intact.  Psych:  Alert and oriented x 3.   Extremities: No lower extremity edema noted      Medications   Current Facility-Administered Medications   Medication Dose Route Frequency Provider Last Rate Last Admin    No lozenges or gum should be given while  patient on BIPAP/AVAPS/AVAPS AE   Does not apply Continuous PRN Le Nguyen MD        Patient may continue current oral medications   Does not apply Continuous PRN Le Nguyen MD         Current Facility-Administered Medications   Medication Dose Route Frequency Provider Last Rate Last Admin    amiodarone (PACERONE) tablet 200 mg  200 mg Oral BID Jonathon Garrett PA-C   200 mg at 11/21/24 0834    budesonide (PULMICORT) neb solution 1 mg  1 mg Nebulization BID Сергей Bolton MD   1 mg at 11/21/24 0739    doxazosin (CARDURA) tablet 4 mg  4 mg Oral At Bedtime Soto Fernandes MD   4 mg at 11/19/24 2107    levalbuterol (XOPENEX) neb solution 1.25 mg  1.25 mg Nebulization Q4H WA Bill Gr MD   1.25 mg at 11/21/24 1134    levofloxacin (LEVAQUIN) infusion 500 mg  500 mg Intravenous Q48H Maribel Godwin  mL/hr at 11/21/24 0843 500 mg at 11/21/24 0843    Lidocaine (LIDOCARE) 4 % Patch 1 patch  1 patch Transdermal Q24H Maribel Godwin MD   1 patch at 11/21/24 0843    loratadine (CLARITIN) tablet 10 mg  10 mg Oral Daily Soto Fernandes MD   10 mg at 11/21/24 0834    magnesium oxide (MAG-OX) tablet 400 mg  400 mg Oral Daily Soto Fernandes MD   400 mg at 11/21/24 0834    metoprolol succinate ER (TOPROL XL) 24 hr tablet 100 mg  100 mg Oral Daily Jonathon Garrett PA-C   100 mg at 11/21/24 0834    polyethylene glycol (MIRALAX) Packet 17 g  17 g Oral BID Maribel Godwin MD   17 g at 11/21/24 0835    senna-docusate (SENOKOT-S/PERICOLACE) 8.6-50 MG per tablet 1 tablet  1 tablet Oral BID Soto Fernandes MD   1 tablet at 11/21/24 0834    Or    senna-docusate (SENOKOT-S/PERICOLACE) 8.6-50 MG per tablet 2 tablet  2 tablet Oral BID Soto Fernandes MD   2 tablet at 11/20/24 2226    sodium chloride (PF) 0.9% PF flush 3 mL  3 mL Intracatheter Q8H Soto Fernandes MD   3 mL at 11/21/24 0843       Data   Recent Labs   Lab 11/20/24  2794 11/19/24  0620  11/18/24  0636 11/17/24  0502 11/16/24  0503 11/15/24  0730   WBC 14.1* 15.7* 15.1*  --    < > 11.4*   HGB 12.3 12.4 11.7  --    < > 12.2   MCV 92 95 94  --    < > 97    176 177  --    < > 185   * 135 134* 134*  134*   < > 137   POTASSIUM 4.3 4.2 4.9 4.8  4.8   < > 4.3   CHLORIDE 102 102 105 102  102   < > 103   CO2 27 25 23 24  24   < > 26   BUN 30.0* 34.0* 35.9* 33.1*  33.1*   < > 20.5   CR 1.24* 1.13* 1.24* 1.19*  1.19*   < > 1.15*   ANIONGAP 5* 8 6* 8  8   < > 8   CHANDNI 9.2 9.5 9.4 9.6  9.6   < > 9.5   GLC 88 95 120* 147*  147*   < > 114*   ALBUMIN  --   --   --  3.8  --  4.0   PROTTOTAL  --   --   --  6.9  --  7.2   BILITOTAL  --   --   --  0.2  --  0.2   ALKPHOS  --   --   --  72  --  82   ALT  --   --   --  13  --  10   AST  --   --   --  26  --  18   LIPASE  --   --   --  16  --   --     < > = values in this interval not displayed.       Recent Results (from the past 24 hours)   US Lower Extremity Venous Duplex Bilateral    Narrative    VENOUS ULTRASOUND BOTH LEGS  11/20/2024 3:18 PM     HISTORY: Shortness of breath, hypoxia.    COMPARISON: None.    FINDINGS:  Examination of the deep veins with graded compression and  color flow Doppler with spectral wave form analysis was performed.      Impression    IMPRESSION: No evidence of deep venous thrombosis.    CRISTIAN MAE MD         SYSTEM ID:  [id number]

## 2024-11-22 ENCOUNTER — APPOINTMENT (OUTPATIENT)
Dept: PHYSICAL THERAPY | Facility: CLINIC | Age: 89
End: 2024-11-22
Payer: MEDICARE

## 2024-11-22 PROCEDURE — 250N000009 HC RX 250: Performed by: INTERNAL MEDICINE

## 2024-11-22 PROCEDURE — 250N000013 HC RX MED GY IP 250 OP 250 PS 637: Performed by: HOSPITALIST

## 2024-11-22 PROCEDURE — 94640 AIRWAY INHALATION TREATMENT: CPT

## 2024-11-22 PROCEDURE — 250N000009 HC RX 250: Performed by: STUDENT IN AN ORGANIZED HEALTH CARE EDUCATION/TRAINING PROGRAM

## 2024-11-22 PROCEDURE — 250N000009 HC RX 250: Performed by: HOSPITALIST

## 2024-11-22 PROCEDURE — 250N000013 HC RX MED GY IP 250 OP 250 PS 637: Performed by: STUDENT IN AN ORGANIZED HEALTH CARE EDUCATION/TRAINING PROGRAM

## 2024-11-22 PROCEDURE — 250N000013 HC RX MED GY IP 250 OP 250 PS 637: Performed by: PHYSICIAN ASSISTANT

## 2024-11-22 PROCEDURE — 94640 AIRWAY INHALATION TREATMENT: CPT | Mod: 76

## 2024-11-22 PROCEDURE — 999N000157 HC STATISTIC RCP TIME EA 10 MIN

## 2024-11-22 PROCEDURE — 99233 SBSQ HOSP IP/OBS HIGH 50: CPT | Performed by: STUDENT IN AN ORGANIZED HEALTH CARE EDUCATION/TRAINING PROGRAM

## 2024-11-22 PROCEDURE — 97116 GAIT TRAINING THERAPY: CPT | Mod: GP

## 2024-11-22 PROCEDURE — 120N000001 HC R&B MED SURG/OB

## 2024-11-22 RX ORDER — LEVOFLOXACIN 5 MG/ML
750 INJECTION, SOLUTION INTRAVENOUS
Status: DISCONTINUED | OUTPATIENT
Start: 2024-11-23 | End: 2024-11-24 | Stop reason: HOSPADM

## 2024-11-22 RX ADMIN — POLYETHYLENE GLYCOL 3350 17 G: 17 POWDER, FOR SOLUTION ORAL at 21:23

## 2024-11-22 RX ADMIN — METOPROLOL SUCCINATE 100 MG: 100 TABLET, EXTENDED RELEASE ORAL at 10:04

## 2024-11-22 RX ADMIN — SENNOSIDES AND DOCUSATE SODIUM 2 TABLET: 50; 8.6 TABLET ORAL at 21:23

## 2024-11-22 RX ADMIN — LIDOCAINE 1 PATCH: 4 PATCH TOPICAL at 10:16

## 2024-11-22 RX ADMIN — SENNOSIDES AND DOCUSATE SODIUM 2 TABLET: 50; 8.6 TABLET ORAL at 10:04

## 2024-11-22 RX ADMIN — DOXAZOSIN 4 MG: 2 TABLET ORAL at 21:24

## 2024-11-22 RX ADMIN — AMIODARONE HYDROCHLORIDE 200 MG: 200 TABLET ORAL at 10:04

## 2024-11-22 RX ADMIN — LEVALBUTEROL HYDROCHLORIDE 1.25 MG: 1.25 SOLUTION RESPIRATORY (INHALATION) at 20:28

## 2024-11-22 RX ADMIN — GUAIFENESIN 200 MG: 200 SOLUTION ORAL at 10:30

## 2024-11-22 RX ADMIN — ALBUTEROL SULFATE 2.5 MG: 2.5 SOLUTION RESPIRATORY (INHALATION) at 02:56

## 2024-11-22 RX ADMIN — BUDESONIDE 1 MG: 1 INHALANT ORAL at 07:35

## 2024-11-22 RX ADMIN — BUDESONIDE 1 MG: 1 INHALANT ORAL at 20:28

## 2024-11-22 RX ADMIN — LEVALBUTEROL HYDROCHLORIDE 1.25 MG: 1.25 SOLUTION RESPIRATORY (INHALATION) at 07:35

## 2024-11-22 RX ADMIN — AMIODARONE HYDROCHLORIDE 200 MG: 200 TABLET ORAL at 21:24

## 2024-11-22 RX ADMIN — LORATADINE 10 MG: 10 TABLET ORAL at 10:04

## 2024-11-22 RX ADMIN — Medication 400 MG: at 10:04

## 2024-11-22 RX ADMIN — LEVALBUTEROL HYDROCHLORIDE 1.25 MG: 1.25 SOLUTION RESPIRATORY (INHALATION) at 11:51

## 2024-11-22 RX ADMIN — POLYETHYLENE GLYCOL 3350 17 G: 17 POWDER, FOR SOLUTION ORAL at 10:05

## 2024-11-22 NOTE — PROGRESS NOTES
"BRIEF NUTRITION ASSESSMENT    REASON FOR ASSESSMENT:  Angely Bryan is a 89 year old female seen by Registered Dietitian for LOS    NUTRITION HISTORY:  Pt reports brief period of poor appetite prior to admission.   Comes from [facility]. She said sometimes she would get Ensure there.     CURRENT DIET AND INTAKE:  Diet:  Regular diet            Low appetite for ~3 days this admission per patient, now eating much better. Family in room agree that meals have been adequate. They are bringing her Atkins shakes, which she's been drinking the past 3 days.   She would like Ensure sent, and hopes to have an order for it at [facility].     ANTHROPOMETRICS:  Height: 5' 4\"  Weight:  134 lbs 7.69 oz (61 kg)   Body mass index is 23.08 kg/m .   Weight Status: Normal BMI  IBW:  54.5 kg   %IBW: 112%  Weight History: no wt loss since December - wt actually up about 5#.   Wt Readings from Last 10 Encounters:   11/22/24 61 kg (134 lb 7.7 oz)   12/23/23 58.8 kg (129 lb 10.1 oz)   08/19/15 76.2 kg (167 lb 15.9 oz)       LABS:  Reviewed    MALNUTRITION:  Visual Nutrition Focused Physical Assessment (NFPA) completed due to restrictions on face-to-face patient care during COVID-19 Pandemic.  Patient does not meet two of the following criteria necessary for diagnosing malnutrition.     % Weight Loss:  None noted  % Intake:  Decreased intake does not meet criteria for malnutrition   Subcutaneous Fat Loss:  no acute changes suspected  Muscle Loss:  no acute changes suspected   Fluid Retention:  None noted    NUTRITION INTERVENTION:  Nutrition Diagnosis:  No nutrition diagnosis at this time.    Implementation:  Nutrition Education:  Discussed adequate PO, and supplements to help improve energy/protein intakes.   Medical Food Supplement: Recommend Ensure 1-2 x daily.     FOLLOW UP/MONITORING:   Will re-evaluate in 7 - 10 days, or sooner, if re-consulted.    Olga Lidia Kerns RD, LD  Pager: [phone]  Available on Vocera        "

## 2024-11-22 NOTE — PROGRESS NOTES
[facility]  Hospitalist Progress Note    Assessment & Plan   Angely Bryan is a 89 year old female with history of hypertension, hyperlipidemia, atrial fibrillation, CKD 3, CVA, GERD, ICH, breast cancer, low back pain, and COPD who was admitted on 11/15/24 for acute hypoxic respiratory failure likely 2/2 COPD exacerbation.      Acute Hypoxic Respiratory Failure 2/2 COPD Exacerbation  Hx of rib fracture  Occasionally uses 2-3L of home O2 when ill, lately using it over the past 2 weeks or so. Productive cough and orthopnea. Breathing this morning felt much worse than recent days and she has been somewhat nauseous. No fevers, chills, or vomiting. Chronic chest pain from prior rib fracture noted but no new chest pain. No fever at home. She has reportedly been on doxy for the past couple days. In the ED, she received duonebs and solumedrol. WBC 11.4, LA: 0.9, BNP: 385, D dimer 0.60, WNL for age adjustment. CXR without acute infiltrate, no signs of pulm edema. Needing up to 4L and subsequently on bipap in ED. Patient had an RRT on 11/17/24 for chest pain. It was likely MSK as pain was reproducible on palpation. No new fx or findings on CXR overnight, EKG and trop not ischemic appearing. Was able to be weaned down to 3L NC by 11/21/24. On the evening of 11/21/24, patient drops her sats to 76% while sleeping. Unclear of all the events but patient was eventually placed back on HFNC. Patient states that she felt fine during this event. Was able to come back down to 5L NC on 11/22/24 morning.      - She confirmed she is DNR DNI and would not want pre arrest intubation either (son present for discussion in ED)     - Spo2 > 88%               - Currently on 5L NC    - Wear oxygen mask at night as patient is a mouth breather               - Wean as able      - Continue scheduled and prn nebs  - Empiric abx given exacerbation, levofloxacin continued for a total of 5 doses   - Continue steroid, solumedrol 60  bid for now x 2 days followed by prednisone 40 daily x 3 days   - Incentive spirometry      - B/L LE DVT US: Negative for DVT     - Pulmonary consulted                - Added budesonide nebs                - If she continues to have hypoxemia then may consider CT PE     Atrial Fibrillation, persistent   PSVT  Mildly tachycardic in ED. Not anticoagulated given history of intraparenchymal and intraventricular bleed while on anticoagulation previously. Early morning on 11/19/24, patient had a 26 beat run of PSVT followed by Afib with RVR. Patient was asymptomatic. Was given 2.5mg IV Metoprolol with no improvement. She was given another 2.5mg IV Metoprolol followed by 10mg IV Dilt with no improvement. She was started on Amiodarone ggt. On 11/19/24 morning, patient continued to have elevated HR in the 130-140s. Cardiology consulted and recommended PO Diltiazem. During the afternoon on 11/19/24, patient had hypotension with increased HR of 140s. RRT was called. Initial plan was so cardiovert given unstability. Patient is DNI/DNR. Patient was not sure with cardioversion. Was given 125mcg IV Digoxin with minimal improvement in HR but some improvement with BP. House team had extensive goals of care conversation with patient and family but patient still uncertain with cardioversion. House team giving another 125mcg of digoxin. Was given PRN IV Metoprolol and patient converted to NSR on 11/20/24 around 2:02am.      - Tele      - Continue Metoprolol 100mg BID with hold parameters      - Cardiology consulted and signed off 11/20/24               - Increase metoprolol to 100 mg daily with hold parameters  - Discontinue diltiazem and digoxin   - Transition to PO amiodarone   - Unable to introduce anticoagulation given hx of brain bleed  - Outpatient follow up will be arranged     Constipation  - Bowel Regimen in place       Goals of Care  Attempted to discuss GOC with patient during rounds on 11/19/24. She stated that she would  need to talk to her family first. She is unclear what she would want moving forward. She confirms DNR/DNI status. House team had in depth GOC discussion with patient and family during RRT on 11/19/24. Spoke to patient and daughter in law on 11/200/24 about the minimal progress with her oxygen but improvement in her HR. They both understand that her lungs are weak. Patient is not opposed to considering hospice but at this time would like more time to think about it, see if she makes any improvements and talk to family.      - Continue to discuss     CKD III   - Cr: 1.15 > 1.19 > 1.24 > 1.13 > 1.24 > 1.22  - Baseline: 1.10-1.25  - Continue to monitor       Hypertension  Hyperlipidemia  BP mildly elevated in ED.  - Continue Metoprolol and Statin     History of mild cognitive impairment  Was noted to have mild memory loss and some gait impairment which had been improving in the past year acting as below she does have history of intraparenchymal and intraventricular bleed while on anticoagulation for A-fib.  - MoCA 23/30 in April 2024  - Reorient as needed, encourage sleep-wake cycles  - OT consult     Hx of intraparenchymal and intraventricular bleed while on anticoagulation for a fib (December 2023)  Noted. Neurosurgery evaluated at that time in hospital. [place] to have memory impairment and gait instability that was improving in past year or so.   - Not on AC, baby aspirin noted     Chronic low back pain  Appears at baseline.  - PTA regimen to continue    Clinically Significant Risk Factors                   # Hypertension: Noted on problem list     # Acute Hypercapnic Respiratory Failure: based on venous blood gas results.  Continue supplemental oxygen and ventilatory support as indicated.             # Financial/Environmental Concerns: none           Diet: Regular Diet Adult  Snacks/Supplements Adult: Ensure Enlive; Between Meals     DVT Prophylaxis: Pneumatic Compression Devices   Tuttle Catheter: Not present  Lines:  None     Cardiac Monitoring: ACTIVE order. Indication: copd exacerbation, hx a fib, IMC  Code Status: No CPR- Do NOT Intubate      Disposition Plan       Expected Discharge Date: 11/23/2024,  3:00 PM    Destination: long-term care facility        Entered: Maribel Godwin MD 11/22/2024, 1:43 PM     Family Updated: Spoke to family at bedside on 11/22/24    Care Team Updated: Nursing updated     Disposition: Likely 1-2 days pending oxygen stability.       Medically Ready for Discharge: Anticipated in 2-4 Days        Maribel Godwin MD  Hospitalist Service   [facility]  Securely message with the Vocera Web Console (learn more here)         Medical Decision Making       50 MINUTES SPENT BY ME on the date of service doing chart review, history, exam, documentation & further activities per the note.           Interval History     On the evening of 11/21/24, patient drops her sats to 76% while sleeping. Unclear of all the events but patient was eventually placed back on HFNC. Patient states that she felt fine during this event. Was able to come back down to 5L NC on 11/22/24 morning.     This morning the patient states that she is feeling well. States that she has been having a productive cough.     -Data reviewed today: I reviewed all new labs and imaging results over the last 24 hours.     Physical Exam   Temp: 97.4  F (36.3  C) Temp src: Oral BP: 131/58 Pulse: 60   Resp: 20 SpO2: 92 % O2 Device: Nasal cannula Oxygen Delivery: 4 LPM  Vitals:    11/19/24 0606 11/21/24 0614 11/22/24 0532   Weight: 59.3 kg (130 lb 11.7 oz) 61.3 kg (135 lb 2.3 oz) 61 kg (134 lb 7.7 oz)     Vital Signs with Ranges  Temp:  [97.4  F (36.3  C)-98.7  F (37.1  C)] 97.4  F (36.3  C)  Pulse:  [55-74] 60  Resp:  [14-22] 20  BP: (131-161)/() 131/58  FiO2 (%):  [50 %-95 %] 50 %  SpO2:  [84 %-97 %] 92 %  I/O last 3 completed shifts:  In: 600 [P.O.:600]  Out: 600 [Urine:600]      Constitutional: Awake, alert,  cooperative, no apparent distress.    HEENT: PERRL, Normocephalic, without obvious abnormality, atraumatic, oral pharynx with moist mucus membranes  Pulmonary: Bilateral course lungs sounds with wheezing  Cardiovascular: Irregular rate and rhythm, normal S1 and S2  GI: Normal bowel sounds, soft, non-distended, non-tender.  Skin/Integumen: Visualized skin appeared clear.  Neuro: CN II-XII grossly intact.  Psych:  Alert and oriented x 3.   Extremities: No lower extremity edema noted      Medications   Current Facility-Administered Medications   Medication Dose Route Frequency Provider Last Rate Last Admin    No lozenges or gum should be given while patient on BIPAP/AVAPS/AVAPS AE   Does not apply Continuous PRN Le Nguyen MD        Patient may continue current oral medications   Does not apply Continuous PRN Le Nguyen MD         Current Facility-Administered Medications   Medication Dose Route Frequency Provider Last Rate Last Admin    amiodarone (PACERONE) tablet 200 mg  200 mg Oral BID Jonathon Garrett PA-C   200 mg at 11/22/24 1004    budesonide (PULMICORT) neb solution 1 mg  1 mg Nebulization BID Сергей Bolton MD   1 mg at 11/22/24 0735    doxazosin (CARDURA) tablet 4 mg  4 mg Oral At Bedtime Soto Fernandes MD   4 mg at 11/21/24 2226    levalbuterol (XOPENEX) neb solution 1.25 mg  1.25 mg Nebulization Q4H WA Bill Gr MD   1.25 mg at 11/22/24 1151    [START ON 11/23/2024] levofloxacin (LEVAQUIN) infusion 750 mg  750 mg Intravenous Q48H Iris Brown, [facility]        Lidocaine (LIDOCARE) 4 % Patch 1 patch  1 patch Transdermal Q24H Maribel Godwin MD   1 patch at 11/22/24 1016    loratadine (CLARITIN) tablet 10 mg  10 mg Oral Daily Soto Fernandes MD   10 mg at 11/22/24 1004    magnesium oxide (MAG-OX) tablet 400 mg  400 mg Oral Daily Soto Fernandes MD   400 mg at 11/22/24 1004    metoprolol succinate ER (TOPROL XL) 24 hr tablet 100 mg  100 mg Oral Daily  Jonathon Garrett PA-C   100 mg at 11/22/24 1004    polyethylene glycol (MIRALAX) Packet 17 g  17 g Oral BID Maribel Godwin MD   17 g at 11/22/24 1005    senna-docusate (SENOKOT-S/PERICOLACE) 8.6-50 MG per tablet 1 tablet  1 tablet Oral BID Soto Fernandes MD   1 tablet at 11/21/24 0834    Or    senna-docusate (SENOKOT-S/PERICOLACE) 8.6-50 MG per tablet 2 tablet  2 tablet Oral BID Soto Fernandes MD   2 tablet at 11/22/24 1004    sodium chloride (PF) 0.9% PF flush 3 mL  3 mL Intracatheter Q8H Soto Fernandes MD   3 mL at 11/22/24 1011       Data   Recent Labs   Lab 11/21/24  1542 11/20/24  0444 11/19/24  0620 11/18/24  0636 11/17/24  0502   WBC  --  14.1* 15.7* 15.1*  --    HGB  --  12.3 12.4 11.7  --    MCV  --  92 95 94  --    PLT  --  174 176 177  --     134* 135 134* 134*  134*   POTASSIUM 4.5 4.3 4.2 4.9 4.8  4.8   CHLORIDE 104 102 102 105 102  102   CO2 25 27 25 23 24  24   BUN 31.8* 30.0* 34.0* 35.9* 33.1*  33.1*   CR 1.22* 1.24* 1.13* 1.24* 1.19*  1.19*   ANIONGAP 8 5* 8 6* 8  8   CHANDNI 9.2 9.2 9.5 9.4 9.6  9.6   * 88 95 120* 147*  147*   ALBUMIN  --   --   --   --  3.8   PROTTOTAL  --   --   --   --  6.9   BILITOTAL  --   --   --   --  0.2   ALKPHOS  --   --   --   --  72   ALT  --   --   --   --  13   AST  --   --   --   --  26   LIPASE  --   --   --   --  16       No results found for this or any previous visit (from the past 24 hours).

## 2024-11-22 NOTE — PLAN OF CARE
"Goal Outcome Evaluation:  Patient Name: Zulay  MRN: [id number]  Date of Admission: 11/15/2024  Reason for Admission: Hypoxic Respiratory Failure  Level of Care: Acute    Vitals:   BP Readings from Last 1 Encounters:   11/22/24 134/67     Pulse Readings from Last 1 Encounters:   11/21/24 58     Wt Readings from Last 1 Encounters:   11/22/24 61 kg (134 lb 7.7 oz)     Ht Readings from Last 1 Encounters:   11/15/24 1.626 m (5' 4\")     Estimated body mass index is 23.08 kg/m  as calculated from the following:    Height as of this encounter: 1.626 m (5' 4\").    Weight as of this encounter: 61 kg (134 lb 7.7 oz).  Temp Readings from Last 1 Encounters:   11/22/24 97.8  F (36.6  C) (Oral)       Pain: Pain goal 0 Pain Rating 0 Effective pain medication/regimen Denies pain    CV Surgery Patient: No    Assessment    Resp: On 3 L oxygen via NC at the beginning of the shift. Oxygen sats dropped to 76 while asleep, requiring switching to High Flow  Telemetry: SR - SB  Neuro: Aox4, Forgetful  GI/: Pure-wick. No BM, on GI regimen. Refused suppository last night, requested to have it during the day  Skin/Wounds: Scattered bruising  Lines/Drains: PIV S/L  Activity: x1 Assist + GBW  Sleep: Increased oxygen demand while asleep  Abnormal Labs:     Aggression Stop Light: Green          Patient Care Plan: Pending discharge to Longterm care                        "

## 2024-11-22 NOTE — PLAN OF CARE
Alert and oriented x4, forgetful. Vital signs stable on 3L O2 via NC. Up with 1 GB/walker. Tolerating diet. Lungs sounds coarse, diminished. Bowel sounds +. Passing flatus, BM -. Senna and Miralax given. Voiding adequately via purewick. Pain managed with Tylenol. Denies nausea. Neuro's and CMS intact. Tele NSR. PIV SL. Possible discharge home tomorrow.

## 2024-11-23 PROCEDURE — 99232 SBSQ HOSP IP/OBS MODERATE 35: CPT | Performed by: STUDENT IN AN ORGANIZED HEALTH CARE EDUCATION/TRAINING PROGRAM

## 2024-11-23 PROCEDURE — 250N000009 HC RX 250: Performed by: STUDENT IN AN ORGANIZED HEALTH CARE EDUCATION/TRAINING PROGRAM

## 2024-11-23 PROCEDURE — 94640 AIRWAY INHALATION TREATMENT: CPT | Mod: 76

## 2024-11-23 PROCEDURE — 250N000009 HC RX 250: Performed by: INTERNAL MEDICINE

## 2024-11-23 PROCEDURE — 999N000157 HC STATISTIC RCP TIME EA 10 MIN

## 2024-11-23 PROCEDURE — 94640 AIRWAY INHALATION TREATMENT: CPT

## 2024-11-23 PROCEDURE — 250N000013 HC RX MED GY IP 250 OP 250 PS 637: Performed by: PHYSICIAN ASSISTANT

## 2024-11-23 PROCEDURE — 250N000013 HC RX MED GY IP 250 OP 250 PS 637: Performed by: STUDENT IN AN ORGANIZED HEALTH CARE EDUCATION/TRAINING PROGRAM

## 2024-11-23 PROCEDURE — 250N000011 HC RX IP 250 OP 636

## 2024-11-23 PROCEDURE — 250N000013 HC RX MED GY IP 250 OP 250 PS 637: Performed by: HOSPITALIST

## 2024-11-23 PROCEDURE — 120N000001 HC R&B MED SURG/OB

## 2024-11-23 RX ADMIN — METOPROLOL SUCCINATE 100 MG: 100 TABLET, EXTENDED RELEASE ORAL at 09:58

## 2024-11-23 RX ADMIN — DOXAZOSIN 4 MG: 2 TABLET ORAL at 21:45

## 2024-11-23 RX ADMIN — ACETAMINOPHEN 650 MG: 325 TABLET, FILM COATED ORAL at 21:44

## 2024-11-23 RX ADMIN — LIDOCAINE 1 PATCH: 4 PATCH TOPICAL at 10:11

## 2024-11-23 RX ADMIN — BUDESONIDE 1 MG: 1 INHALANT ORAL at 20:35

## 2024-11-23 RX ADMIN — POLYETHYLENE GLYCOL 3350 17 G: 17 POWDER, FOR SOLUTION ORAL at 09:58

## 2024-11-23 RX ADMIN — ACETAMINOPHEN 650 MG: 325 TABLET, FILM COATED ORAL at 12:35

## 2024-11-23 RX ADMIN — LEVALBUTEROL HYDROCHLORIDE 1.25 MG: 1.25 SOLUTION RESPIRATORY (INHALATION) at 15:18

## 2024-11-23 RX ADMIN — SENNOSIDES AND DOCUSATE SODIUM 1 TABLET: 50; 8.6 TABLET ORAL at 09:57

## 2024-11-23 RX ADMIN — LEVALBUTEROL HYDROCHLORIDE 1.25 MG: 1.25 SOLUTION RESPIRATORY (INHALATION) at 20:35

## 2024-11-23 RX ADMIN — SENNOSIDES AND DOCUSATE SODIUM 1 TABLET: 50; 8.6 TABLET ORAL at 21:45

## 2024-11-23 RX ADMIN — Medication 400 MG: at 09:58

## 2024-11-23 RX ADMIN — AMIODARONE HYDROCHLORIDE 200 MG: 200 TABLET ORAL at 21:45

## 2024-11-23 RX ADMIN — AMIODARONE HYDROCHLORIDE 200 MG: 200 TABLET ORAL at 09:58

## 2024-11-23 RX ADMIN — BUDESONIDE 1 MG: 1 INHALANT ORAL at 07:13

## 2024-11-23 RX ADMIN — LORATADINE 10 MG: 10 TABLET ORAL at 09:58

## 2024-11-23 RX ADMIN — LEVALBUTEROL HYDROCHLORIDE 1.25 MG: 1.25 SOLUTION RESPIRATORY (INHALATION) at 07:13

## 2024-11-23 NOTE — PROGRESS NOTES
[facility]  Hospitalist Progress Note    Assessment & Plan   Angely Bryan is a 89 year old female with history of hypertension, hyperlipidemia, atrial fibrillation, CKD 3, CVA, GERD, ICH, breast cancer, low back pain, and COPD who was admitted on 11/15/24 for acute hypoxic respiratory failure likely 2/2 COPD exacerbation.      Acute Hypoxic Respiratory Failure 2/2 COPD Exacerbation  Hx of rib fracture  Occasionally uses 2-3L of home O2 when ill, lately using it over the past 2 weeks or so. Productive cough and orthopnea. Breathing this morning felt much worse than recent days and she has been somewhat nauseous. No fevers, chills, or vomiting. Chronic chest pain from prior rib fracture noted but no new chest pain. No fever at home. She has reportedly been on doxy for the past couple days. In the ED, she received duonebs and solumedrol. WBC 11.4, LA: 0.9, BNP: 385, D dimer 0.60, WNL for age adjustment. CXR without acute infiltrate, no signs of pulm edema. Needing up to 4L and subsequently on bipap in ED. Patient had an RRT on 11/17/24 for chest pain. It was likely MSK as pain was reproducible on palpation. No new fx or findings on CXR overnight, EKG and trop not ischemic appearing. Was able to be weaned down to 3L NC by 11/21/24. On the evening of 11/21/24, patient drops her sats to 76% while sleeping. Unclear of all the events but patient was eventually placed back on HFNC. Patient states that she felt fine during this event. Was able to come back down to 5L NC on 11/22/24 morning.      - She confirmed she is DNR DNI and would not want pre arrest intubation either (son present for discussion in ED)     - Spo2 > 88%               - Currently on 2-3L                    - Wear oxygen mask at night as patient is a mouth breather               - Wean as able      - Continue scheduled and prn nebs  - Empiric abx given exacerbation, levofloxacin continued for a total of 5 doses   - Continue steroid,  solumedrol 60 bid for now x 2 days followed by prednisone 40 daily x 3 days   - Incentive spirometry      - B/L LE DVT US: Negative for DVT     - Pulmonary consulted                - Added budesonide nebs                - If she continues to have hypoxemia then may consider CT PE     Atrial Fibrillation, persistent   PSVT  Mildly tachycardic in ED. Not anticoagulated given history of intraparenchymal and intraventricular bleed while on anticoagulation previously. Early morning on 11/19/24, patient had a 26 beat run of PSVT followed by Afib with RVR. Patient was asymptomatic. Was given 2.5mg IV Metoprolol with no improvement. She was given another 2.5mg IV Metoprolol followed by 10mg IV Dilt with no improvement. She was started on Amiodarone ggt. On 11/19/24 morning, patient continued to have elevated HR in the 130-140s. Cardiology consulted and recommended PO Diltiazem. During the afternoon on 11/19/24, patient had hypotension with increased HR of 140s. RRT was called. Initial plan was so cardiovert given unstability. Patient is DNI/DNR. Patient was not sure with cardioversion. Was given 125mcg IV Digoxin with minimal improvement in HR but some improvement with BP. House team had extensive goals of care conversation with patient and family but patient still uncertain with cardioversion. House team giving another 125mcg of digoxin. Was given PRN IV Metoprolol and patient converted to NSR on 11/20/24 around 2:02am.      - Tele      - Continue Metoprolol 100mg BID with hold parameters      - Cardiology consulted and signed off 11/20/24               - Increase metoprolol to 100 mg daily with hold parameters  - Discontinue diltiazem and digoxin   - Transition to PO amiodarone   - Unable to introduce anticoagulation given hx of brain bleed  - Outpatient follow up will be arranged     Constipation  - Bowel Regimen in place       Goals of Care  Attempted to discuss GOC with patient during rounds on 11/19/24. She stated  that she would need to talk to her family first. She is unclear what she would want moving forward. She confirms DNR/DNI status. House team had in depth GOC discussion with patient and family during RRT on 11/19/24. Spoke to patient and daughter in law on 11/200/24 about the minimal progress with her oxygen but improvement in her HR. They both understand that her lungs are weak. Patient is not opposed to considering hospice but at this time would like more time to think about it, see if she makes any improvements and talk to family.      - Continue to discuss     CKD III   - Cr: 1.15 > 1.19 > 1.24 > 1.13 > 1.24 > 1.22  - Baseline: 1.10-1.25  - Continue to monitor       Hypertension  Hyperlipidemia  BP mildly elevated in ED.  - Continue Metoprolol and Statin     History of mild cognitive impairment  Was noted to have mild memory loss and some gait impairment which had been improving in the past year acting as below she does have history of intraparenchymal and intraventricular bleed while on anticoagulation for A-fib.  - MoCA 23/30 in April 2024  - Reorient as needed, encourage sleep-wake cycles  - OT consult     Hx of intraparenchymal and intraventricular bleed while on anticoagulation for a fib (December 2023)  Noted. Neurosurgery evaluated at that time in hospital. [place] to have memory impairment and gait instability that was improving in past year or so.   - Not on AC, baby aspirin noted     Chronic low back pain  Appears at baseline.  - PTA regimen to continue       Clinically Significant Risk Factors                   # Hypertension: Noted on problem list     # Acute Hypercapnic Respiratory Failure: based on venous blood gas results.  Continue supplemental oxygen and ventilatory support as indicated.             # Financial/Environmental Concerns: none           Diet: Regular Diet Adult  Snacks/Supplements Adult: Ensure Enlive; Between Meals     DVT Prophylaxis: Pneumatic Compression Devices and Ambulate every  shift   Tuttle Catheter: Not present  Lines: None     Cardiac Monitoring: ACTIVE order. Indication: copd exacerbation, hx a fib, IMC  Code Status: No CPR- Do NOT Intubate      Disposition Plan       Expected Discharge Date: 11/24/2024,  3:00 PM    Destination: long-term care facility        Entered: Maribel Godwin MD 11/23/2024, 1:59 PM     Care Team Updated: Nursing and SW updated     Disposition: Could be ready for discharge tomorrow continued stability in oxygen needs       Medically Ready for Discharge: Anticipated Tomorrow        Maribel Godwin MD  Hospitalist Service   [facility]  Securely message with the Vocera Web Console (learn more here)         Medical Decision Making       45 MINUTES SPENT BY ME on the date of service doing chart review, history, exam, documentation & further activities per the note.           Interval History     No acute overnight events.    This morning the patient states that she is doing well. Feeling better this morning.     -Data reviewed today: I reviewed all new labs and imaging results over the last 24 hours.    Physical Exam   Temp: 98.5  F (36.9  C) Temp src: Oral BP: 103/53 Pulse: 72   Resp: 16 SpO2: 95 % O2 Device: Nasal cannula Oxygen Delivery: 2 LPM  Vitals:    11/21/24 0614 11/22/24 0532 11/23/24 0500   Weight: 61.3 kg (135 lb 2.3 oz) 61 kg (134 lb 7.7 oz) 59.1 kg (130 lb 4.7 oz)     Vital Signs with Ranges  Temp:  [97.6  F (36.4  C)-98.5  F (36.9  C)] 98.5  F (36.9  C)  Pulse:  [56-72] 72  Resp:  [15-20] 16  BP: (103-157)/(53-81) 103/53  SpO2:  [92 %-95 %] 95 %  No intake/output data recorded.      Constitutional: Awake, alert, cooperative, no apparent distress.    HEENT: PERRL, Normocephalic, without obvious abnormality, atraumatic, oral pharynx with moist mucus membranes  Pulmonary: Bilateral course lungs sounds with wheezing  Cardiovascular: Irregular rate and rhythm, normal S1 and S2  GI: Normal bowel sounds, soft,  non-distended, non-tender.  Skin/Integumen: Visualized skin appeared clear.  Neuro: CN II-XII grossly intact.  Psych:  Alert and oriented x 3.   Extremities: No lower extremity edema noted          Medications   Current Facility-Administered Medications   Medication Dose Route Frequency Provider Last Rate Last Admin    No lozenges or gum should be given while patient on BIPAP/AVAPS/AVAPS AE   Does not apply Continuous PRN Le Nguyen MD        Patient may continue current oral medications   Does not apply Continuous PRN Le Nguyen MD         Current Facility-Administered Medications   Medication Dose Route Frequency Provider Last Rate Last Admin    amiodarone (PACERONE) tablet 200 mg  200 mg Oral BID Jonathon Garrett PA-C   200 mg at 11/23/24 0958    budesonide (PULMICORT) neb solution 1 mg  1 mg Nebulization BID Сергей Bolton MD   1 mg at 11/23/24 0713    doxazosin (CARDURA) tablet 4 mg  4 mg Oral At Bedtime Soto Fernandes MD   4 mg at 11/22/24 2124    levalbuterol (XOPENEX) neb solution 1.25 mg  1.25 mg Nebulization Q4H WA Bill Gr MD   1.25 mg at 11/23/24 0713    levofloxacin (LEVAQUIN) infusion 750 mg  750 mg Intravenous Q48H Iris Brown [facility]  mL/hr at 11/23/24 1200 Restarted at 11/23/24 1200    Lidocaine (LIDOCARE) 4 % Patch 1 patch  1 patch Transdermal Q24H Maribel Godwin MD   1 patch at 11/23/24 1011    loratadine (CLARITIN) tablet 10 mg  10 mg Oral Daily Soto Fernandes MD   10 mg at 11/23/24 0958    magnesium oxide (MAG-OX) tablet 400 mg  400 mg Oral Daily Soto Fernandes MD   400 mg at 11/23/24 0958    metoprolol succinate ER (TOPROL XL) 24 hr tablet 100 mg  100 mg Oral Daily Jonathon Garrett PA-C   100 mg at 11/23/24 0958    polyethylene glycol (MIRALAX) Packet 17 g  17 g Oral BID Maribel Godwin MD   17 g at 11/23/24 0958    senna-docusate (SENOKOT-S/PERICOLACE) 8.6-50 MG per tablet 1 tablet  1 tablet Oral BID Soto Fernandes,  MD   1 tablet at 11/23/24 0957    Or    senna-docusate (SENOKOT-S/PERICOLACE) 8.6-50 MG per tablet 2 tablet  2 tablet Oral BID Soto Fernandes MD   2 tablet at 11/22/24 2123    sodium chloride (PF) 0.9% PF flush 3 mL  3 mL Intracatheter Q8H Soto Fernandes MD   10 mL at 11/23/24 0958       Data   Recent Labs   Lab 11/21/24  1542 11/20/24  0444 11/19/24  0620 11/18/24  0636 11/17/24  0502   WBC  --  14.1* 15.7* 15.1*  --    HGB  --  12.3 12.4 11.7  --    MCV  --  92 95 94  --    PLT  --  174 176 177  --     134* 135 134* 134*  134*   POTASSIUM 4.5 4.3 4.2 4.9 4.8  4.8   CHLORIDE 104 102 102 105 102  102   CO2 25 27 25 23 24  24   BUN 31.8* 30.0* 34.0* 35.9* 33.1*  33.1*   CR 1.22* 1.24* 1.13* 1.24* 1.19*  1.19*   ANIONGAP 8 5* 8 6* 8  8   CHANDNI 9.2 9.2 9.5 9.4 9.6  9.6   * 88 95 120* 147*  147*   ALBUMIN  --   --   --   --  3.8   PROTTOTAL  --   --   --   --  6.9   BILITOTAL  --   --   --   --  0.2   ALKPHOS  --   --   --   --  72   ALT  --   --   --   --  13   AST  --   --   --   --  26   LIPASE  --   --   --   --  16       No results found for this or any previous visit (from the past 24 hours).

## 2024-11-23 NOTE — PROGRESS NOTES
Care Management Follow Up    Length of Stay (days): 8    Expected Discharge Date: 11/24/2024     Concerns to be Addressed: discharge planning     Patient plan of care discussed at interdisciplinary rounds: Yes    Anticipated Discharge Disposition: Long Term Care     Anticipated Discharge Services: None  Anticipated Discharge DME: None    Patient/family educated on Medicare website which has current facility and service quality ratings: no  Education Provided on the Discharge Plan: No  Patient/Family in Agreement with the Plan: yes    Referrals Placed by CM/SW: Post Acute Facilities  Private pay costs discussed: Not applicable    Discussed  Partnership in Safe Discharge Planning  document with patient/family: No     Handoff Completed: No, handoff not indicated or clinically appropriate    Additional Information:   spoke with physician who stated that patient could potentially be ready for discharge on 11/24 back to LTC.  placed call to [facility] and spoke with Corina regarding patient's return. Corina requested that if patient is ready, that we call the [facility] and update them on the discharge plan at [phone]. Orders can be faxed to [phone]. Corina requested orders, scripts and oxygen orders to be included.  updated physician.  will continue to assist and follow for discharge.    Next Steps:     - Follow up with physician if patient is ready for discharge.  - Arrange transportation.  - Update Karis and fax discharge orders.    DORIS Mcrae, [facility]  Inpatient Care Coordination  Social Work  [phone]  [facility]

## 2024-11-23 NOTE — PLAN OF CARE
Shift Note [id number]:     Patient is alert and oriented x4.   Mobility: Ax1 gait belt and walker  Vitals/Tele: VSS- maintains oxygenation on 3-4L above 88%. Tele NSR  Aggression Stop Light: green    Shift Summary: Writer, family and patient reviewed goals for weekend. 1. Walking 2x in hallway with staff a day. 2. Up in chair for all meals or edge of bed. 3. No purewick during the day and to go to the bathroom.    Discharge Plan: TBD    See Flow sheets for assessment

## 2024-11-23 NOTE — PLAN OF CARE
"Patient Name: Zulay  MRN: [id number]  Date of Admission: 11/15/2024  Reason for Admission: COPD exacerbation  Level of Care: Medical-surgical    Vitals:   BP Readings from Last 1 Encounters:   11/23/24 (!) 156/73     Pulse Readings from Last 1 Encounters:   11/23/24 69     Wt Readings from Last 1 Encounters:   11/22/24 61 kg (134 lb 7.7 oz)     Ht Readings from Last 1 Encounters:   11/15/24 1.626 m (5' 4\")     Estimated body mass index is 23.08 kg/m  as calculated from the following:    Height as of this encounter: 1.626 m (5' 4\").    Weight as of this encounter: 61 kg (134 lb 7.7 oz).  Temp Readings from Last 1 Encounters:   11/23/24 97.8  F (36.6  C) (Oral)       Pain: Pain goal 0 Pain Rating 0 Effective pain medication/regimen n/a denies pain    CV Surgery Patient: No    Assessment    Resp: Dyspnea w/exertion and slight hypertensive, otherwise VSS on 2L NC, wearing oxymask overnight, spot check w/pulse oximetry, oxygen sats >88%, see flowsheet.   Telemetry: SR/SB  Neuro: A&Ox4  GI/: Regular diet, tolerating well. Takes pills whole w/applesauce. Voiding adequately, used purewick overnight, -BM, +flatus  Skin/Wounds: Scattered bruising  Lines/Drains: R PIV SL  Activity: Ax1 w/GBW  Sleep: Slept comfortably between cares  Abnormal Labs:     Aggression Stop Light: Green          Patient Care Plan: Wean oxygen as able, up in chair for meals, ambulate x2 daily.     "

## 2024-11-24 VITALS
SYSTOLIC BLOOD PRESSURE: 91 MMHG | BODY MASS INDEX: 21.45 KG/M2 | DIASTOLIC BLOOD PRESSURE: 44 MMHG | WEIGHT: 125.66 LBS | RESPIRATION RATE: 16 BRPM | HEART RATE: 51 BPM | TEMPERATURE: 97.9 F | HEIGHT: 64 IN | OXYGEN SATURATION: 94 %

## 2024-11-24 PROCEDURE — 999N000157 HC STATISTIC RCP TIME EA 10 MIN

## 2024-11-24 PROCEDURE — 94640 AIRWAY INHALATION TREATMENT: CPT | Mod: 76

## 2024-11-24 PROCEDURE — 250N000013 HC RX MED GY IP 250 OP 250 PS 637: Performed by: HOSPITALIST

## 2024-11-24 PROCEDURE — 250N000013 HC RX MED GY IP 250 OP 250 PS 637: Performed by: PHYSICIAN ASSISTANT

## 2024-11-24 PROCEDURE — 250N000009 HC RX 250: Performed by: STUDENT IN AN ORGANIZED HEALTH CARE EDUCATION/TRAINING PROGRAM

## 2024-11-24 PROCEDURE — 94640 AIRWAY INHALATION TREATMENT: CPT

## 2024-11-24 PROCEDURE — 250N000009 HC RX 250: Performed by: INTERNAL MEDICINE

## 2024-11-24 PROCEDURE — 99239 HOSP IP/OBS DSCHRG MGMT >30: CPT | Performed by: STUDENT IN AN ORGANIZED HEALTH CARE EDUCATION/TRAINING PROGRAM

## 2024-11-24 RX ORDER — BUDESONIDE 1 MG/2ML
1 INHALANT ORAL 2 TIMES DAILY
DISCHARGE
Start: 2024-11-24

## 2024-11-24 RX ORDER — AMLODIPINE BESYLATE 10 MG/1
10 TABLET ORAL DAILY
DISCHARGE
Start: 2024-11-24

## 2024-11-24 RX ORDER — GUAIFENESIN 200 MG/10ML
200 LIQUID ORAL EVERY 4 HOURS PRN
DISCHARGE
Start: 2024-11-24

## 2024-11-24 RX ORDER — METOPROLOL SUCCINATE 100 MG/1
100 TABLET, EXTENDED RELEASE ORAL DAILY
DISCHARGE
Start: 2024-11-24

## 2024-11-24 RX ORDER — LEVOFLOXACIN 750 MG/1
750 TABLET, FILM COATED ORAL EVERY OTHER DAY
DISCHARGE
Start: 2024-11-25 | End: 2024-11-27

## 2024-11-24 RX ORDER — AMIODARONE HYDROCHLORIDE 200 MG/1
200 TABLET ORAL 2 TIMES DAILY
DISCHARGE
Start: 2024-11-24 | End: 2024-12-16

## 2024-11-24 RX ADMIN — LORATADINE 10 MG: 10 TABLET ORAL at 09:55

## 2024-11-24 RX ADMIN — Medication 400 MG: at 09:55

## 2024-11-24 RX ADMIN — LEVALBUTEROL HYDROCHLORIDE 1.25 MG: 1.25 SOLUTION RESPIRATORY (INHALATION) at 11:27

## 2024-11-24 RX ADMIN — BUDESONIDE 1 MG: 1 INHALANT ORAL at 07:51

## 2024-11-24 RX ADMIN — LEVALBUTEROL HYDROCHLORIDE 1.25 MG: 1.25 SOLUTION RESPIRATORY (INHALATION) at 07:51

## 2024-11-24 RX ADMIN — ACETAMINOPHEN 650 MG: 325 TABLET, FILM COATED ORAL at 10:02

## 2024-11-24 RX ADMIN — METOPROLOL SUCCINATE 100 MG: 100 TABLET, EXTENDED RELEASE ORAL at 09:55

## 2024-11-24 RX ADMIN — AMIODARONE HYDROCHLORIDE 200 MG: 200 TABLET ORAL at 09:55

## 2024-11-24 RX ADMIN — ACETAMINOPHEN 650 MG: 325 TABLET, FILM COATED ORAL at 14:38

## 2024-11-24 ASSESSMENT — ACTIVITIES OF DAILY LIVING (ADL)
ADLS_ACUITY_SCORE: 0

## 2024-11-24 NOTE — DISCHARGE SUMMARY
[facility]  Hospitalist Discharge Summary     Admit Date:  11/15/2024  Discharge Date:     11/24/2024  Discharging Provider: Maribel Godwin MD    PRIMARY CARE PROVIDER:    Rajiv Joe     DISCHARGE DIAGNOSES:     Acute Hypoxic Respiratory Failure 2/2 COPD Exacerbation  Hx of rib fracture  Atrial Fibrillation, persistent   PSVT  Constipation   Goals of Care   CKD III   Hypertension  Hyperlipidemia  History of mild cognitive impairment   Hx of intraparenchymal and intraventricular bleed while on anticoagulation for a fib (December 2023)   Chronic low back pain     BRIEF HISTORY OF PRESENT ILLNESS/HOSPITAL COURSE:   Angely Bryan is a 89 year old female with history of hypertension, hyperlipidemia, atrial fibrillation, CKD 3, CVA, GERD, ICH, breast cancer, low back pain, and COPD who was admitted on 11/15/24 for acute hypoxic respiratory failure likely 2/2 COPD exacerbation.      Acute Hypoxic Respiratory Failure 2/2 COPD Exacerbation  Hx of rib fracture  Occasionally uses 2-3L of home O2 when ill, lately using it over the past 2 weeks or so. Productive cough and orthopnea. Breathing this morning felt much worse than recent days and she has been somewhat nauseous. No fevers, chills, or vomiting. Chronic chest pain from prior rib fracture noted but no new chest pain. No fever at home. She has reportedly been on doxy for the past couple days. In the ED, she received duonebs and solumedrol. WBC 11.4, LA: 0.9, BNP: 385, D dimer 0.60, WNL for age adjustment. CXR without acute infiltrate, no signs of pulm edema. Needing up to 4L and subsequently on bipap in ED. Patient had an RRT on 11/17/24 for chest pain. It was likely MSK as pain was reproducible on palpation. No new fx or findings on CXR overnight, EKG and trop not ischemic appearing. Was able to be weaned down to 3L NC by 11/21/24. On the evening of 11/21/24, patient drops her sats to 76% while sleeping. Unclear of all the events  but patient was eventually placed back on HFNC. Patient states that she felt fine during this event. Was able to come back down to 5L NC on 11/22/24 morning. Was able to be weaned down to 2L NC.      - She confirmed she is DNR DNI and would not want pre arrest intubation either (son present for discussion in ED)     - Spo2 > 88%               - Currently on 2L                   - Wear oxygen mask at night as patient is a mouth breather               - Wean as able      - Continue scheduled and prn nebs  - Empiric abx given exacerbation, levofloxacin continued for a total of 5 doses   - Finished 5 day course of steroids   - Incentive spirometry      - B/L LE DVT US: Negative for DVT     - Pulmonary consulted                - Added budesonide nebs      Atrial Fibrillation, persistent   PSVT  Mildly tachycardic in ED. Not anticoagulated given history of intraparenchymal and intraventricular bleed while on anticoagulation previously. Early morning on 11/19/24, patient had a 26 beat run of PSVT followed by Afib with RVR. Patient was asymptomatic. Was given 2.5mg IV Metoprolol with no improvement. She was given another 2.5mg IV Metoprolol followed by 10mg IV Dilt with no improvement. She was started on Amiodarone ggt. On 11/19/24 morning, patient continued to have elevated HR in the 130-140s. Cardiology consulted and recommended PO Diltiazem. During the afternoon on 11/19/24, patient had hypotension with increased HR of 140s. RRT was called. Initial plan was so cardiovert given unstability. Patient is DNI/DNR. Patient was not sure with cardioversion. Was given 125mcg IV Digoxin with minimal improvement in HR but some improvement with BP. House team had extensive goals of care conversation with patient and family but patient still uncertain with cardioversion. House team giving another 125mcg of digoxin. Was given PRN IV Metoprolol and patient converted to NSR on 11/20/24 around 2:02am.      - Tele      - Continue  Metoprolol 100mg BID with hold parameters      - Cardiology consulted and signed off 11/20/24              - Increase metoprolol to 100 mg daily with hold parameters  - Discontinue diltiazem and digoxin   - Transition to PO amiodarone   - Unable to introduce anticoagulation given hx of brain bleed  - Outpatient follow up will be arranged     Constipation  - Bowel Regimen in place       Goals of Care  Attempted to discuss GOC with patient during rounds on 11/19/24. She stated that she would need to talk to her family first. She is unclear what she would want moving forward. She confirms DNR/DNI status. House team had in depth GOC discussion with patient and family during RRT on 11/19/24. Spoke to patient and daughter in law on 11/200/24 about the minimal progress with her oxygen but improvement in her HR. They both understand that her lungs are weak. Patient is not opposed to considering hospice but at this time would like more time to think about it, see if she makes any improvements and talk to family. Given improvement the patient states that she appreciated having goals of care conversation. She understands that she can always change her mind at anytime. Family in agreement.      - Continue to discuss     CKD III   - Cr: 1.15 > 1.19 > 1.24 > 1.13 > 1.24 > 1.22  - Baseline: 1.10-1.25  - Continue to monitor       Hypertension  Hyperlipidemia  BP mildly elevated in ED.  - Resume home Amlodipine at discharge   - Continue Metoprolol and Statin     History of mild cognitive impairment  Was noted to have mild memory loss and some gait impairment which had been improving in the past year acting as below she does have history of intraparenchymal and intraventricular bleed while on anticoagulation for A-fib.  - MoCA 23/30 in April 2024  - Reorient as needed, encourage sleep-wake cycles  - OT consult     Hx of intraparenchymal and intraventricular bleed while on anticoagulation for a fib (December 2023)  Noted. Neurosurgery  evaluated at that time in hospital. [place] to have memory impairment and gait instability that was improving in past year or so.   - Not on AC, baby aspirin noted     Chronic low back pain  Appears at baseline.  - PTA regimen to continue       Clinically Significant Risk Factors                   # Hypertension: Noted on problem list     # Acute Hypercapnic Respiratory Failure: based on venous blood gas results.  Continue supplemental oxygen and ventilatory support as indicated.             # Financial/Environmental Concerns: none              TOTAL DISCHARGE TIME:  IMaribel MD, personally saw the patient today and spent greater than 30 minutes discharging this patient.    Maribel Godwin MD  [facility]  Hospitalist Service   Securely message with the Vocera Web Console (learn more here)  Text page via [employer] Paging/Directory        Significant Results and Procedures   N/a    Pending Results   These results will be followed up by n/a  Unresulted Labs Ordered in the Past 30 Days of this Admission       No orders found from 10/16/2024 to 11/16/2024.            Code Status   DNR / DNI       Primary Care Physician   Rajiv Joe    Physical Exam   Temp: 98.4  F (36.9  C) Temp src: Oral BP: 136/54 Pulse: 60   Resp: 16 SpO2: 93 % O2 Device: Oxymask Oxygen Delivery: 2 LPM  Vitals:    11/22/24 0532 11/23/24 0500 11/24/24 0631   Weight: 61 kg (134 lb 7.7 oz) 59.1 kg (130 lb 4.7 oz) 57 kg (125 lb 10.6 oz)     Vital Signs with Ranges  Temp:  [97.6  F (36.4  C)-98.5  F (36.9  C)] 98.4  F (36.9  C)  Pulse:  [56-72] 60  Resp:  [16-18] 16  BP: (103-148)/(53-69) 136/54  SpO2:  [90 %-95 %] 93 %  I/O last 3 completed shifts:  In: 1290 [P.O.:1120; I.V.:170]  Out: 430 [Urine:430]    Constitutional: Awake, alert, cooperative, no apparent distress.    HEENT: PERRL, Normocephalic, without obvious abnormality, atraumatic, oral pharynx with moist mucus membranes  Pulmonary: Bilateral course  lungs sounds with wheezing  Cardiovascular: Irregular rate and rhythm, normal S1 and S2  GI: Normal bowel sounds, soft, non-distended, non-tender.  Skin/Integumen: Visualized skin appeared clear.  Neuro: CN II-XII grossly intact.  Psych:  Alert and oriented x 3.   Extremities: No lower extremity edema noted    Discharge Disposition   Discharged to long-term care facility  Condition at discharge: Stable    Consultations This Hospital Stay   OCCUPATIONAL THERAPY ADULT IP CONSULT  PHYSICAL THERAPY ADULT IP CONSULT  CARE MANAGEMENT / SOCIAL WORK IP CONSULT  PULMONARY IP CONSULT  CARDIOLOGY IP CONSULT  PHYSICAL THERAPY ADULT IP CONSULT  OCCUPATIONAL THERAPY ADULT IP CONSULT      Discharge Orders      General info for SNF    Length of Stay Estimate: Long Term Care  Condition at Discharge: Stable  Level of care:board and care  Rehabilitation Potential: Fair  Admission H&P remains valid and up-to-date: Yes  Recent Chemotherapy: N/A  Use Nursing Home Standing Orders: Yes     Mantoux instructions    Give two-step Mantoux (PPD) Per Facility Policy Yes     Follow Up and recommended labs and tests    Follow up with Nursing home physician in 1 week.  No follow up labs or test are needed.     Reason for your hospital stay    You were admitted to the hospital for acute hypoxic respiratory failure due to a COPD exacerbation. You were treated with antibiotics, steroids and nebulizers.     Activity - Up with nursing assistance     Additional Discharge Instructions    1. Please use Purwik at night if available   2. Please order [employer] supplements if able   3. After PT assessment help with daily walks   4. Try and wean oxygen as able during the day     Activity - Ambulate in hallway    Every shift     Physical Therapy Adult Consult    Evaluate and treat as clinically indicated.    Reason:  Generalized Weakness     Occupational Therapy Adult Consult    Evaluate and treat as clinically indicated.    Reason:  Generalized Weakness      Oxygen (SNF/TCU) Discharge    Wear oxygen mask as night and nasal cannula during the day     Diet    Follow this diet upon discharge: Regular Diet Adult  Snacks/Supplements Adult: [employer]; Between Meals     Discharge Medications   Current Discharge Medication List        START taking these medications    Details   amiodarone (PACERONE) 200 MG tablet Take 1 tablet (200 mg) by mouth 2 times daily.    Associated Diagnoses: Chronic obstructive pulmonary disease with acute exacerbation (H); Paroxysmal atrial fibrillation (H)      budesonide (PULMICORT) 1 MG/2ML neb solution Take 2 mLs (1 mg) by nebulization 2 times daily.    Associated Diagnoses: Chronic obstructive pulmonary disease with acute exacerbation (H)      guaiFENesin (ROBITUSSIN) 20 mg/mL liquid Take 10 mLs (200 mg) by mouth every 4 hours as needed for other (secretion expectorant).    Associated Diagnoses: Chronic obstructive pulmonary disease with acute exacerbation (H)      levofloxacin (LEVAQUIN) 750 MG tablet Take 1 tablet (750 mg) by mouth every other day for 2 days.    Associated Diagnoses: Chronic obstructive pulmonary disease with acute exacerbation (H)           CONTINUE these medications which have CHANGED    Details   amLODIPine (NORVASC) 10 MG tablet Take 1 tablet (10 mg) by mouth daily. Hold if SBP <100    Associated Diagnoses: Intraventricular hemorrhage (H)      metoprolol succinate ER (TOPROL XL) 100 MG 24 hr tablet Take 1 tablet (100 mg) by mouth daily.    Associated Diagnoses: Paroxysmal atrial fibrillation (H)           CONTINUE these medications which have NOT CHANGED    Details   !! acetaminophen (TYLENOL) 500 MG tablet Take 1,000 mg by mouth 2 times daily. Max 4g/24H      !! acetaminophen (TYLENOL) 500 MG tablet Take 1,000 mg by mouth 2 times daily as needed for mild pain. Max 4g/24H      doxazosin (CARDURA) 4 MG tablet Take 4 mg by mouth at bedtime. Hold if SBP <100      !! ipratropium - albuterol 0.5 mg/2.5 mg/3 mL (DUONEB)  0.5-2.5 (3) MG/3ML neb solution Take 1 vial by nebulization 3 times daily.      !! ipratropium - albuterol 0.5 mg/2.5 mg/3 mL (DUONEB) 0.5-2.5 (3) MG/3ML neb solution Take 1 vial by nebulization every 8 hours as needed for shortness of breath or wheezing.      Lidocaine (LIDOCARE) 4 % Patch Place 1 patch onto the skin daily as needed for moderate pain To prevent lidocaine toxicity, patient should be patch free for 12 hrs daily.  Qty: 30 patch, Refills: 0    Associated Diagnoses: Intraventricular hemorrhage (H)      loratadine (CLARITIN) 10 MG tablet Take 10 mg by mouth daily.      magnesium oxide 400 MG CAPS Take 1 capsule by mouth daily      polyethylene glycol (MIRALAX) 17 GM/Dose powder Take 1 Capful by mouth 2 times daily.      senna-docusate (SENOKOT-S/PERICOLACE) 8.6-50 MG tablet Take 2 tablets by mouth 2 times daily. For constipation hold for diarrhea      Vitamin D3 (CHOLECALCIFEROL) 25 mcg (1000 units) tablet Take 1 tablet by mouth daily       !! - Potential duplicate medications found. Please discuss with provider.        Allergies   Allergies   Allergen Reactions    Losartan Swelling     Tongue swelling    Penicillins Rash     Tongue swelling      Data   Most Recent 3 CBC's:  Recent Labs   Lab Test 11/20/24  0444 11/19/24  0620 11/18/24  0636   WBC 14.1* 15.7* 15.1*   HGB 12.3 12.4 11.7   MCV 92 95 94    176 177      Most Recent 3 BMP's:  Recent Labs   Lab Test 11/21/24  1542 11/20/24  0444 11/19/24  0620    134* 135   POTASSIUM 4.5 4.3 4.2   CHLORIDE 104 102 102   CO2 25 27 25   BUN 31.8* 30.0* 34.0*   CR 1.22* 1.24* 1.13*   ANIONGAP 8 5* 8   CHANDNI 9.2 9.2 9.5   * 88 95     Most Recent 2 LFT's:  Recent Labs   Lab Test 11/17/24  0502 11/15/24  0730   AST 26 18   ALT 13 10   ALKPHOS 72 82   BILITOTAL 0.2 0.2     Most Recent INR's and Anticoagulation Dosing History:  Anticoagulation Dose History           No data to display              Most Recent 3 Troponin's:No lab results found.  Most  Recent Cholesterol Panel:No lab results found.  Most Recent 6 Bacteria Isolates From Any Culture (See EPIC Reports for Culture Details):No lab results found.  Most Recent TSH, T4 and A1c Labs:No lab results found.  Results for orders placed or performed during the hospital encounter of 11/15/24   Chest XR,  PA & LAT    Narrative    CHEST TWO VIEWS 11/15/2024 7:58 AM     HISTORY: COPD exac.    COMPARISON: December 23, 2023       Impression    IMPRESSION: There are no acute infiltrates. The cardiac silhouette is  not enlarged. Pulmonary vasculature is unremarkable.    ASHLEIGH KRISHNAMURTHY MD         SYSTEM ID:  [id number]   XR Chest Port 1 View    Narrative    EXAM: XR CHEST PORT 1 VIEW  LOCATION: [facility]  DATE: 11/17/2024    INDICATION: RRT: chest pain 10 10  COMPARISON: 11/15/2024      Impression    IMPRESSION: Shallow inspiration. Stable cardiomediastinal silhouette. Mild atelectasis or infiltrate left lung base. No vascular congestion or significant effusion. Atherosclerotic aorta.   US Lower Extremity Venous Duplex Bilateral    Narrative    VENOUS ULTRASOUND BOTH LEGS  11/20/2024 3:18 PM     HISTORY: Shortness of breath, hypoxia.    COMPARISON: None.    FINDINGS:  Examination of the deep veins with graded compression and  color flow Doppler with spectral wave form analysis was performed.      Impression    IMPRESSION: No evidence of deep venous thrombosis.    CRISTIAN MAE MD         SYSTEM ID:  [id number]

## 2024-11-24 NOTE — PLAN OF CARE
"Patient Name: Zulay  MRN: [id number]  Date of Admission: 11/15/2024  Reason for Admission: COPD exacerbation  Level of Care: Medical-surgical    Vitals:   BP Readings from Last 1 Encounters:   11/24/24 125/58     Pulse Readings from Last 1 Encounters:   11/24/24 60     Wt Readings from Last 1 Encounters:   11/23/24 59.1 kg (130 lb 4.7 oz)     Ht Readings from Last 1 Encounters:   11/15/24 1.626 m (5' 4\")     Estimated body mass index is 22.36 kg/m  as calculated from the following:    Height as of this encounter: 1.626 m (5' 4\").    Weight as of this encounter: 59.1 kg (130 lb 4.7 oz).  Temp Readings from Last 1 Encounters:   11/24/24 97.6  F (36.4  C) (Oral)       Pain: Pain goal 0 Pain Rating 6/10 before tylenol Effective pain medication/regimen PRN tylenol x1    CV Surgery Patient: No    Assessment    Resp: CAMPBELL, otherwise VSS on 2L NC, used oxymask overnight, pulse oximetry spot check, oxygen sats >88%.   Telemetry: SR/SB  Neuro: A&Ox4, forgetful  GI/: Regular diet, tolerating well. Takes pills whole w/applesauce. Incontinent at times, external catheter in place overnight, adequate UO, -BM, +flatus  Skin/Wounds: Scattered bruising  Lines/Drains: PIV SL  Activity: Ax1 w/GBW  Sleep: Slept intermittently between cares  Abnormal Labs:     Aggression Stop Light: Green          Patient Care Plan: Possible discharge back to long-term care facility today.     "

## 2024-11-24 NOTE — PLAN OF CARE
"Goal Outcome Evaluation:       Patient Name: Angely Bryan  MRN: [id number]  Date of Admission: 11/15/2024  Reason for Admission:     Acute exacerbation of chronic obstructive pulmonary disease (COPD) (H)  Level of Care: Acute    Vitals:   BP Readings from Last 1 Encounters:   11/23/24 118/58     Pulse Readings from Last 1 Encounters:   11/23/24 67     Wt Readings from Last 1 Encounters:   11/23/24 59.1 kg (130 lb 4.7 oz)     Ht Readings from Last 1 Encounters:   11/15/24 1.626 m (5' 4\")     Estimated body mass index is 22.36 kg/m  as calculated from the following:    Height as of this encounter: 1.626 m (5' 4\").    Weight as of this encounter: 59.1 kg (130 lb 4.7 oz).  Temp Readings from Last 1 Encounters:   11/23/24 98.3  F (36.8  C) (Oral)       Pain: Pain goal 0 Pain Rating 0 Effective pain medication/regimen      CV Surgery Patient: No    Assessment    Resp: 2 L O2 N/C  Telemetry: SR  Neuro: A&O x4.   GI/: BS normoactive, one BM during shift. Adequate urine output in Purewick and pivot to commode  Skin/Wounds: Scattered bruising  Lines/Drains: Rt PIV SL  Activity: Assist of 1 w/GBW  Sleep: Pt was tired today but more alert after a nap.  Abnormal Labs: N/A    Aggression Stop Light: Green          Patient Care Plan: Plan to discharge tomorrow.                 "

## 2024-11-24 NOTE — PROGRESS NOTES
Care Management Follow Up    Length of Stay (days): 9    Expected Discharge Date: 11/24/2024     Concerns to be Addressed: discharge planning     Patient plan of care discussed at interdisciplinary rounds: Yes    Anticipated Discharge Disposition: Long Term Care              Anticipated Discharge Services: None  Anticipated Discharge DME: None    Patient/family educated on Medicare website which has current facility and service quality ratings: no  Education Provided on the Discharge Plan: No  Patient/Family in Agreement with the Plan: yes    Referrals Placed by CM/SW: Post Acute Facilities  Private pay costs discussed: transportation costs    Discussed  Partnership in Safe Discharge Planning  document with patient/family: No     Handoff Completed: No, handoff not indicated or clinically appropriate    Additional Information:  Patient is medically ready to discharge back to [facility] today. JENNIFER setup a wheelchair ride for between [id number]. JENNIFER attempted to call [facility] but phone continuously rang. Will try again.     Addendum:  JENNIFER did speak with [facility] at [facility] and confirmed patient's discharge time. Orders signed and have been faxed to [phone] per [facility] request. JENNIFER also spoke with patient and daughter Chiquita regarding discharge plan. They are in agreement. And aware of cost associated with transport.     Next Steps: assist with discharge planning    LEIA Babb

## 2024-11-24 NOTE — PLAN OF CARE
Orientations: AXO forgetful at times  Vitals/Pain: VSS on 2Lnc. & oxymask while sleeping   Tele: NSR/ SB  Lines/Drains: IVs removed   Skin/Wounds: Scattered bruising & dry skin noted.   GI/: + void, +BM, Purewick use at night time   Ambulation/Assist: Ax1 gb & walker, up to chair this shift   Sleep Quality: good  Plan: Discharge instructions given & daughter at bedside. Pt discharge back to long term facility [facility].

## 2024-11-25 ENCOUNTER — PATIENT OUTREACH (OUTPATIENT)
Dept: CARE COORDINATION | Facility: CLINIC | Age: 89
End: 2024-11-25
Payer: MEDICARE

## 2024-11-25 NOTE — PLAN OF CARE
Physical Therapy Discharge Summary    Reason for therapy discharge:    Discharged to long term care facility.    Progress towards therapy goal(s). See goals on Care Plan in [facility] electronic health record for goal details.  Goals partially met.  Barriers to achieving goals:   discharge from facility.    Therapy recommendation(s):    Continued therapy is recommended.  Rationale/Recommendations: Pt is currently below baseline level of independence, currently needing A x1 w/ 4WW for mobility At baseline, pt is independent with ADLs, using walker for mobility in home and w/c for transportation typically outside of apartment. In order to return home, pt requires some assistance with ADLs. Pt would require skilled rehab services at [facility] in order to return to previous level of independence. However, if pt is able to reach baseline mobility, can consider home with assist.  PT Brief overview of current status: A x1 w/ 4WW w/ chair follow          Recommendation above provided by last treating therapist.

## 2024-11-25 NOTE — SIGNIFICANT EVENT
Significant Event Note    Time of event: 10:03 PM November 24, 2024    Description of event:  Called by RN on the floor who stated that jarrett did not receive medications for after discharge when the patient returned to the facility.     I reviewed the discharge medications, and it appears everything was appropriately reconciled, although it is not clear why the patient meds are listed as not reconciled?    Plan:  I asked the RN to print and fax the IATF intraagency transfer form to the facility.     AM charge nurse will follow up with the [facility] tomorrow to see if that fixes the problem    Discussed with: bedside nurse    Guy Hi, DO

## 2024-11-25 NOTE — PLAN OF CARE
Occupational Therapy Discharge Summary    Reason for therapy discharge:    Discharged to long term care facility.    Progress towards therapy goal(s). See goals on Care Plan in [facility] electronic health record for goal details.  Goals partially met.  Barriers to achieving goals:   discharge from facility.    Therapy recommendation(s):    Continued therapy is recommended.  Rationale/Recommendations:  Pt functioning below baseline, limited by decreased activity angelo, strength, O2 needs. Recommend TCU to increase independence w/ BADLs, strength.    **Pt not seen by discharging therapist on this date, note written based on previous treating therapist's notes and recommendations

## 2024-11-25 NOTE — PROGRESS NOTES
[facility]    Background: Transitional Care Management program identified per system criteria and reviewed by Connected Care Resource Center team for possible outreach.    Assessment: Upon chart review, CCRC Team member will not proceed with patient outreach related to this episode of Transitional Care Management program due to reason below:    Non-[facility] TCU: CCRC team member noted patient discharged to TCU/ARU/LTACH. Patient is not established with a [facility] currently supported by Primary Care-Care Coordination therefore handoff to Primary Care-Care Coordination is not appropriate at this time.    Plan: Transitional Care Management episode addressed appropriately per reason noted above.      LUIS MIGUEL Hazel  [facility], [facility]    *Connected Care Resource Team does NOT follow patient ongoing. Referrals are identified based on internal discharge reports and the outreach is to ensure patient has an understanding of their discharge instructions.

## 2024-11-25 NOTE — PROGRESS NOTES
After visit summary and IATF with physician orders faxed to [facility] at [phone].

## 2024-11-27 ENCOUNTER — DOCUMENTATION ONLY (OUTPATIENT)
Dept: OTHER | Facility: CLINIC | Age: 89
End: 2024-11-27
Payer: MEDICARE

## 2024-12-16 ENCOUNTER — OFFICE VISIT (OUTPATIENT)
Dept: CARDIOLOGY | Facility: CLINIC | Age: 89
End: 2024-12-16
Payer: COMMERCIAL

## 2024-12-16 VITALS
WEIGHT: 138.8 LBS | BODY MASS INDEX: 24.59 KG/M2 | OXYGEN SATURATION: 95 % | HEIGHT: 63 IN | HEART RATE: 50 BPM | DIASTOLIC BLOOD PRESSURE: 63 MMHG | SYSTOLIC BLOOD PRESSURE: 160 MMHG

## 2024-12-16 DIAGNOSIS — J44.1 CHRONIC OBSTRUCTIVE PULMONARY DISEASE WITH ACUTE EXACERBATION (H): ICD-10-CM

## 2024-12-16 DIAGNOSIS — Z79.899 ENCOUNTER FOR MONITORING AMIODARONE THERAPY: ICD-10-CM

## 2024-12-16 DIAGNOSIS — K21.9 GASTROESOPHAGEAL REFLUX DISEASE WITHOUT ESOPHAGITIS: ICD-10-CM

## 2024-12-16 DIAGNOSIS — Z51.81 ENCOUNTER FOR MONITORING AMIODARONE THERAPY: ICD-10-CM

## 2024-12-16 DIAGNOSIS — I48.0 PAROXYSMAL ATRIAL FIBRILLATION (H): Primary | ICD-10-CM

## 2024-12-16 PROCEDURE — 99204 OFFICE O/P NEW MOD 45 MIN: CPT | Performed by: NURSE PRACTITIONER

## 2024-12-16 RX ORDER — PANTOPRAZOLE SODIUM 20 MG/1
20 TABLET, DELAYED RELEASE ORAL DAILY
Qty: 90 TABLET | Refills: 1 | Status: SHIPPED | OUTPATIENT
Start: 2024-12-16

## 2024-12-16 RX ORDER — AMIODARONE HYDROCHLORIDE 200 MG/1
200 TABLET ORAL DAILY
Qty: 90 TABLET | Refills: 1 | Status: SHIPPED | OUTPATIENT
Start: 2024-12-16

## 2024-12-16 NOTE — PATIENT INSTRUCTIONS
It was nice to meet you.    Thank you for your visit with the [facility] today.    Medication changes and/or recommendations discussed today:   Reduce amiodarone to 200mg once daily    Try to have a snack in the afternoon like a banana or toast     Labs:  - 3 months before next visit     Follow up plan:   - See Sun back in 3 months        If you have questions or concerns in the meantime please call my nurse team at [phone] or send a [employer] message for non urgent requests.       Scheduling phone number: [phone]    ________________________________    HEIDY Sauceda, CNP    [facility]

## 2024-12-16 NOTE — PROGRESS NOTES
Cardiology Clinic Follow up     Angely Bryan MRN# [id number]   YOB: 1931 Age: 89 year old     Primary cardiologist: Dr. Brush (hospital consult)    Reason for visit: Post hospital follow up    History of presenting illness:    Angely Bryan is a pleasant 89 year old female with past medical history significant for:    Paroxysmal atrial fibrillation  COPD  History of of intraparenchymal and intraventricular bleed while on OAC for A fib in 12/2023   Carotid stenosis s/p left CEA 2015  TIA 2015  CAC on CT  HTN  HLD  CKD 3  Mild cognitive impairment   Chronic back pain, spinal stenosis   History of falls  DNR/DNI    Recent hospitalization 11/15-11/24/24 with acute hypoxic respiratory failure felt secondary to COPD exacerbation. Cardiology was consulted for AF with RVR, converted to sinus with rate control medications and was started on IV amio. Changed to metoprolol and amiodarone added for rhythm control. No anticoagulation and not felt to be a LAAO candidate with advanced age and frailty.     Today, Angely presents for follow up with her son Maikel. She resides at long term care at [facility]. She has had heart burn at times that has been intermittent. Tums has helped.  Denies any palpitations, chest pain, increased dyspnea. Discussed hospitalization, medications, cardiac testing options.            Assessment and Plan:     ASSESSMENT:    Paroxysmal atrial fibrillation  -Continue rhythm control with amiodarone to help maintain sinus given she is unable to take anticoagulation to reduce stroke risks.   -Reduce amiodarone to 200mg daily; pulse 56 today; encouraged food intake with medications to lessen any GI upset risk  -Continue metoprolol XL 100mg daily   -Monitor heart rates, mildly bradycardic today still on amio loading - reducing as above  -Discussed consideration of a baseline Echocardiogram - patient and son favor no cardiac testing, conservative approach to care  -Recommend labs prior to next  "visit : LFTs, TFT's  -No PFTs given advanced age  -No anticoagulation given bleeding history on previous anticoagulation and not felt to be a LAAO candidate with advanced age and frailty.     HTN  -Mildly elevated today  -Monitor at care facility  -Continues on metoprolol 100mg, amlodipine 10mg and doxazosin 4mg  -Allergy to ARB     3.  GERD  -Trial PPI with pantoprazole 20mg daily       PLAN:     Reduce amiodarone to 200mg daily  Okay to continue pantoprazole for heartburn; follow up with nursing home provider if persisting   CMP and thyroid function labs prior to next visit   Follow up in 3 months          Kerri Flores, WALTER, APRN, CNP  [facility]     Orders this Visit:  Orders Placed This Encounter   Procedures    Comprehensive metabolic panel    TSH with free T4 reflex    Follow-Up with Cardiology VIRGILIO     Orders Placed This Encounter   Medications    amiodarone (PACERONE) 200 MG tablet     Sig: Take 1 tablet (200 mg) by mouth daily.     Dispense:  90 tablet     Refill:  1    pantoprazole (PROTONIX) 20 MG EC tablet     Sig: Take 1 tablet (20 mg) by mouth daily.     Dispense:  90 tablet     Refill:  1     Medications Discontinued During This Encounter   Medication Reason    amiodarone (PACERONE) 200 MG tablet Reorder (No AVS)       Today's clinic visit entailed:  Review of external notes as documented elsewhere in note  Ordering of each unique test  Prescription drug management  The level of medical decision making during this visit was of moderate complexity.             Physical Exam:   Vitals: BP (!) 160/63 (BP Location: Right arm, Patient Position: Sitting)   Pulse 50   Ht 1.6 m (5' 3\")   Wt 63 kg (138 lb 12.8 oz)   SpO2 95%   BMI 24.59 kg/m      Body mass index is 24.59 kg/m .    Vitals:    12/16/24 1603   Weight: 63 kg (138 lb 12.8 oz)       General :   Alert and oriented older female in wheelchair in no acute distress.    Respiratory:   Respirations unlabored. Clear bilaterally " with no rales, rhonchi, or wheezes.     Cardiovascular:   Rhythm is regular. S1 and S2 are normal. No significant murmur is present.    Extremities: Warm and dry, No lower edema   Neurologic: Moves all extremities, non focal    Psych:  Appropriate             Medications:     Current Outpatient Medications   Medication Sig Dispense Refill    acetaminophen (TYLENOL) 500 MG tablet Take 1,000 mg by mouth 2 times daily. Max 4g/24H      acetaminophen (TYLENOL) 500 MG tablet Take 1,000 mg by mouth 2 times daily as needed for mild pain. Max 4g/24H      amiodarone (PACERONE) 200 MG tablet Take 1 tablet (200 mg) by mouth daily. 90 tablet 1    amLODIPine (NORVASC) 10 MG tablet Take 1 tablet (10 mg) by mouth daily. Hold if SBP <100      budesonide (PULMICORT) 1 MG/2ML neb solution Take 2 mLs (1 mg) by nebulization 2 times daily.      doxazosin (CARDURA) 4 MG tablet Take 4 mg by mouth at bedtime. Hold if SBP <100      guaiFENesin (ROBITUSSIN) 20 mg/mL liquid Take 10 mLs (200 mg) by mouth every 4 hours as needed for other (secretion expectorant).      ipratropium - albuterol 0.5 mg/2.5 mg/3 mL (DUONEB) 0.5-2.5 (3) MG/3ML neb solution Take 1 vial by nebulization 3 times daily.      ipratropium - albuterol 0.5 mg/2.5 mg/3 mL (DUONEB) 0.5-2.5 (3) MG/3ML neb solution Take 1 vial by nebulization every 8 hours as needed for shortness of breath or wheezing.      Lidocaine (LIDOCARE) 4 % Patch Place 1 patch onto the skin daily as needed for moderate pain To prevent lidocaine toxicity, patient should be patch free for 12 hrs daily. 30 patch 0    loratadine (CLARITIN) 10 MG tablet Take 10 mg by mouth daily.      magnesium oxide 400 MG CAPS Take 1 capsule by mouth daily      metoprolol succinate ER (TOPROL XL) 100 MG 24 hr tablet Take 1 tablet (100 mg) by mouth daily.      pantoprazole (PROTONIX) 20 MG EC tablet Take 1 tablet (20 mg) by mouth daily. 90 tablet 1    polyethylene glycol (MIRALAX) 17 GM/Dose powder Take 1 Capful by mouth 2  times daily.      senna-docusate (SENOKOT-S/PERICOLACE) 8.6-50 MG tablet Take 2 tablets by mouth 2 times daily. For constipation hold for diarrhea      Vitamin D3 (CHOLECALCIFEROL) 25 mcg (1000 units) tablet Take 1 tablet by mouth daily         Family History   Problem Relation Age of Onset    Diabetes Mother     Hypertension Mother     Pulmonary Embolism Father     Cancer Sister     Arrhythmia Sister     Arrhythmia Son        Social History     Socioeconomic History    Marital status:      Spouse name: Not on file    Number of children: Not on file    Years of education: Not on file    Highest education level: Not on file   Occupational History    Not on file   Tobacco Use    Smoking status: Former     Current packs/day: 0.00     Average packs/day: 2.0 packs/day for 40.0 years (80.0 ttl pk-yrs)     Types: Cigarettes     Start date: 10/22/1951     Quit date: 10/22/1991     Years since quittin.2    Smokeless tobacco: Not on file   Substance and Sexual Activity    Alcohol use: Yes     Comment: 2 glasses wine /week    Drug use: No    Sexual activity: Not on file   Other Topics Concern    Parent/sibling w/ CABG, MI or angioplasty before 65F 55M? Not Asked   Social History Narrative    Not on file     Social Drivers of Health     Financial Resource Strain: Low Risk  (11/15/2024)    Financial Resource Strain     Within the past 12 months, have you or your family members you live with been unable to get utilities (heat, electricity) when it was really needed?: No   Food Insecurity: Low Risk  (11/15/2024)    Food Insecurity     Within the past 12 months, did you worry that your food would run out before you got money to buy more?: No     Within the past 12 months, did the food you bought just not last and you didn t have money to get more?: No   Transportation Needs: Low Risk  (11/15/2024)    Transportation Needs     Within the past 12 months, has lack of transportation kept you from medical appointments,  getting your medicines, non-medical meetings or appointments, work, or from getting things that you need?: No   Physical Activity: Not on File (12/21/2023)    Received from [facility]    Physical Activity     Physical Activity: 0   Stress: Not on File (12/21/2023)    Received from [facility]    Stress     Stress: 0   Social Connections: Not on File (9/15/2024)    Received from [facility]    Social Connections     Connectedness: 0   Interpersonal Safety: High Risk (11/15/2024)    Interpersonal Safety     Do you feel physically and emotionally safe where you currently live?: No     Within the past 12 months, have you been hit, slapped, kicked or otherwise physically hurt by someone?: No     Within the past 12 months, have you been humiliated or emotionally abused in other ways by your partner or ex-partner?: No   Housing Stability: High Risk (11/15/2024)    Housing Stability     Do you have housing? : No     Are you worried about losing your housing?: No            Past Medical History:     Past Medical History:   Diagnosis Date    Arthritis     Cancer (H) 1974    breast, left mastectomy    Chronic anticoagulation     COPD (chronic obstructive pulmonary disease) (H)     CVA (cerebral infarction) 7/3-/2015    Left sided CVA    GERD (gastroesophageal reflux disease)     Hypertension     Osteopenia 6/2011    T score of -2.3 in hip    Paroxysmal atrial fibrillation (H)     Peptic ulcer     Unspecified cerebral artery occlusion with cerebral infarction     Vitamin B deficiency 8/14/2015    Vitamin D deficiency               Past Surgical History:     Past Surgical History:   Procedure Laterality Date    appendectomy      ENDARTERECTOMY CAROTID Left 8/18/2015    Procedure: ENDARTERECTOMY CAROTID;  Surgeon: Arnold Abel MD;  Location: [facility] OR    MASTECTOMY Left 10/20/1974    ORTHOPEDIC SURGERY  7/1/2005    Lumbar Fusion    ORTHOPEDIC SURGERY      Knee Arthroscopy              Allergies:   Losartan and Penicillins      "  Data Reviewed today:    Stress Echo: [facility]  CONCLUSION:   1. No stress echocardiographic findings to confirm myocardial ischemia.   2. Normal left ventricular size and systolic function at rest with an estimated ejection fraction of 55% to 60%.   3. Mild left atrial enlargement.   4. Mild mitral regurgitation.   5. Trivial aortic regurgitation.   6. Trivial pulmonary regurgitation.   7. Trivial tricuspid regurgitation with a calculated pulmonary artery systolic pressure of 30 mmHg.   8. Negative stress electrocardiogram for ischemia.   9. No symptoms of angina.     Stress testin [facility]  CONCLUSION:   1. No stress Cardiolite findings to confirm myocardial ischemia (see   text.)    2. Normal left ventricular size with hyperdynamic systolic function and a   calculated ejection fraction of 88%.   3. Nondiagnostic pharmacologic stress of electrocardiogram for ischemia   due to the resting electrocardiogram changes.    4. No study symptoms.         All laboratory data reviewed:    No lab results found.    Invalid input(s): \"CMP\", \"CBC\"    Lab Results   Component Value Date    WBC 14.1 (H) 2024    WBC 11.5 (H) 2015    RBC 3.98 2024    RBC 4.18 2015    HGB 12.3 2024    HGB 11.7 2015    HCT 36.4 2024    HCT 35.3 2015    MCV 92 2024    MCV 84 2015    MCH 30.9 2024    MCH 28.0 2015    MCHC 33.8 2024    MCHC 33.1 2015    RDW 12.1 2024    RDW 12.9 2015     2024     2015       Lab Results   Component Value Date     2024    POTASSIUM 4.5 2024    POTASSIUM 3.6 2015    CHLORIDE 104 2024    CO2 25 2024    ANIONGAP 8 2024     (H) 2024     (H) 2023    GLC 78 2015    BUN 31.8 (H) 2024    CR 1.22 (H) 2024    CR 1.12 (H) 2015    GFRESTIMATED 41 (L) 2024    GFRESTIMATED 46 (L) 2015    " GFRESTBLACK 56 (L) 08/18/2015    CHANDNI 9.2 11/21/2024      Lab Results   Component Value Date    AST 26 11/17/2024    ALT 13 11/17/2024       Lab Results   Component Value Date    A1C 6.3 (H) 08/18/2015       The longitudinal plan of care for Angely was addressed during this visit. Due to the added complexity in care, I will continue to support Angely in the subsequent management of this condition(s) and with the ongoing continuity of care of this condition(s).    This note has been dictated using voice recognition software. Any grammatical, typographical, or context distortions are unintentional and inherent to the software.

## 2024-12-16 NOTE — NURSING NOTE
[facility]     No vital sign data was received from SNF for today's clinic visit. Spoke with nurse Oma from [facility] ([phone]) and she gave me recent BPs/HRs.    Today: 133/58, 54    Last week:    159/68, 60  133/64, 64  154/68, 62  151/67, 78  148/66, 80    Sent message to Sun MATTHEW with above info.    Nica Briceno RN BSN   4:29 PM 12/16/24  Nurse line M-F 8a-4p: [phone]

## 2024-12-16 NOTE — LETTER
12/16/2024    Rajiv Joe MD  [address] [place] [address]    RE: Angely Bryan       Dear Colleague,     I had the pleasure of seeing Angely Bryan in the [facility].        Cardiology Clinic Follow up     Angely Bryan MRN# [id number]   YOB: 1931 Age: 89 year old     Primary cardiologist: Dr. Brush (hospital consult)    Reason for visit: Post hospital follow up    History of presenting illness:    Angely Bryan is a pleasant 89 year old female with past medical history significant for:    Paroxysmal atrial fibrillation  COPD  History of of intraparenchymal and intraventricular bleed while on OAC for A fib in 12/2023   Carotid stenosis s/p left CEA 2015  TIA 2015  CAC on CT  HTN  HLD  CKD 3  Mild cognitive impairment   Chronic back pain, spinal stenosis   History of falls  DNR/DNI    Recent hospitalization 11/15-11/24/24 with acute hypoxic respiratory failure felt secondary to COPD exacerbation. Cardiology was consulted for AF with RVR, converted to sinus with rate control medications and was started on IV amio. Changed to metoprolol and amiodarone added for rhythm control. No anticoagulation and not felt to be a LAAO candidate with advanced age and frailty.     Today, Angely presents for follow up with her son Maikel. She resides at long term care at [facility]. She has had heart burn at times that has been intermittent. Tums has helped.  Denies any palpitations, chest pain, increased dyspnea. Discussed hospitalization, medications, cardiac testing options.            Assessment and Plan:     ASSESSMENT:    Paroxysmal atrial fibrillation  -Continue rhythm control with amiodarone to help maintain sinus given she is unable to take anticoagulation to reduce stroke risks.   -Reduce amiodarone to 200mg daily; pulse 56 today; encouraged food intake with medications to lessen any GI upset risk  -Continue metoprolol XL 100mg daily   -Monitor heart rates, mildly bradycardic today  "still on amio loading - reducing as above  -Discussed consideration of a baseline Echocardiogram - patient and son favor no cardiac testing, conservative approach to care  -Recommend labs prior to next visit : LFTs, TFT's  -No PFTs given advanced age  -No anticoagulation given bleeding history on previous anticoagulation and not felt to be a LAAO candidate with advanced age and frailty.     HTN  -Mildly elevated today  -Monitor at care facility  -Continues on metoprolol 100mg, amlodipine 10mg and doxazosin 4mg  -Allergy to ARB     3.  GERD  -Trial PPI with pantoprazole 20mg daily       PLAN:     Reduce amiodarone to 200mg daily  Okay to continue pantoprazole for heartburn; follow up with nursing home provider if persisting   CMP and thyroid function labs prior to next visit   Follow up in 3 months          Kerri Flores, WALTER, APRN, CNP  [facility]     Orders this Visit:  Orders Placed This Encounter   Procedures     Comprehensive metabolic panel     TSH with free T4 reflex     Follow-Up with Cardiology VIRGILIO     Orders Placed This Encounter   Medications     amiodarone (PACERONE) 200 MG tablet     Sig: Take 1 tablet (200 mg) by mouth daily.     Dispense:  90 tablet     Refill:  1     pantoprazole (PROTONIX) 20 MG EC tablet     Sig: Take 1 tablet (20 mg) by mouth daily.     Dispense:  90 tablet     Refill:  1     Medications Discontinued During This Encounter   Medication Reason     amiodarone (PACERONE) 200 MG tablet Reorder (No AVS)       Today's clinic visit entailed:  Review of external notes as documented elsewhere in note  Ordering of each unique test  Prescription drug management  The level of medical decision making during this visit was of moderate complexity.             Physical Exam:   Vitals: BP (!) 160/63 (BP Location: Right arm, Patient Position: Sitting)   Pulse 50   Ht 1.6 m (5' 3\")   Wt 63 kg (138 lb 12.8 oz)   SpO2 95%   BMI 24.59 kg/m      Body mass index is 24.59 " kg/m .    Vitals:    12/16/24 1603   Weight: 63 kg (138 lb 12.8 oz)       General :   Alert and oriented older female in wheelchair in no acute distress.    Respiratory:   Respirations unlabored. Clear bilaterally with no rales, rhonchi, or wheezes.     Cardiovascular:   Rhythm is regular. S1 and S2 are normal. No significant murmur is present.    Extremities: Warm and dry, No lower edema   Neurologic: Moves all extremities, non focal    Psych:  Appropriate             Medications:     Current Outpatient Medications   Medication Sig Dispense Refill     acetaminophen (TYLENOL) 500 MG tablet Take 1,000 mg by mouth 2 times daily. Max 4g/24H       acetaminophen (TYLENOL) 500 MG tablet Take 1,000 mg by mouth 2 times daily as needed for mild pain. Max 4g/24H       amiodarone (PACERONE) 200 MG tablet Take 1 tablet (200 mg) by mouth daily. 90 tablet 1     amLODIPine (NORVASC) 10 MG tablet Take 1 tablet (10 mg) by mouth daily. Hold if SBP <100       budesonide (PULMICORT) 1 MG/2ML neb solution Take 2 mLs (1 mg) by nebulization 2 times daily.       doxazosin (CARDURA) 4 MG tablet Take 4 mg by mouth at bedtime. Hold if SBP <100       guaiFENesin (ROBITUSSIN) 20 mg/mL liquid Take 10 mLs (200 mg) by mouth every 4 hours as needed for other (secretion expectorant).       ipratropium - albuterol 0.5 mg/2.5 mg/3 mL (DUONEB) 0.5-2.5 (3) MG/3ML neb solution Take 1 vial by nebulization 3 times daily.       ipratropium - albuterol 0.5 mg/2.5 mg/3 mL (DUONEB) 0.5-2.5 (3) MG/3ML neb solution Take 1 vial by nebulization every 8 hours as needed for shortness of breath or wheezing.       Lidocaine (LIDOCARE) 4 % Patch Place 1 patch onto the skin daily as needed for moderate pain To prevent lidocaine toxicity, patient should be patch free for 12 hrs daily. 30 patch 0     loratadine (CLARITIN) 10 MG tablet Take 10 mg by mouth daily.       magnesium oxide 400 MG CAPS Take 1 capsule by mouth daily       metoprolol succinate ER (TOPROL XL) 100 MG  24 hr tablet Take 1 tablet (100 mg) by mouth daily.       pantoprazole (PROTONIX) 20 MG EC tablet Take 1 tablet (20 mg) by mouth daily. 90 tablet 1     polyethylene glycol (MIRALAX) 17 GM/Dose powder Take 1 Capful by mouth 2 times daily.       senna-docusate (SENOKOT-S/PERICOLACE) 8.6-50 MG tablet Take 2 tablets by mouth 2 times daily. For constipation hold for diarrhea       Vitamin D3 (CHOLECALCIFEROL) 25 mcg (1000 units) tablet Take 1 tablet by mouth daily         Family History   Problem Relation Age of Onset     Diabetes Mother      Hypertension Mother      Pulmonary Embolism Father      Cancer Sister      Arrhythmia Sister      Arrhythmia Son        Social History     Socioeconomic History     Marital status:      Spouse name: Not on file     Number of children: Not on file     Years of education: Not on file     Highest education level: Not on file   Occupational History     Not on file   Tobacco Use     Smoking status: Former     Current packs/day: 0.00     Average packs/day: 2.0 packs/day for 40.0 years (80.0 ttl pk-yrs)     Types: Cigarettes     Start date: 10/22/1951     Quit date: 10/22/1991     Years since quittin.2     Smokeless tobacco: Not on file   Substance and Sexual Activity     Alcohol use: Yes     Comment: 2 glasses wine /week     Drug use: No     Sexual activity: Not on file   Other Topics Concern     Parent/sibling w/ CABG, MI or angioplasty before 65F 55M? Not Asked   Social History Narrative     Not on file     Social Drivers of Health     Financial Resource Strain: Low Risk  (11/15/2024)    Financial Resource Strain      Within the past 12 months, have you or your family members you live with been unable to get utilities (heat, electricity) when it was really needed?: No   Food Insecurity: Low Risk  (11/15/2024)    Food Insecurity      Within the past 12 months, did you worry that your food would run out before you got money to buy more?: No      Within the past 12 months, did  the food you bought just not last and you didn t have money to get more?: No   Transportation Needs: Low Risk  (11/15/2024)    Transportation Needs      Within the past 12 months, has lack of transportation kept you from medical appointments, getting your medicines, non-medical meetings or appointments, work, or from getting things that you need?: No   Physical Activity: Not on File (12/21/2023)    Received from [facility]    Physical Activity      Physical Activity: 0   Stress: Not on File (12/21/2023)    Received from [facility]    Stress      Stress: 0   Social Connections: Not on File (9/15/2024)    Received from [facility]    Social Connections      Connectedness: 0   Interpersonal Safety: High Risk (11/15/2024)    Interpersonal Safety      Do you feel physically and emotionally safe where you currently live?: No      Within the past 12 months, have you been hit, slapped, kicked or otherwise physically hurt by someone?: No      Within the past 12 months, have you been humiliated or emotionally abused in other ways by your partner or ex-partner?: No   Housing Stability: High Risk (11/15/2024)    Housing Stability      Do you have housing? : No      Are you worried about losing your housing?: No            Past Medical History:     Past Medical History:   Diagnosis Date     Arthritis      Cancer (H) 1974    breast, left mastectomy     Chronic anticoagulation      COPD (chronic obstructive pulmonary disease) (H)      CVA (cerebral infarction) 7/3-/2015    Left sided CVA     GERD (gastroesophageal reflux disease)      Hypertension      Osteopenia 6/2011    T score of -2.3 in hip     Paroxysmal atrial fibrillation (H)      Peptic ulcer      Unspecified cerebral artery occlusion with cerebral infarction      Vitamin B deficiency 8/14/2015     Vitamin D deficiency               Past Surgical History:     Past Surgical History:   Procedure Laterality Date     appendectomy       ENDARTERECTOMY CAROTID Left 8/18/2015     "Procedure: ENDARTERECTOMY CAROTID;  Surgeon: Arnold Abel MD;  Location: [facility] OR     MASTECTOMY Left 10/20/1974     ORTHOPEDIC SURGERY  2005    Lumbar Fusion     ORTHOPEDIC SURGERY      Knee Arthroscopy              Allergies:   Losartan and Penicillins       Data Reviewed today:    Stress Echo: [employer]  CONCLUSION:   1. No stress echocardiographic findings to confirm myocardial ischemia.   2. Normal left ventricular size and systolic function at rest with an estimated ejection fraction of 55% to 60%.   3. Mild left atrial enlargement.   4. Mild mitral regurgitation.   5. Trivial aortic regurgitation.   6. Trivial pulmonary regurgitation.   7. Trivial tricuspid regurgitation with a calculated pulmonary artery systolic pressure of 30 mmHg.   8. Negative stress electrocardiogram for ischemia.   9. No symptoms of angina.     Stress testin [employer]  CONCLUSION:   1. No stress Cardiolite findings to confirm myocardial ischemia (see   text.)    2. Normal left ventricular size with hyperdynamic systolic function and a   calculated ejection fraction of 88%.   3. Nondiagnostic pharmacologic stress of electrocardiogram for ischemia   due to the resting electrocardiogram changes.    4. No study symptoms.         All laboratory data reviewed:    No lab results found.    Invalid input(s): \"CMP\", \"CBC\"    Lab Results   Component Value Date    WBC 14.1 (H) 2024    WBC 11.5 (H) 2015    RBC 3.98 2024    RBC 4.18 2015    HGB 12.3 2024    HGB 11.7 2015    HCT 36.4 2024    HCT 35.3 2015    MCV 92 2024    MCV 84 2015    MCH 30.9 2024    MCH 28.0 2015    MCHC 33.8 2024    MCHC 33.1 2015    RDW 12.1 2024    RDW 12.9 2015     2024     2015       Lab Results   Component Value Date     2024    POTASSIUM 4.5 2024    POTASSIUM 3.6 2015    CHLORIDE 104 2024 "    CO2 25 11/21/2024    ANIONGAP 8 11/21/2024     (H) 11/21/2024     (H) 12/26/2023    GLC 78 08/19/2015    BUN 31.8 (H) 11/21/2024    CR 1.22 (H) 11/21/2024    CR 1.12 (H) 08/18/2015    GFRESTIMATED 41 (L) 11/21/2024    GFRESTIMATED 46 (L) 08/18/2015    GFRESTBLACK 56 (L) 08/18/2015    CHANDNI 9.2 11/21/2024      Lab Results   Component Value Date    AST 26 11/17/2024    ALT 13 11/17/2024       Lab Results   Component Value Date    A1C 6.3 (H) 08/18/2015       The longitudinal plan of care for Angely was addressed during this visit. Due to the added complexity in care, I will continue to support Angely in the subsequent management of this condition(s) and with the ongoing continuity of care of this condition(s).    This note has been dictated using voice recognition software. Any grammatical, typographical, or context distortions are unintentional and inherent to the software.    Thank you for allowing me to participate in the care of your patient.      Sincerely,     HEIDY Kerr [facility]  cc:   Rajiv Joe MD  [id number] [place] [id number]